# Patient Record
Sex: MALE | Race: ASIAN | NOT HISPANIC OR LATINO
[De-identification: names, ages, dates, MRNs, and addresses within clinical notes are randomized per-mention and may not be internally consistent; named-entity substitution may affect disease eponyms.]

---

## 2018-08-22 PROBLEM — Z00.00 ENCOUNTER FOR PREVENTIVE HEALTH EXAMINATION: Status: ACTIVE | Noted: 2018-08-22

## 2018-09-11 ENCOUNTER — APPOINTMENT (OUTPATIENT)
Dept: SURGERY | Facility: CLINIC | Age: 68
End: 2018-09-11
Payer: COMMERCIAL

## 2018-09-11 VITALS
DIASTOLIC BLOOD PRESSURE: 74 MMHG | WEIGHT: 126 LBS | SYSTOLIC BLOOD PRESSURE: 111 MMHG | HEIGHT: 66 IN | OXYGEN SATURATION: 98 % | HEART RATE: 91 BPM | BODY MASS INDEX: 20.25 KG/M2

## 2018-09-11 PROCEDURE — 99214 OFFICE O/P EST MOD 30 MIN: CPT

## 2018-09-13 ENCOUNTER — INPATIENT (INPATIENT)
Facility: HOSPITAL | Age: 68
LOS: 4 days | Discharge: HOME | DRG: 391 | End: 2018-09-18
Attending: SURGERY | Admitting: SURGERY
Payer: COMMERCIAL

## 2018-09-13 VITALS
HEIGHT: 67 IN | RESPIRATION RATE: 18 BRPM | WEIGHT: 119.93 LBS | TEMPERATURE: 98 F | HEART RATE: 114 BPM | OXYGEN SATURATION: 98 % | SYSTOLIC BLOOD PRESSURE: 101 MMHG | DIASTOLIC BLOOD PRESSURE: 70 MMHG

## 2018-09-13 DIAGNOSIS — K92.0 HEMATEMESIS: ICD-10-CM

## 2018-09-13 LAB
ALBUMIN SERPL ELPH-MCNC: 3.1 G/DL — LOW (ref 3.3–5)
ALP SERPL-CCNC: 107 U/L — SIGNIFICANT CHANGE UP (ref 40–120)
ALT FLD-CCNC: 51 U/L — HIGH (ref 10–45)
ANION GAP SERPL CALC-SCNC: 11 MMOL/L — SIGNIFICANT CHANGE UP (ref 5–17)
APTT BLD: 50.3 SEC — HIGH (ref 27.5–37.4)
AST SERPL-CCNC: 33 U/L — SIGNIFICANT CHANGE UP (ref 10–40)
BASOPHILS # BLD AUTO: 0 K/UL — SIGNIFICANT CHANGE UP (ref 0–0.2)
BASOPHILS NFR BLD AUTO: 0 % — SIGNIFICANT CHANGE UP (ref 0–2)
BASOPHILS NFR BLD AUTO: 0.1 % — SIGNIFICANT CHANGE UP (ref 0–2)
BASOPHILS NFR BLD AUTO: 0.2 % — SIGNIFICANT CHANGE UP (ref 0–2)
BILIRUB SERPL-MCNC: 0.8 MG/DL — SIGNIFICANT CHANGE UP (ref 0.2–1.2)
BUN SERPL-MCNC: 19 MG/DL — SIGNIFICANT CHANGE UP (ref 7–23)
CALCIUM SERPL-MCNC: 8.6 MG/DL — SIGNIFICANT CHANGE UP (ref 8.4–10.5)
CHLORIDE SERPL-SCNC: 98 MMOL/L — SIGNIFICANT CHANGE UP (ref 96–108)
CO2 SERPL-SCNC: 25 MMOL/L — SIGNIFICANT CHANGE UP (ref 22–31)
CREAT SERPL-MCNC: 0.7 MG/DL — SIGNIFICANT CHANGE UP (ref 0.5–1.3)
EOSINOPHIL # BLD AUTO: 0 K/UL — SIGNIFICANT CHANGE UP (ref 0–0.5)
EOSINOPHIL # BLD AUTO: 0.1 K/UL — SIGNIFICANT CHANGE UP (ref 0–0.5)
EOSINOPHIL # BLD AUTO: 0.1 K/UL — SIGNIFICANT CHANGE UP (ref 0–0.5)
EOSINOPHIL NFR BLD AUTO: 0.2 % — SIGNIFICANT CHANGE UP (ref 0–6)
EOSINOPHIL NFR BLD AUTO: 0.4 % — SIGNIFICANT CHANGE UP (ref 0–6)
EOSINOPHIL NFR BLD AUTO: 0.5 % — SIGNIFICANT CHANGE UP (ref 0–6)
GAS PNL BLDV: SIGNIFICANT CHANGE UP
GLUCOSE SERPL-MCNC: 142 MG/DL — HIGH (ref 70–99)
HCT VFR BLD CALC: 29.6 % — LOW (ref 39–50)
HCT VFR BLD CALC: 30.7 % — LOW (ref 39–50)
HCT VFR BLD CALC: 38.2 % — LOW (ref 39–50)
HGB BLD-MCNC: 11.9 G/DL — LOW (ref 13–17)
HGB BLD-MCNC: 9.2 G/DL — LOW (ref 13–17)
HGB BLD-MCNC: 9.8 G/DL — LOW (ref 13–17)
INR BLD: 1.54 RATIO — HIGH (ref 0.88–1.16)
INR BLD: 6.01 RATIO — CRITICAL HIGH (ref 0.88–1.16)
INR BLD: 6.79 RATIO — CRITICAL HIGH (ref 0.88–1.16)
LIDOCAIN IGE QN: >530 U/L — HIGH (ref 7–60)
LYMPHOCYTES # BLD AUTO: 1.8 K/UL — SIGNIFICANT CHANGE UP (ref 1–3.3)
LYMPHOCYTES # BLD AUTO: 12.9 % — LOW (ref 13–44)
LYMPHOCYTES # BLD AUTO: 15.3 % — SIGNIFICANT CHANGE UP (ref 13–44)
LYMPHOCYTES # BLD AUTO: 2.2 K/UL — SIGNIFICANT CHANGE UP (ref 1–3.3)
LYMPHOCYTES # BLD AUTO: 2.5 K/UL — SIGNIFICANT CHANGE UP (ref 1–3.3)
LYMPHOCYTES # BLD AUTO: 9.3 % — LOW (ref 13–44)
MCHC RBC-ENTMCNC: 27.9 PG — SIGNIFICANT CHANGE UP (ref 27–34)
MCHC RBC-ENTMCNC: 28.1 PG — SIGNIFICANT CHANGE UP (ref 27–34)
MCHC RBC-ENTMCNC: 28.7 PG — SIGNIFICANT CHANGE UP (ref 27–34)
MCHC RBC-ENTMCNC: 30.9 GM/DL — LOW (ref 32–36)
MCHC RBC-ENTMCNC: 31.2 GM/DL — LOW (ref 32–36)
MCHC RBC-ENTMCNC: 32 GM/DL — SIGNIFICANT CHANGE UP (ref 32–36)
MCV RBC AUTO: 89.7 FL — SIGNIFICANT CHANGE UP (ref 80–100)
MCV RBC AUTO: 90 FL — SIGNIFICANT CHANGE UP (ref 80–100)
MCV RBC AUTO: 90.1 FL — SIGNIFICANT CHANGE UP (ref 80–100)
MONOCYTES # BLD AUTO: 1.3 K/UL — HIGH (ref 0–0.9)
MONOCYTES # BLD AUTO: 1.5 K/UL — HIGH (ref 0–0.9)
MONOCYTES # BLD AUTO: 1.7 K/UL — HIGH (ref 0–0.9)
MONOCYTES NFR BLD AUTO: 7.4 % — SIGNIFICANT CHANGE UP (ref 2–14)
MONOCYTES NFR BLD AUTO: 8.9 % — SIGNIFICANT CHANGE UP (ref 2–14)
MONOCYTES NFR BLD AUTO: 8.9 % — SIGNIFICANT CHANGE UP (ref 2–14)
NEUTROPHILS # BLD AUTO: 12.4 K/UL — HIGH (ref 1.8–7.4)
NEUTROPHILS # BLD AUTO: 13.6 K/UL — HIGH (ref 1.8–7.4)
NEUTROPHILS # BLD AUTO: 15.3 K/UL — HIGH (ref 1.8–7.4)
NEUTROPHILS NFR BLD AUTO: 75 % — SIGNIFICANT CHANGE UP (ref 43–77)
NEUTROPHILS NFR BLD AUTO: 79.4 % — HIGH (ref 43–77)
NEUTROPHILS NFR BLD AUTO: 81.3 % — HIGH (ref 43–77)
OB PNL STL: NEGATIVE — SIGNIFICANT CHANGE UP
PLATELET # BLD AUTO: 476 K/UL — HIGH (ref 150–400)
PLATELET # BLD AUTO: 499 K/UL — HIGH (ref 150–400)
PLATELET # BLD AUTO: 552 K/UL — HIGH (ref 150–400)
POTASSIUM SERPL-MCNC: 4.2 MMOL/L — SIGNIFICANT CHANGE UP (ref 3.5–5.3)
POTASSIUM SERPL-SCNC: 4.2 MMOL/L — SIGNIFICANT CHANGE UP (ref 3.5–5.3)
PROT SERPL-MCNC: 6.5 G/DL — SIGNIFICANT CHANGE UP (ref 6–8.3)
PROTHROM AB SERPL-ACNC: 16.8 SEC — HIGH (ref 9.8–12.7)
PROTHROM AB SERPL-ACNC: 67.3 SEC — HIGH (ref 9.8–12.7)
PROTHROM AB SERPL-ACNC: 76.3 SEC — HIGH (ref 9.8–12.7)
RBC # BLD: 3.29 M/UL — LOW (ref 4.2–5.8)
RBC # BLD: 3.42 M/UL — LOW (ref 4.2–5.8)
RBC # BLD: 4.25 M/UL — SIGNIFICANT CHANGE UP (ref 4.2–5.8)
RBC # FLD: 15.8 % — HIGH (ref 10.3–14.5)
RBC # FLD: 15.9 % — HIGH (ref 10.3–14.5)
RBC # FLD: 16.2 % — HIGH (ref 10.3–14.5)
SODIUM SERPL-SCNC: 134 MMOL/L — LOW (ref 135–145)
WBC # BLD: 16.6 K/UL — HIGH (ref 3.8–10.5)
WBC # BLD: 17.2 K/UL — HIGH (ref 3.8–10.5)
WBC # BLD: 18.8 K/UL — HIGH (ref 3.8–10.5)
WBC # FLD AUTO: 16.6 K/UL — HIGH (ref 3.8–10.5)
WBC # FLD AUTO: 17.2 K/UL — HIGH (ref 3.8–10.5)
WBC # FLD AUTO: 18.8 K/UL — HIGH (ref 3.8–10.5)

## 2018-09-13 PROCEDURE — 99285 EMERGENCY DEPT VISIT HI MDM: CPT

## 2018-09-13 RX ORDER — PHYTONADIONE (VIT K1) 5 MG
10 TABLET ORAL ONCE
Qty: 0 | Refills: 0 | Status: COMPLETED | OUTPATIENT
Start: 2018-09-13 | End: 2018-09-13

## 2018-09-13 RX ORDER — ONDANSETRON 8 MG/1
4 TABLET, FILM COATED ORAL ONCE
Qty: 0 | Refills: 0 | Status: COMPLETED | OUTPATIENT
Start: 2018-09-13 | End: 2018-09-13

## 2018-09-13 RX ORDER — SODIUM CHLORIDE 9 MG/ML
2000 INJECTION INTRAMUSCULAR; INTRAVENOUS; SUBCUTANEOUS ONCE
Qty: 0 | Refills: 0 | Status: COMPLETED | OUTPATIENT
Start: 2018-09-13 | End: 2018-09-13

## 2018-09-13 RX ORDER — PANTOPRAZOLE SODIUM 20 MG/1
80 TABLET, DELAYED RELEASE ORAL ONCE
Qty: 0 | Refills: 0 | Status: COMPLETED | OUTPATIENT
Start: 2018-09-13 | End: 2018-09-13

## 2018-09-13 RX ORDER — PROTHROMBIN COMPLEX CONCENTRATE (HUMAN) 25.5; 16.5; 24; 22; 22; 26 [IU]/ML; [IU]/ML; [IU]/ML; [IU]/ML; [IU]/ML; [IU]/ML
1500 POWDER, FOR SOLUTION INTRAVENOUS ONCE
Qty: 0 | Refills: 0 | Status: COMPLETED | OUTPATIENT
Start: 2018-09-13 | End: 2018-09-13

## 2018-09-13 RX ORDER — ACETAMINOPHEN 500 MG
1000 TABLET ORAL ONCE
Qty: 0 | Refills: 0 | Status: COMPLETED | OUTPATIENT
Start: 2018-09-13 | End: 2018-09-13

## 2018-09-13 RX ADMIN — Medication 1000 MILLIGRAM(S): at 16:33

## 2018-09-13 RX ADMIN — PANTOPRAZOLE SODIUM 80 MILLIGRAM(S): 20 TABLET, DELAYED RELEASE ORAL at 16:49

## 2018-09-13 RX ADMIN — SODIUM CHLORIDE 1000 MILLILITER(S): 9 INJECTION INTRAMUSCULAR; INTRAVENOUS; SUBCUTANEOUS at 14:40

## 2018-09-13 RX ADMIN — Medication 400 MILLIGRAM(S): at 14:39

## 2018-09-13 RX ADMIN — ONDANSETRON 4 MILLIGRAM(S): 8 TABLET, FILM COATED ORAL at 18:36

## 2018-09-13 RX ADMIN — Medication 102 MILLIGRAM(S): at 17:13

## 2018-09-13 RX ADMIN — PROTHROMBIN COMPLEX CONCENTRATE (HUMAN) 400 INTERNATIONAL UNIT(S): 25.5; 16.5; 24; 22; 22; 26 POWDER, FOR SOLUTION INTRAVENOUS at 16:57

## 2018-09-13 RX ADMIN — Medication 10 MILLIGRAM(S): at 20:59

## 2018-09-13 NOTE — ED PROVIDER NOTE - PROGRESS NOTE DETAILS
Graham POWER: Patient signed out by Dr. Collier pending CT A/P and admission. Patient with hemorrhagic pancreatitis, here for coffee ground emesis. Also on coumadin, INR 6, receiving Kcentra, vitamin K and Protonix. Hgb 11.9. No history of melena. Surgery to see patient. Patient to be admitted. Pending CT A/P. Called by surgery, requesting repeat CBC to determine floor vs. SICU. - Madeline Corley PA-C Called by surgery resident, requesting repeat CBC to determine floor vs. SICU. No longer need CT scan. - Madeline Corley PA-C Graham POWER: Discussed case with surgery, repeat Hgb 9.2, /60. No more episodes of vomiting while in ED. Surgery recommendation is to admit to floor. Admit to Dr. Hinojosa. Note: CT cancelled per surgery.

## 2018-09-13 NOTE — ED ADULT NURSE REASSESSMENT NOTE - NS ED NURSE REASSESS COMMENT FT1
Care assumed of pt at this time. Pt updated to plan of care. See VS. Pt denies needs for assistance. Son at bedside.

## 2018-09-13 NOTE — ED ADULT NURSE REASSESSMENT NOTE - NS ED NURSE REASSESS COMMENT FT1
Plan of care explained to son at bedside. comfort and safety measures in place. pt. pending CT scan.

## 2018-09-13 NOTE — ED PROVIDER NOTE - ATTENDING CONTRIBUTION TO CARE
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  Reporting ongoing epigastric pains.  Normal stools.  No prior history of GIB per son.    On exam patient chronically ill appearing, mildly hypotensive but otherwise vital signs within normal limits, oropharynx clear, regular rate and rhythm S1/S2, lungs clear to ascultation bilaterally, abdomen soft but very tender in epigastrum.    Patient on coumadin presenting with upper GIB, also concerned for possibility of complication of pancreatitis (necrosis/erosion/fistula into stomach) - labs with high white count, elevate INR - given Kcentra, Vit K, protonix, will obtain CT A/P to evaluate for complication of pancreatitis, surgical consultation, serial H&H, admission for further care.

## 2018-09-13 NOTE — ED ADULT NURSE NOTE - OBJECTIVE STATEMENT
66 yo M presents to ED A+OX3 by EMS accompanied by his son from nursing home. As per EMS, patient had episode of bloody emesis today. Son at bedside, states patient has had multiple hospital visits after having necrotizing pancreatitis approx. 2 months ago and has been on coumadin after having splenic vein thrombosis. As per son, patient "always had abdominal pain but it is intermittently worse." Breathing unlabored on RA. Skin warm pink and dry. Pt. noted to have PICC line to right upper arm, dressing is clean dry and intact, line was not accessed while in ED. Abdomen soft, nondistended, nontender. Son at bedside. Comfort and safety measures in place.

## 2018-09-13 NOTE — ED PROVIDER NOTE - OBJECTIVE STATEMENT
66yo M with PMH splenic vein thrombosis (on coumadin), necrotizing pancreatitis (2 months ago) with recurrent hospitalizations ([facility]) sent from rehab facility for hematemeses x 1 today.  Patient is Cantonese speaking with son translating at bedside per patient request.  Patient reports decreased PO intake and return of diffuse abdominal pain x 3-4 days. Saw GI on 9/11 and told pain likely due to pancreatic enzyme trouble. 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 Denies any alcohol/drug use. Denies fever/chills, CP, SOB, palpitations, melena, BRBPR, hematuria.   GI Malik Hinojosa 66yo M with PMH splenic vein thrombosis (on coumadin), necrotizing pancreatitis (2 months ago) with recurrent hospitalizations ([facility]) sent from rehab facility for hematemeses x 1 today.  Patient is Cantonese speaking with son translating at bedside per patient request.  Patient reports decreased PO intake and return of diffuse abdominal pain x 3-4 days. Saw GI on 9/11 and told pain likely due to pancreatic enzyme trouble. 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 Denies any alcohol/drug use. Denies fever/chills, CP, SOB, palpitations, melena, BRBPR, hematuria, back pain.  GI Malik Hinojosa

## 2018-09-13 NOTE — ED ADULT NURSE NOTE - INTERVENTIONS DEFINITIONS
Call bell, personal items and telephone within reach/Stretcher in lowest position, wheels locked, appropriate side rails in place/[place] to call system/Instruct patient to call for assistance/Provide visual cue, wrist band, yellow gown, etc.

## 2018-09-14 LAB
ALBUMIN SERPL ELPH-MCNC: 2.3 G/DL — LOW (ref 3.3–5)
ALBUMIN SERPL ELPH-MCNC: 2.4 G/DL — LOW (ref 3.3–5)
ALP SERPL-CCNC: 80 U/L — SIGNIFICANT CHANGE UP (ref 40–120)
ALP SERPL-CCNC: 88 U/L — SIGNIFICANT CHANGE UP (ref 40–120)
ALT FLD-CCNC: 26 U/L — SIGNIFICANT CHANGE UP (ref 10–45)
ALT FLD-CCNC: 28 U/L — SIGNIFICANT CHANGE UP (ref 10–45)
ANION GAP SERPL CALC-SCNC: 12 MMOL/L — SIGNIFICANT CHANGE UP (ref 5–17)
ANION GAP SERPL CALC-SCNC: 7 MMOL/L — SIGNIFICANT CHANGE UP (ref 5–17)
ANION GAP SERPL CALC-SCNC: 9 MMOL/L — SIGNIFICANT CHANGE UP (ref 5–17)
APPEARANCE UR: CLEAR — SIGNIFICANT CHANGE UP
APTT BLD: 32.6 SEC — SIGNIFICANT CHANGE UP (ref 27.5–37.4)
AST SERPL-CCNC: 12 U/L — SIGNIFICANT CHANGE UP (ref 10–40)
AST SERPL-CCNC: 15 U/L — SIGNIFICANT CHANGE UP (ref 10–40)
BACTERIA # UR AUTO: NEGATIVE — SIGNIFICANT CHANGE UP
BILIRUB SERPL-MCNC: 0.4 MG/DL — SIGNIFICANT CHANGE UP (ref 0.2–1.2)
BILIRUB SERPL-MCNC: 0.7 MG/DL — SIGNIFICANT CHANGE UP (ref 0.2–1.2)
BILIRUB UR-MCNC: NEGATIVE — SIGNIFICANT CHANGE UP
BUN SERPL-MCNC: 11 MG/DL — SIGNIFICANT CHANGE UP (ref 7–23)
BUN SERPL-MCNC: 13 MG/DL — SIGNIFICANT CHANGE UP (ref 7–23)
BUN SERPL-MCNC: 15 MG/DL — SIGNIFICANT CHANGE UP (ref 7–23)
CALCIUM SERPL-MCNC: 8 MG/DL — LOW (ref 8.4–10.5)
CALCIUM SERPL-MCNC: 8.1 MG/DL — LOW (ref 8.4–10.5)
CALCIUM SERPL-MCNC: 8.2 MG/DL — LOW (ref 8.4–10.5)
CHLORIDE SERPL-SCNC: 102 MMOL/L — SIGNIFICANT CHANGE UP (ref 96–108)
CHLORIDE SERPL-SCNC: 102 MMOL/L — SIGNIFICANT CHANGE UP (ref 96–108)
CHLORIDE SERPL-SCNC: 103 MMOL/L — SIGNIFICANT CHANGE UP (ref 96–108)
CO2 SERPL-SCNC: 23 MMOL/L — SIGNIFICANT CHANGE UP (ref 22–31)
CO2 SERPL-SCNC: 23 MMOL/L — SIGNIFICANT CHANGE UP (ref 22–31)
CO2 SERPL-SCNC: 24 MMOL/L — SIGNIFICANT CHANGE UP (ref 22–31)
COLOR SPEC: ABNORMAL
CREAT SERPL-MCNC: 0.67 MG/DL — SIGNIFICANT CHANGE UP (ref 0.5–1.3)
CREAT SERPL-MCNC: 0.69 MG/DL — SIGNIFICANT CHANGE UP (ref 0.5–1.3)
CREAT SERPL-MCNC: 0.71 MG/DL — SIGNIFICANT CHANGE UP (ref 0.5–1.3)
DIFF PNL FLD: NEGATIVE — SIGNIFICANT CHANGE UP
EPI CELLS # UR: 3 /HPF — SIGNIFICANT CHANGE UP
GLUCOSE SERPL-MCNC: 110 MG/DL — HIGH (ref 70–99)
GLUCOSE SERPL-MCNC: 129 MG/DL — HIGH (ref 70–99)
GLUCOSE SERPL-MCNC: 135 MG/DL — HIGH (ref 70–99)
GLUCOSE UR QL: NEGATIVE — SIGNIFICANT CHANGE UP
HCT VFR BLD CALC: 25.6 % — LOW (ref 39–50)
HCT VFR BLD CALC: 26 % — LOW (ref 39–50)
HCT VFR BLD CALC: 26.4 % — LOW (ref 39–50)
HGB BLD-MCNC: 8.4 G/DL — LOW (ref 13–17)
HGB BLD-MCNC: 8.4 G/DL — LOW (ref 13–17)
HGB BLD-MCNC: 8.7 G/DL — LOW (ref 13–17)
HYALINE CASTS # UR AUTO: 1 /LPF — SIGNIFICANT CHANGE UP (ref 0–2)
INR BLD: 1.37 RATIO — HIGH (ref 0.88–1.16)
INR BLD: 1.55 RATIO — HIGH (ref 0.88–1.16)
KETONES UR-MCNC: NEGATIVE — SIGNIFICANT CHANGE UP
LACTATE SERPL-SCNC: 0.9 MMOL/L — SIGNIFICANT CHANGE UP (ref 0.7–2)
LACTATE SERPL-SCNC: 1 MMOL/L — SIGNIFICANT CHANGE UP (ref 0.7–2)
LEUKOCYTE ESTERASE UR-ACNC: NEGATIVE — SIGNIFICANT CHANGE UP
LIDOCAIN IGE QN: >530 U/L — HIGH (ref 7–60)
MAGNESIUM SERPL-MCNC: 1.9 MG/DL — SIGNIFICANT CHANGE UP (ref 1.6–2.6)
MAGNESIUM SERPL-MCNC: 2 MG/DL — SIGNIFICANT CHANGE UP (ref 1.6–2.6)
MAGNESIUM SERPL-MCNC: 2.1 MG/DL — SIGNIFICANT CHANGE UP (ref 1.6–2.6)
MCHC RBC-ENTMCNC: 29.1 PG — SIGNIFICANT CHANGE UP (ref 27–34)
MCHC RBC-ENTMCNC: 29.5 PG — SIGNIFICANT CHANGE UP (ref 27–34)
MCHC RBC-ENTMCNC: 29.6 PG — SIGNIFICANT CHANGE UP (ref 27–34)
MCHC RBC-ENTMCNC: 32.5 GM/DL — SIGNIFICANT CHANGE UP (ref 32–36)
MCHC RBC-ENTMCNC: 32.8 GM/DL — SIGNIFICANT CHANGE UP (ref 32–36)
MCHC RBC-ENTMCNC: 32.8 GM/DL — SIGNIFICANT CHANGE UP (ref 32–36)
MCV RBC AUTO: 89.5 FL — SIGNIFICANT CHANGE UP (ref 80–100)
MCV RBC AUTO: 90 FL — SIGNIFICANT CHANGE UP (ref 80–100)
MCV RBC AUTO: 90.4 FL — SIGNIFICANT CHANGE UP (ref 80–100)
NITRITE UR-MCNC: NEGATIVE — SIGNIFICANT CHANGE UP
PH UR: 7 — SIGNIFICANT CHANGE UP (ref 5–8)
PHOSPHATE SERPL-MCNC: 1.9 MG/DL — LOW (ref 2.5–4.5)
PHOSPHATE SERPL-MCNC: 2.2 MG/DL — LOW (ref 2.5–4.5)
PHOSPHATE SERPL-MCNC: 3.3 MG/DL — SIGNIFICANT CHANGE UP (ref 2.5–4.5)
PLATELET # BLD AUTO: 415 K/UL — HIGH (ref 150–400)
PLATELET # BLD AUTO: 421 K/UL — HIGH (ref 150–400)
PLATELET # BLD AUTO: 433 K/UL — HIGH (ref 150–400)
POTASSIUM SERPL-MCNC: 4 MMOL/L — SIGNIFICANT CHANGE UP (ref 3.5–5.3)
POTASSIUM SERPL-MCNC: 4.1 MMOL/L — SIGNIFICANT CHANGE UP (ref 3.5–5.3)
POTASSIUM SERPL-MCNC: 4.2 MMOL/L — SIGNIFICANT CHANGE UP (ref 3.5–5.3)
POTASSIUM SERPL-SCNC: 4 MMOL/L — SIGNIFICANT CHANGE UP (ref 3.5–5.3)
POTASSIUM SERPL-SCNC: 4.1 MMOL/L — SIGNIFICANT CHANGE UP (ref 3.5–5.3)
POTASSIUM SERPL-SCNC: 4.2 MMOL/L — SIGNIFICANT CHANGE UP (ref 3.5–5.3)
PROT SERPL-MCNC: 5.3 G/DL — LOW (ref 6–8.3)
PROT SERPL-MCNC: 5.3 G/DL — LOW (ref 6–8.3)
PROT UR-MCNC: ABNORMAL
PROTHROM AB SERPL-ACNC: 15 SEC — HIGH (ref 9.8–12.7)
PROTHROM AB SERPL-ACNC: 16.9 SEC — HIGH (ref 9.8–12.7)
RBC # BLD: 2.84 M/UL — LOW (ref 4.2–5.8)
RBC # BLD: 2.91 M/UL — LOW (ref 4.2–5.8)
RBC # BLD: 2.93 M/UL — LOW (ref 4.2–5.8)
RBC # FLD: 15.7 % — HIGH (ref 10.3–14.5)
RBC # FLD: 15.7 % — HIGH (ref 10.3–14.5)
RBC # FLD: 15.8 % — HIGH (ref 10.3–14.5)
RBC CASTS # UR COMP ASSIST: 4 /HPF — SIGNIFICANT CHANGE UP (ref 0–4)
SODIUM SERPL-SCNC: 133 MMOL/L — LOW (ref 135–145)
SODIUM SERPL-SCNC: 135 MMOL/L — SIGNIFICANT CHANGE UP (ref 135–145)
SODIUM SERPL-SCNC: 137 MMOL/L — SIGNIFICANT CHANGE UP (ref 135–145)
SP GR SPEC: 1.02 — SIGNIFICANT CHANGE UP (ref 1.01–1.02)
TRIGL SERPL-MCNC: 138 MG/DL — SIGNIFICANT CHANGE UP (ref 10–149)
UROBILINOGEN FLD QL: NEGATIVE — SIGNIFICANT CHANGE UP
WBC # BLD: 12 K/UL — HIGH (ref 3.8–10.5)
WBC # BLD: 13 K/UL — HIGH (ref 3.8–10.5)
WBC # BLD: 13.1 K/UL — HIGH (ref 3.8–10.5)
WBC # FLD AUTO: 12 K/UL — HIGH (ref 3.8–10.5)
WBC # FLD AUTO: 13 K/UL — HIGH (ref 3.8–10.5)
WBC # FLD AUTO: 13.1 K/UL — HIGH (ref 3.8–10.5)
WBC UR QL: 3 /HPF — SIGNIFICANT CHANGE UP (ref 0–5)

## 2018-09-14 PROCEDURE — 99223 1ST HOSP IP/OBS HIGH 75: CPT | Mod: GC

## 2018-09-14 RX ORDER — ENOXAPARIN SODIUM 100 MG/ML
40 INJECTION SUBCUTANEOUS DAILY
Qty: 0 | Refills: 0 | Status: DISCONTINUED | OUTPATIENT
Start: 2018-09-14 | End: 2018-09-15

## 2018-09-14 RX ORDER — PANTOPRAZOLE SODIUM 20 MG/1
40 TABLET, DELAYED RELEASE ORAL
Qty: 0 | Refills: 0 | Status: DISCONTINUED | OUTPATIENT
Start: 2018-09-14 | End: 2018-09-18

## 2018-09-14 RX ORDER — ACETAMINOPHEN 500 MG
1000 TABLET ORAL ONCE
Qty: 0 | Refills: 0 | Status: COMPLETED | OUTPATIENT
Start: 2018-09-14 | End: 2018-09-14

## 2018-09-14 RX ORDER — DOCUSATE SODIUM 100 MG
100 CAPSULE ORAL
Qty: 0 | Refills: 0 | Status: DISCONTINUED | OUTPATIENT
Start: 2018-09-14 | End: 2018-09-18

## 2018-09-14 RX ORDER — PANTOPRAZOLE SODIUM 20 MG/1
40 TABLET, DELAYED RELEASE ORAL
Qty: 0 | Refills: 0 | Status: DISCONTINUED | OUTPATIENT
Start: 2018-09-14 | End: 2018-09-14

## 2018-09-14 RX ORDER — SODIUM CHLORIDE 9 MG/ML
1000 INJECTION, SOLUTION INTRAVENOUS
Qty: 0 | Refills: 0 | Status: DISCONTINUED | OUTPATIENT
Start: 2018-09-14 | End: 2018-09-14

## 2018-09-14 RX ADMIN — Medication 100 MILLIGRAM(S): at 05:35

## 2018-09-14 RX ADMIN — ENOXAPARIN SODIUM 40 MILLIGRAM(S): 100 INJECTION SUBCUTANEOUS at 12:01

## 2018-09-14 RX ADMIN — Medication 85 MILLIMOLE(S): at 20:21

## 2018-09-14 RX ADMIN — Medication 62.5 MILLIMOLE(S): at 15:51

## 2018-09-14 RX ADMIN — SODIUM CHLORIDE 75 MILLILITER(S): 9 INJECTION, SOLUTION INTRAVENOUS at 01:36

## 2018-09-14 RX ADMIN — PANTOPRAZOLE SODIUM 40 MILLIGRAM(S): 20 TABLET, DELAYED RELEASE ORAL at 05:35

## 2018-09-14 RX ADMIN — Medication 1000 MILLIGRAM(S): at 01:51

## 2018-09-14 RX ADMIN — PANTOPRAZOLE SODIUM 40 MILLIGRAM(S): 20 TABLET, DELAYED RELEASE ORAL at 17:58

## 2018-09-14 RX ADMIN — Medication 100 MILLIGRAM(S): at 17:58

## 2018-09-14 RX ADMIN — Medication 62.5 MILLIMOLE(S): at 10:25

## 2018-09-14 RX ADMIN — Medication 400 MILLIGRAM(S): at 01:36

## 2018-09-14 NOTE — H&P ADULT - NSHPPHYSICALEXAM_GEN_ALL_CORE
Gen: AAOx3, NAD, mentating normally  Neuro: Cranial nerves II-XII grossly intact  HEENT: Atraumatic, normocephalic  CV: RRR, normal S1/S2, no audible m/r/g  Pulm: Breathing comfortably on RA. Equal chest rise b/l. Lung fields CTAB  Abd: Soft, epigastric tenderness, moderately distended. No rebound or guarding.  Back/flank: No CVA tenderness  Extremities: WWP. Moving all 4 extremities spontaneously. Strength 5/5. Sensation intact  Skin: No rashes or suspicious lesions

## 2018-09-14 NOTE — H&P ADULT - NSHPLABSRESULTS_GEN_ALL_CORE
CBC (09-13 @ 20:33)                              9.2<L>                         16.6<H>  )----------------(  476<H>     75.0  % Neutrophils, 15.3  % Lymphocytes, ANC: 12.4<H>                              29.6<L>  CBC (09-13 @ 17:14)                              9.8<L>                         17.2<H>  )----------------(  499<H>     79.4<H>% Neutrophils, 12.9<L>% Lymphocytes, ANC: 13.6<H>                              30.7<L>    BMP (09-13 @ 14:48)             134<L>  |  98      |  19    		Ca++ --      Ca 8.6                ---------------------------------( 142<H>		Mg --                 4.2     |  25      |  0.70  			Ph --        LFTs (09-13 @ 14:48)      TPro 6.5 / Alb 3.1<L> / TBili 0.8 / DBili -- / AST 33 / ALT 51<H> / AlkPhos 107    Coags (09-13 @ 18:01)  aPTT -- / INR 1.54<H> / PT 16.8<H>  Coags (09-13 @ 17:14)  aPTT -- / INR 6.79<HH> / PT 76.3<H>        VBG (09-13 @ 14:48)     7.41 / 42 / 56<H> / 26 / 1.7 / 88%     Lactate: 2.9<H>

## 2018-09-14 NOTE — H&P ADULT - ASSESSMENT
Assessment:  67M pmhx necrotizing pancreatitis, with demonstration of yandy-pancreatic fluid collections, now presenting with 1 episode of coffee-ground emesis and abdominal pain.    Plan:  - Admit [facility] under Dr. Hinojosa  - Regular diet  - Pain control as necessary  - No need for further CT scans  - Will monitor H&H  - OOB and ambulating as tolerated    Discussed with Dr. Ashish Pop, PGY-2  [facility] [phone]

## 2018-09-14 NOTE — H&P ADULT - HISTORY OF PRESENT ILLNESS
67 year-old Cantonese speaking gentleman with pmhx necrotizing pancreatitis, who was sent in from his rehab facility for an episode of coffee-ground emesis. Per his son, the patient reports diffuse abdominal pain over the last 3-4 days prior to admission. The patient first had an episode of pancreatitis about 1.5 years ago, with unclear etiology. He was medically managed, and did well until July of this year, at which time he was admitted to an OSH for another episode of pancreatitis. Per the patient's son, this was found to be necrotizing pancreatitis, and the patient spent at least a few days in the ICU prior to discharge. Since then, the patient has presented to [facility] 2-3 additional times for bouts of abdominal pain similar to those he experienced from pancreatitis. He was noted on multiple scans to have yandy-pancreatic fluid collections, as well as a dilated pancreatic duct and elevated amylase levels, but not recurrent pancreatitis. He was recently referred to Dr. Hinojosa and saw him once in the office as a new patient. 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 He denies any alcohol/drug use. Denies fever/chills, CP, SOB, palpitations, melena, BRBPR, hematuria, back pain.

## 2018-09-14 NOTE — CONSULT NOTE ADULT - ASSESSMENT
Impression:  1)Coffee-ground emesis- concern for upper GI bleed in setting of supratherapeutic INR, differential includes gastric varices (2/2 splenic vein thrombosis), peptic ulcer disease, esophagitis, angioectasia. NO evidence of bowel obstruction or ileus at this time.   2)Supratherapeutic INR- corrected  3)Necrotizing pancreatitis- the patient does not appear to be infected currently; unsure of status of peripancreatic fluid collections but current pain appears to be more 2/2 acute on chronic pancreatitis. Unclear etiology of pancreatitis.     Recommendations:  -given patient eating diet this morning unable to perform EGD urgently to evaluate etiology of coffee-ground emesis. Given lack of continued overt GI bleeding can hold off on EGD for now, but would recommend evaluation at some point. If recurrent coffee-ground emesis would recommend urgent EGD.   -monitor Hgb daily and monitor bowel movements  -c/w PPI IV BID- would change to PO daily on discharge; avoid NSAIDs  -check IgG4 level and fasting triglyceride level  -please obtain outpatient records as to workup for pancreatitis in past   -coumadin education and management     Please call with questions  Karly Solo  GI Fellow  Pager: [phone] Impression:  1)Coffee-ground emesis- concern for upper GI bleed in setting of supratherapeutic INR, differential includes gastric varices (2/2 splenic vein thrombosis), peptic ulcer disease, esophagitis, angioectasia. NO evidence of bowel obstruction or ileus at this time.   2)Supratherapeutic INR- corrected  3)Necrotizing pancreatitis- the patient does not appear to be infected currently; unsure of status of peripancreatic fluid collections but current pain appears to be more 2/2 acute on chronic pancreatitis. Unclear etiology of pancreatitis.     Recommendations:  -given patient eating diet this morning unable to perform EGD urgently to evaluate etiology of coffee-ground emesis. Given lack of continued overt GI bleeding can hold off on EGD for now, but would recommend evaluation at some point. If recurrent coffee-ground emesis would recommend urgent EGD.   -monitor Hgb daily and monitor bowel movements  -c/w PPI IV BID- would change to PO daily on discharge; avoid NSAIDs  -check IgG4 level and fasting triglyceride level  -please obtain outpatient records as to workup for pancreatitis in past   -coumadin education and management     Please call with questions  Karly Solo  GI Fellow  Pager: [phone]

## 2018-09-14 NOTE — CONSULT NOTE ADULT - SUBJECTIVE AND OBJECTIVE BOX
Chief Complaint:  Patient is a 67y old  Male who presents with a chief complaint of "dark emesis"     HPI:  67M with history of necrotic pancreatitis- unclear etiology, first diagnosed ~1.5 years ago and was treated at [facility], recently discharged from hospital and was at [facility] rehab when had episode of coffee-ground emesis x 1 in setting of epigastric abdominal pain over the last 3-4 days. The patient his pain is typical for the type of pain he gets with pancreatitis. He denies fevers or chills and he is able to tolerate diet. He had been on course of Prednisone for suspected autoimmune hepatitis but his symptoms did not improve. 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 He has never had another episode of coffee ground emesis or hematemesis in the past. His stools are green/brown in color- he denies melena or hematochezia. He has known peripancreatic fluid collections but these have never required drainage. He was able to eat breakfast this morning and is s/p 2L NS in ED. The patient was found to have INR >6 on arrival and was given K-centra and Vitamin K.       Allergies:  No Known Allergies      Home Medications:  Coumadin (pt unsure when he started this medication)  [facility] Medications:  docusate sodium 100 milliGRAM(s) Oral two times a day  enoxaparin Injectable 40 milliGRAM(s) SubCutaneous daily  lactated ringers. 1000 milliLiter(s) IV Continuous <Continuous>  pantoprazole  Injectable 40 milliGRAM(s) IV Push two times a day  sodium phosphate IVPB 15 milliMole(s) IV Intermittent once  sodium phosphate IVPB 30 milliMole(s) IV Intermittent once      PMHX/PSHX:  Chronic pancreatitis, unspecified pancreatitis type  No significant past surgical history      Family history:  No pertinent family history in first degree relatives      Social History: denies ETOH (drinks maybe 1 drink per year), no tobacco, no ETOH     ROS:     General:  No wt loss, fevers, chills, night sweats, fatigue,   Eyes:  Good vision, no reported pain  ENT:  No sore throat, pain, runny nose, dysphagia  CV:  No pain, palpitations, hypo/hypertension  Resp:  No dyspnea, cough, tachypnea, wheezing  GI:  + pain, No nausea, No vomiting, No diarrhea, No constipation, No weight loss, No fever, No pruritis, No rectal bleeding, No tarry stools, No dysphagia,  :  No pain, bleeding, incontinence, nocturia  Muscle:  No pain, weakness  Neuro:  No weakness, tingling, memory problems  Psych:  No fatigue, insomnia, mood problems, depression  Endocrine:  No polyuria, polydipsia, cold/heat intolerance  Heme:  No petechiae, ecchymosis, easy bruisability  Skin:  No rash, tattoos, scars, edema      PHYSICAL EXAM:     GENERAL:  Appears stated age, well-groomed, well-nourished, no distress  HEENT:  NC/AT,  conjunctivae clear and pink, no thyromegaly, nodules, adenopathy, no JVD, sclera -anicteric  CHEST:  Full & symmetric excursion, no increased effort, breath sounds clear  HEART:  Regular rhythm, S1, S2, no murmur/rub/S3/S4, no abdominal bruit, no edema  ABDOMEN:  Soft, non-tender, non-distended, normoactive bowel sounds  EXTEREMITIES:  no cyanosis,clubbing or edema  SKIN:  No rash/erythema, intact   NEURO:  Alert, oriented, no asterixis, no tremor, no encephalopathy    Vital Signs:  Vital Signs Last 24 Hrs  T(C): 37 (14 Sep 2018 11:46), Max: 37 (13 Sep 2018 14:36)  T(F): 98.6 (14 Sep 2018 11:46), Max: 98.6 (13 Sep 2018 14:36)  HR: 86 (14 Sep 2018 11:46) (77 - 114)  BP: 92/59 (14 Sep 2018 11:46) (92/59 - 106/67)  BP(mean): --  RR: 17 (14 Sep 2018 11:46) (16 - 20)  SpO2: 97% (14 Sep 2018 05:33) (95% - 100%)  Daily Height in cm: 170.18 (13 Sep 2018 13:42)    Daily     LABS:                        8.7    13.1  )-----------( 421      ( 14 Sep 2018 07:39 )             26.4       Hemoglobin: 8.4 g/dL (18 @ 12:13)  Hemoglobin: 8.7 g/dL (18 @ 07:39)  Hemoglobin: 9.2 g/dL (18 @ 20:33)  Hemoglobin: 9.8 g/dL (18 @ 17:14)  Hemoglobin: 11.9 g/dL (18 @ 14:48)    Mean Cell Volume: 90.4 fl (18 @ 07:39)        135  |  103  |  15  ----------------------------<  110<H>  4.2   |  23  |  0.69    Ca    8.2<L>      14 Sep 2018 07:39  Phos  1.9       Mg     2.0         TPro  6.5  /  Alb  3.1<L>  /  TBili  0.8  /  DBili  x   /  AST  33  /  ALT  51<H>  /  AlkPhos  107      LIVER FUNCTIONS - ( 13 Sep 2018 14:48 )  Alb: 3.1 g/dL / Pro: 6.5 g/dL / ALK PHOS: 107 U/L / ALT: 51 U/L / AST: 33 U/L / GGT: x           PT/INR - ( 14 Sep 2018 01:08 )   PT: 16.9 sec;   INR: 1.55 ratio         PTT - ( 13 Sep 2018 14:48 )  PTT:50.3 sec  Urinalysis Basic - ( 14 Sep 2018 00:14 )    Color: Light Orange / Appearance: Clear / S.023 / pH: x  Gluc: x / Ketone: Negative  / Bili: Negative / Urobili: Negative   Blood: x / Protein: Trace / Nitrite: Negative   Leuk Esterase: Negative / RBC: 4 /hpf / WBC 3 /hpf   Sq Epi: x / Non Sq Epi: 3 /hpf / Bacteria: Negative      Amylase Serum--      Lipase serum>530       Ammonia--  Amylase Serum--      Lipase serum>530       Ammonia--                          8.7    13.1  )-----------( 421      ( 14 Sep 2018 07:39 )             26.4                         9.2    16.6  )-----------( 476      ( 13 Sep 2018 20:33 )             29.6                         9.8    17.2  )-----------( 499      ( 13 Sep 2018 17:14 )             30.7                         11.9   18.8  )-----------( 552      ( 13 Sep 2018 14:48 )             38.2     Imaging:  no new imaging Jose  # [id number]  Chief Complaint:  Patient is a 67y old  Male who presents with a chief complaint of "dark emesis"     HPI:  67M with history of necrotic pancreatitis- unclear etiology, first diagnosed ~1.5 years ago and was treated at [facility], recently discharged from hospital and was at [facility] rehab when had episode of coffee-ground emesis x 1 in setting of epigastric abdominal pain over the last 3-4 days. The patient his pain is typical for the type of pain he gets with pancreatitis. He denies fevers or chills and he is able to tolerate diet. He had been on course of Prednisone for suspected autoimmune hepatitis but his symptoms did not improve. 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 He has never had another episode of coffee ground emesis or hematemesis in the past. His stools are green/brown in color- he denies melena or hematochezia. He has known peripancreatic fluid collections but these have never required drainage. He was able to eat breakfast this morning and is s/p 2L NS in ED. The patient was found to have INR >6 on arrival and was given K-centra and Vitamin K.       Allergies:  No Known Allergies      Home Medications:  Coumadin (pt unsure when he started this medication)  [facility] Medications:  docusate sodium 100 milliGRAM(s) Oral two times a day  enoxaparin Injectable 40 milliGRAM(s) SubCutaneous daily  lactated ringers. 1000 milliLiter(s) IV Continuous <Continuous>  pantoprazole  Injectable 40 milliGRAM(s) IV Push two times a day  sodium phosphate IVPB 15 milliMole(s) IV Intermittent once  sodium phosphate IVPB 30 milliMole(s) IV Intermittent once      PMHX/PSHX:  Chronic pancreatitis, unspecified pancreatitis type  No significant past surgical history      Family history:  No pertinent family history in first degree relatives      Social History: denies ETOH (drinks maybe 1 drink per year), no tobacco, no ETOH     ROS:     General:  No wt loss, fevers, chills, night sweats, fatigue,   Eyes:  Good vision, no reported pain  ENT:  No sore throat, pain, runny nose, dysphagia  CV:  No pain, palpitations, hypo/hypertension  Resp:  No dyspnea, cough, tachypnea, wheezing  GI:  + pain, No nausea, No vomiting, No diarrhea, No constipation, No weight loss, No fever, No pruritis, No rectal bleeding, No tarry stools, No dysphagia,  :  No pain, bleeding, incontinence, nocturia  Muscle:  No pain, weakness  Neuro:  No weakness, tingling, memory problems  Psych:  No fatigue, insomnia, mood problems, depression  Endocrine:  No polyuria, polydipsia, cold/heat intolerance  Heme:  No petechiae, ecchymosis, easy bruisability  Skin:  No rash, tattoos, scars, edema      PHYSICAL EXAM:     GENERAL:  Appears stated age, well-groomed, well-nourished, no distress  HEENT:  NC/AT,  conjunctivae clear and pink, no thyromegaly, nodules, adenopathy, no JVD, sclera -anicteric  CHEST:  Full & symmetric excursion, no increased effort, breath sounds clear  HEART:  Regular rhythm, S1, S2, no murmur/rub/S3/S4, no abdominal bruit, no edema  ABDOMEN:  Soft, epigastric tenderness to deep palpation, non-distended, normoactive bowel sounds, no masses   EXTEREMITIES:  no cyanosis,clubbing or edema  SKIN:  No rash/erythema, intact   NEURO:  Alert, oriented, no asterixis, no tremor, no encephalopathy    Vital Signs:  Vital Signs Last 24 Hrs  T(C): 37 (14 Sep 2018 11:46), Max: 37 (13 Sep 2018 14:36)  T(F): 98.6 (14 Sep 2018 11:46), Max: 98.6 (13 Sep 2018 14:36)  HR: 86 (14 Sep 2018 11:46) (77 - 114)  BP: 92/59 (14 Sep 2018 11:46) (92/59 - 106/67)  BP(mean): --  RR: 17 (14 Sep 2018 11:46) (16 - 20)  SpO2: 97% (14 Sep 2018 05:33) (95% - 100%)  Daily Height in cm: 170.18 (13 Sep 2018 13:42)    Daily     LABS:                        8.7    13.1  )-----------( 421      ( 14 Sep 2018 07:39 )             26.4       Hemoglobin: 8.4 g/dL (18 @ 12:13)  Hemoglobin: 8.7 g/dL (18 @ 07:39)  Hemoglobin: 9.2 g/dL (18 @ 20:33)  Hemoglobin: 9.8 g/dL (18 @ 17:14)  Hemoglobin: 11.9 g/dL (18 @ 14:48)    Mean Cell Volume: 90.4 fl (18 @ 07:39)        135  |  103  |  15  ----------------------------<  110<H>  4.2   |  23  |  0.69    Ca    8.2<L>      14 Sep 2018 07:39  Phos  1.9       Mg     2.0         TPro  6.5  /  Alb  3.1<L>  /  TBili  0.8  /  DBili  x   /  AST  33  /  ALT  51<H>  /  AlkPhos  107      LIVER FUNCTIONS - ( 13 Sep 2018 14:48 )  Alb: 3.1 g/dL / Pro: 6.5 g/dL / ALK PHOS: 107 U/L / ALT: 51 U/L / AST: 33 U/L / GGT: x           PT/INR - ( 14 Sep 2018 01:08 )   PT: 16.9 sec;   INR: 1.55 ratio         PTT - ( 13 Sep 2018 14:48 )  PTT:50.3 sec  Urinalysis Basic - ( 14 Sep 2018 00:14 )    Color: Light Orange / Appearance: Clear / S.023 / pH: x  Gluc: x / Ketone: Negative  / Bili: Negative / Urobili: Negative   Blood: x / Protein: Trace / Nitrite: Negative   Leuk Esterase: Negative / RBC: 4 /hpf / WBC 3 /hpf   Sq Epi: x / Non Sq Epi: 3 /hpf / Bacteria: Negative      Amylase Serum--      Lipase serum>530       Ammonia--  Amylase Serum--      Lipase serum>530       Ammonia--                          8.7    13.1  )-----------( 421      ( 14 Sep 2018 07:39 )             26.4                         9.2    16.6  )-----------( 476      ( 13 Sep 2018 20:33 )             29.6                         9.8    17.2  )-----------( 499      ( 13 Sep 2018 17:14 )             30.7                         11.9   18.8  )-----------( 552      ( 13 Sep 2018 14:48 )             38.2     Imaging:  no new imaging Jose  # [id number]  Chief Complaint:  Patient is a 67y old  Male who presents with a chief complaint of "dark emesis"     HPI:  67M with history of necrotic pancreatitis- unclear etiology, first diagnosed ~1.5 years ago and was treated at [facility], recently discharged from hospital and was at [facility] rehab when had episode of coffee-ground emesis x 1 in setting of epigastric abdominal pain over the last 3-4 days. The patient his pain is typical for the type of pain he gets with pancreatitis. He denies fevers or chills and he is able to tolerate diet. He had been on course of Prednisone for suspected autoimmune hepatitis but his symptoms did not improve. 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 He has never had another episode of coffee ground emesis or hematemesis in the past. His stools are green/brown in color- he denies melena or hematochezia. He has known peripancreatic fluid collections but these have never required drainage. He was able to eat breakfast this morning and is s/p 2L NS in ED. The patient was found to have INR >6 on arrival and was given K-centra and Vitamin K.        Allergies:  No Known Allergies      Home Medications:  Coumadin (pt unsure when he started this medication)  [facility] Medications:  docusate sodium 100 milliGRAM(s) Oral two times a day  enoxaparin Injectable 40 milliGRAM(s) SubCutaneous daily  lactated ringers. 1000 milliLiter(s) IV Continuous <Continuous>  pantoprazole  Injectable 40 milliGRAM(s) IV Push two times a day  sodium phosphate IVPB 15 milliMole(s) IV Intermittent once  sodium phosphate IVPB 30 milliMole(s) IV Intermittent once      PMHX/PSHX:  Chronic pancreatitis, unspecified pancreatitis type  No significant past surgical history      Family history:  No pertinent family history in first degree relatives      Social History: denies ETOH (drinks maybe 1 drink per year), no tobacco, no ETOH     ROS:     General:  No wt loss, fevers, chills, night sweats, fatigue,   Eyes:  Good vision, no reported pain  ENT:  No sore throat, pain, runny nose, dysphagia  CV:  No pain, palpitations, hypo/hypertension  Resp:  No dyspnea, cough, tachypnea, wheezing  GI:  + pain, No nausea, No vomiting, No diarrhea, No constipation, No weight loss, No fever, No pruritis, No rectal bleeding, No tarry stools, No dysphagia,  :  No pain, bleeding, incontinence, nocturia  Muscle:  No pain, weakness  Neuro:  No weakness, tingling, memory problems  Psych:  No fatigue, insomnia, mood problems, depression  Endocrine:  No polyuria, polydipsia, cold/heat intolerance  Heme:  No petechiae, ecchymosis, easy bruisability  Skin:  No rash, tattoos, scars, edema      PHYSICAL EXAM:     GENERAL:  Appears stated age, well-groomed, well-nourished, no distress  HEENT:  NC/AT,  conjunctivae clear and pink, no thyromegaly, nodules, adenopathy, no JVD, sclera -anicteric  CHEST:  Full & symmetric excursion, no increased effort, breath sounds clear  HEART:  Regular rhythm, S1, S2, no murmur  ABDOMEN:  Soft, epigastric tenderness to deep palpation, non-distended, normoactive bowel sounds, no masses   EXTEREMITIES:  no cyanosis,clubbing or edema  SKIN:  No rash/erythema, intact   NEURO:  Alert, oriented, no asterixis, no tremor, no encephalopathy    Vital Signs:  Vital Signs Last 24 Hrs  T(C): 37 (14 Sep 2018 11:46), Max: 37 (13 Sep 2018 14:36)  T(F): 98.6 (14 Sep 2018 11:46), Max: 98.6 (13 Sep 2018 14:36)  HR: 86 (14 Sep 2018 11:46) (77 - 114)  BP: 92/59 (14 Sep 2018 11:46) (92/59 - 106/67)  BP(mean): --  RR: 17 (14 Sep 2018 11:46) (16 - 20)  SpO2: 97% (14 Sep 2018 05:33) (95% - 100%)  Daily Height in cm: 170.18 (13 Sep 2018 13:42)    Daily     LABS:                        8.7    13.1  )-----------( 421      ( 14 Sep 2018 07:39 )             26.4       Hemoglobin: 8.4 g/dL (18 @ 12:13)  Hemoglobin: 8.7 g/dL (18 @ 07:39)  Hemoglobin: 9.2 g/dL (18 @ 20:33)  Hemoglobin: 9.8 g/dL (18 @ 17:14)  Hemoglobin: 11.9 g/dL (18 @ 14:48)    Mean Cell Volume: 90.4 fl (18 @ 07:39)        135  |  103  |  15  ----------------------------<  110<H>  4.2   |  23  |  0.69    Ca    8.2<L>      14 Sep 2018 07:39  Phos  1.9       Mg     2.0         TPro  6.5  /  Alb  3.1<L>  /  TBili  0.8  /  DBili  x   /  AST  33  /  ALT  51<H>  /  AlkPhos  107      LIVER FUNCTIONS - ( 13 Sep 2018 14:48 )  Alb: 3.1 g/dL / Pro: 6.5 g/dL / ALK PHOS: 107 U/L / ALT: 51 U/L / AST: 33 U/L / GGT: x           PT/INR - ( 14 Sep 2018 01:08 )   PT: 16.9 sec;   INR: 1.55 ratio         PTT - ( 13 Sep 2018 14:48 )  PTT:50.3 sec  Urinalysis Basic - ( 14 Sep 2018 00:14 )    Color: Light Orange / Appearance: Clear / S.023 / pH: x  Gluc: x / Ketone: Negative  / Bili: Negative / Urobili: Negative   Blood: x / Protein: Trace / Nitrite: Negative   Leuk Esterase: Negative / RBC: 4 /hpf / WBC 3 /hpf   Sq Epi: x / Non Sq Epi: 3 /hpf / Bacteria: Negative      Amylase Serum--      Lipase serum>530       Ammonia--  Amylase Serum--      Lipase serum>530       Ammonia--                          8.7    13.1  )-----------( 421      ( 14 Sep 2018 07:39 )             26.4                         9.2    16.6  )-----------( 476      ( 13 Sep 2018 20:33 )             29.6                         9.8    17.2  )-----------( 499      ( 13 Sep 2018 17:14 )             30.7                         11.9   18.8  )-----------( 552      ( 13 Sep 2018 14:48 )             38.2     Imaging:  no new imaging

## 2018-09-14 NOTE — PROGRESS NOTE ADULT - SUBJECTIVE AND OBJECTIVE BOX
Blue Team Surgery Progress Note     Subjective/24hour Events: Pain controlled. On regular diet. No N/V. No CP or SOB.    Vital Signs:  Vital Signs Last 24 Hrs  T(C): 36.7 (14 Sep 2018 05:33), Max: 37 (13 Sep 2018 14:36)  T(F): 98.1 (14 Sep 2018 05:33), Max: 98.6 (13 Sep 2018 14:36)  HR: 77 (14 Sep 2018 05:33) (77 - 114)  BP: 94/60 (14 Sep 2018 05:33) (94/60 - 106/67)  BP(mean): --  RR: 18 (14 Sep 2018 05:33) (16 - 20)  SpO2: 97% (14 Sep 2018 05:33) (95% - 100%)    CAPILLARY BLOOD GLUCOSE          I&O's Detail    13 Sep 2018 07:01  -  14 Sep 2018 07:00  --------------------------------------------------------  IN:    IV PiggyBack: 100 mL    lactated ringers.: 525 mL    Oral Fluid: 440 mL  Total IN: 1065 mL    OUT:    Voided: 600 mL  Total OUT: 600 mL    Total NET: 465 mL            MEDICATIONS  (STANDING):  docusate sodium 100 milliGRAM(s) Oral two times a day  enoxaparin Injectable 40 milliGRAM(s) SubCutaneous daily  lactated ringers. 1000 milliLiter(s) (75 mL/Hr) IV Continuous <Continuous>  pantoprazole    Tablet 40 milliGRAM(s) Oral before breakfast    MEDICATIONS  (PRN):        Physical Exam:  Gen: NAD  LS: nml respiratory effort  Card: pulse regularly present  GI: abd soft, epigastric tenderness w/ palaption  Ext: warm      Labs:    09-13    134<L>  |  98  |  19  ----------------------------<  142<H>  4.2   |  25  |  0.70    Ca    8.6      13 Sep 2018 14:48    TPro  6.5  /  Alb  3.1<L>  /  TBili  0.8  /  DBili  x   /  AST  33  /  ALT  51<H>  /  AlkPhos  107  09-13    LIVER FUNCTIONS - ( 13 Sep 2018 14:48 )  Alb: 3.1 g/dL / Pro: 6.5 g/dL / ALK PHOS: 107 U/L / ALT: 51 U/L / AST: 33 U/L / GGT: x                                 9.2    16.6  )-----------( 476      ( 13 Sep 2018 20:33 )             29.6     PT/INR - ( 14 Sep 2018 01:08 )   PT: 16.9 sec;   INR: 1.55 ratio         PTT - ( 13 Sep 2018 14:48 )  PTT:50.3 sec

## 2018-09-14 NOTE — CONSULT NOTE ADULT - ATTENDING COMMENTS
Patient seen and examined. Agree with above. Spoke to patient in native Cantonese. The patient reports having multiple bouts of pancreatitis of unknown etiology, first 1.5 years ago at [facility], then at [facility]. He has had multiple scans (5 in the last month without any diagnosis). He was tried on prednisone for the last 10 days prior to admission without any improvement in his pain. He denies ever having had GI bleeding before, never had vomiting of blood, melena or hematochezia.    We would obtain records from [facility]. Check IgG4 level. Consider MRI of abdomen if not done already at OSH, to r/o divisum, evaluate for fluid collection that can be drained. For the coffee ground emesis, we would perform EGD to evaluate the source given that he has never had EGD and has a history of splenic vein thrombosis - however he ate regular solids for breakfast this morning which precludes procedures today. Would plan for EGD Monday vs more emergently over the weekend if there are further signs of bleeding. Would place patient on PPI BID.

## 2018-09-14 NOTE — PROGRESS NOTE ADULT - ASSESSMENT
67M w/ PMHx of necrotizing pancreatitis, with demonstration of yandy-pancreatic fluid collections. Presented 9/14 with 1 episode of coffee-ground emesis and 3-4 days diffuse abdominal pain.    Plan:  - Pain control   - Regular diet  - Monitor labs (H/H)  - Determine recent meds  - Encourage OOB and ambulation

## 2018-09-15 LAB
ALBUMIN SERPL ELPH-MCNC: 2.5 G/DL — LOW (ref 3.3–5)
ALP SERPL-CCNC: 82 U/L — SIGNIFICANT CHANGE UP (ref 40–120)
ALT FLD-CCNC: 23 U/L — SIGNIFICANT CHANGE UP (ref 10–45)
ANION GAP SERPL CALC-SCNC: 10 MMOL/L — SIGNIFICANT CHANGE UP (ref 5–17)
ANION GAP SERPL CALC-SCNC: 9 MMOL/L — SIGNIFICANT CHANGE UP (ref 5–17)
APTT BLD: 35.5 SEC — SIGNIFICANT CHANGE UP (ref 27.5–37.4)
AST SERPL-CCNC: 13 U/L — SIGNIFICANT CHANGE UP (ref 10–40)
BILIRUB SERPL-MCNC: 0.5 MG/DL — SIGNIFICANT CHANGE UP (ref 0.2–1.2)
BLD GP AB SCN SERPL QL: NEGATIVE — SIGNIFICANT CHANGE UP
BUN SERPL-MCNC: 10 MG/DL — SIGNIFICANT CHANGE UP (ref 7–23)
BUN SERPL-MCNC: 10 MG/DL — SIGNIFICANT CHANGE UP (ref 7–23)
CALCIUM SERPL-MCNC: 8.2 MG/DL — LOW (ref 8.4–10.5)
CALCIUM SERPL-MCNC: 8.5 MG/DL — SIGNIFICANT CHANGE UP (ref 8.4–10.5)
CHLORIDE SERPL-SCNC: 102 MMOL/L — SIGNIFICANT CHANGE UP (ref 96–108)
CHLORIDE SERPL-SCNC: 103 MMOL/L — SIGNIFICANT CHANGE UP (ref 96–108)
CO2 SERPL-SCNC: 24 MMOL/L — SIGNIFICANT CHANGE UP (ref 22–31)
CO2 SERPL-SCNC: 24 MMOL/L — SIGNIFICANT CHANGE UP (ref 22–31)
CREAT SERPL-MCNC: 0.76 MG/DL — SIGNIFICANT CHANGE UP (ref 0.5–1.3)
CREAT SERPL-MCNC: 0.77 MG/DL — SIGNIFICANT CHANGE UP (ref 0.5–1.3)
GLUCOSE SERPL-MCNC: 101 MG/DL — HIGH (ref 70–99)
GLUCOSE SERPL-MCNC: 128 MG/DL — HIGH (ref 70–99)
HCT VFR BLD CALC: 15.6 % — CRITICAL LOW (ref 39–50)
HCT VFR BLD CALC: 25.5 % — LOW (ref 39–50)
HGB BLD-MCNC: 5 G/DL — CRITICAL LOW (ref 13–17)
HGB BLD-MCNC: 8.2 G/DL — LOW (ref 13–17)
INR BLD: 1.34 RATIO — HIGH (ref 0.88–1.16)
MAGNESIUM SERPL-MCNC: 2.1 MG/DL — SIGNIFICANT CHANGE UP (ref 1.6–2.6)
MCHC RBC-ENTMCNC: 28.8 PG — SIGNIFICANT CHANGE UP (ref 27–34)
MCHC RBC-ENTMCNC: 28.9 PG — SIGNIFICANT CHANGE UP (ref 27–34)
MCHC RBC-ENTMCNC: 32.2 GM/DL — SIGNIFICANT CHANGE UP (ref 32–36)
MCHC RBC-ENTMCNC: 32.3 GM/DL — SIGNIFICANT CHANGE UP (ref 32–36)
MCV RBC AUTO: 89.3 FL — SIGNIFICANT CHANGE UP (ref 80–100)
MCV RBC AUTO: 89.4 FL — SIGNIFICANT CHANGE UP (ref 80–100)
PHOSPHATE SERPL-MCNC: 2.4 MG/DL — LOW (ref 2.5–4.5)
PLATELET # BLD AUTO: 462 K/UL — HIGH (ref 150–400)
PLATELET # BLD AUTO: 505 K/UL — HIGH (ref 150–400)
POTASSIUM SERPL-MCNC: 3.3 MMOL/L — LOW (ref 3.5–5.3)
POTASSIUM SERPL-MCNC: 3.5 MMOL/L — SIGNIFICANT CHANGE UP (ref 3.5–5.3)
POTASSIUM SERPL-MCNC: 3.8 MMOL/L — SIGNIFICANT CHANGE UP (ref 3.5–5.3)
POTASSIUM SERPL-SCNC: 3.3 MMOL/L — LOW (ref 3.5–5.3)
POTASSIUM SERPL-SCNC: 3.5 MMOL/L — SIGNIFICANT CHANGE UP (ref 3.5–5.3)
POTASSIUM SERPL-SCNC: 3.8 MMOL/L — SIGNIFICANT CHANGE UP (ref 3.5–5.3)
PROT SERPL-MCNC: 5.6 G/DL — LOW (ref 6–8.3)
PROTHROM AB SERPL-ACNC: 14.6 SEC — HIGH (ref 9.8–12.7)
RBC # BLD: 1.75 M/UL — LOW (ref 4.2–5.8)
RBC # BLD: 2.85 M/UL — LOW (ref 4.2–5.8)
RBC # FLD: 15.7 % — HIGH (ref 10.3–14.5)
RBC # FLD: 15.8 % — HIGH (ref 10.3–14.5)
RH IG SCN BLD-IMP: POSITIVE — SIGNIFICANT CHANGE UP
RH IG SCN BLD-IMP: POSITIVE — SIGNIFICANT CHANGE UP
SODIUM SERPL-SCNC: 136 MMOL/L — SIGNIFICANT CHANGE UP (ref 135–145)
SODIUM SERPL-SCNC: 136 MMOL/L — SIGNIFICANT CHANGE UP (ref 135–145)
WBC # BLD: 11.9 K/UL — HIGH (ref 3.8–10.5)
WBC # BLD: 9.1 K/UL — SIGNIFICANT CHANGE UP (ref 3.8–10.5)
WBC # FLD AUTO: 11.9 K/UL — HIGH (ref 3.8–10.5)
WBC # FLD AUTO: 9.1 K/UL — SIGNIFICANT CHANGE UP (ref 3.8–10.5)

## 2018-09-15 RX ORDER — ENOXAPARIN SODIUM 100 MG/ML
40 INJECTION SUBCUTANEOUS DAILY
Qty: 0 | Refills: 0 | Status: DISCONTINUED | OUTPATIENT
Start: 2018-09-15 | End: 2018-09-18

## 2018-09-15 RX ORDER — POTASSIUM CHLORIDE 20 MEQ
40 PACKET (EA) ORAL ONCE
Qty: 0 | Refills: 0 | Status: COMPLETED | OUTPATIENT
Start: 2018-09-15 | End: 2018-09-15

## 2018-09-15 RX ORDER — POTASSIUM CHLORIDE 20 MEQ
20 PACKET (EA) ORAL ONCE
Qty: 0 | Refills: 0 | Status: DISCONTINUED | OUTPATIENT
Start: 2018-09-15 | End: 2018-09-15

## 2018-09-15 RX ADMIN — Medication 40 MILLIEQUIVALENT(S): at 14:27

## 2018-09-15 RX ADMIN — PANTOPRAZOLE SODIUM 40 MILLIGRAM(S): 20 TABLET, DELAYED RELEASE ORAL at 18:07

## 2018-09-15 RX ADMIN — PANTOPRAZOLE SODIUM 40 MILLIGRAM(S): 20 TABLET, DELAYED RELEASE ORAL at 06:00

## 2018-09-15 RX ADMIN — Medication 100 MILLIGRAM(S): at 06:00

## 2018-09-15 RX ADMIN — ENOXAPARIN SODIUM 40 MILLIGRAM(S): 100 INJECTION SUBCUTANEOUS at 14:27

## 2018-09-15 RX ADMIN — Medication 100 MILLIGRAM(S): at 18:07

## 2018-09-15 NOTE — PROGRESS NOTE ADULT - ASSESSMENT
67M w/ PMHx of necrotizing pancreatitis, with demonstration of yandy-pancreatic fluid collections. Presented 9/14 with 1 episode of coffee-ground emesis and 3-4 days diffuse abdominal pain.    Plan:  - Pain control   - Regular diet  - Monitor labs (H/H)  - GI to see patient      - f/u HI recs  - Encourage OOB and ambulation

## 2018-09-15 NOTE — PROGRESS NOTE ADULT - SUBJECTIVE AND OBJECTIVE BOX
Blue Team Surgery Progress Note     Subjective/24hour Events: No acute events overnight. Pain controlled. Tolerating diet. No N/V. No CP or SOB.    Vital Signs:  Vital Signs Last 24 Hrs  T(C): 36.7 (15 Sep 2018 12:50), Max: 37.8 (14 Sep 2018 13:31)  T(F): 98.1 (15 Sep 2018 12:50), Max: 100 (14 Sep 2018 13:31)  HR: 76 (15 Sep 2018 12:50) (72 - 90)  BP: 95/59 (15 Sep 2018 12:50) (93/57 - 96/59)  BP(mean): --  RR: 18 (15 Sep 2018 12:50) (17 - 18)  SpO2: 98% (15 Sep 2018 12:50) (96% - 99%)    CAPILLARY BLOOD GLUCOSE          I&O's Detail    14 Sep 2018 07:01  -  15 Sep 2018 07:00  --------------------------------------------------------  IN:    IV PiggyBack: 500 mL    Oral Fluid: 1260 mL  Total IN: 1760 mL    OUT:    Voided: 1525 mL  Total OUT: 1525 mL    Total NET: 235 mL            MEDICATIONS  (STANDING):  docusate sodium 100 milliGRAM(s) Oral two times a day  enoxaparin Injectable 40 milliGRAM(s) SubCutaneous daily  pantoprazole  Injectable 40 milliGRAM(s) IV Push two times a day  potassium chloride    Tablet ER 40 milliEquivalent(s) Oral once    MEDICATIONS  (PRN):        Physical Exam:  Gen: NAD  LS: nml respiratory effort  Card: pulse regularly present  GI: abd soft, minimal tenderness to palpation  Ext: warm      Labs:    09-15    136  |  102  |  10  ----------------------------<  128<H>  3.3<L>   |  24  |  0.76    Ca    8.5      15 Sep 2018 11:27  Phos  2.4     09-15  Mg     2.1     09-15    TPro  5.6<L>  /  Alb  2.5<L>  /  TBili  0.5  /  DBili  x   /  AST  13  /  ALT  23  /  AlkPhos  82  09-15    LIVER FUNCTIONS - ( 15 Sep 2018 10:27 )  Alb: 2.5 g/dL / Pro: 5.6 g/dL / ALK PHOS: 82 U/L / ALT: 23 U/L / AST: 13 U/L / GGT: x                                 8.2    9.1   )-----------( 462      ( 15 Sep 2018 11:27 )             25.5     PT/INR - ( 15 Sep 2018 10:27 )   PT: 14.6 sec;   INR: 1.34 ratio         PTT - ( 15 Sep 2018 10:27 )  PTT:35.5 sec

## 2018-09-16 LAB
ANION GAP SERPL CALC-SCNC: 11 MMOL/L — SIGNIFICANT CHANGE UP (ref 5–17)
APTT BLD: 40.8 SEC — HIGH (ref 27.5–37.4)
BLD GP AB SCN SERPL QL: NEGATIVE — SIGNIFICANT CHANGE UP
BUN SERPL-MCNC: 11 MG/DL — SIGNIFICANT CHANGE UP (ref 7–23)
CALCIUM SERPL-MCNC: 8.8 MG/DL — SIGNIFICANT CHANGE UP (ref 8.4–10.5)
CHLORIDE SERPL-SCNC: 101 MMOL/L — SIGNIFICANT CHANGE UP (ref 96–108)
CO2 SERPL-SCNC: 23 MMOL/L — SIGNIFICANT CHANGE UP (ref 22–31)
CREAT SERPL-MCNC: 0.76 MG/DL — SIGNIFICANT CHANGE UP (ref 0.5–1.3)
GLUCOSE SERPL-MCNC: 98 MG/DL — SIGNIFICANT CHANGE UP (ref 70–99)
HCT VFR BLD CALC: 24.8 % — LOW (ref 39–50)
HCT VFR BLD CALC: 26.2 % — LOW (ref 39–50)
HGB BLD-MCNC: 7.6 G/DL — LOW (ref 13–17)
HGB BLD-MCNC: 8.1 G/DL — LOW (ref 13–17)
INR BLD: 1.5 RATIO — HIGH (ref 0.88–1.16)
MAGNESIUM SERPL-MCNC: 2.1 MG/DL — SIGNIFICANT CHANGE UP (ref 1.6–2.6)
MCHC RBC-ENTMCNC: 25.8 PG — LOW (ref 27–34)
MCHC RBC-ENTMCNC: 28.8 PG — SIGNIFICANT CHANGE UP (ref 27–34)
MCHC RBC-ENTMCNC: 29.1 GM/DL — LOW (ref 32–36)
MCHC RBC-ENTMCNC: 32.6 GM/DL — SIGNIFICANT CHANGE UP (ref 32–36)
MCV RBC AUTO: 88.4 FL — SIGNIFICANT CHANGE UP (ref 80–100)
MCV RBC AUTO: 88.9 FL — SIGNIFICANT CHANGE UP (ref 80–100)
PHOSPHATE SERPL-MCNC: 2.3 MG/DL — LOW (ref 2.5–4.5)
PLATELET # BLD AUTO: 453 K/UL — HIGH (ref 150–400)
PLATELET # BLD AUTO: 487 K/UL — HIGH (ref 150–400)
POTASSIUM SERPL-MCNC: 3.9 MMOL/L — SIGNIFICANT CHANGE UP (ref 3.5–5.3)
POTASSIUM SERPL-SCNC: 3.9 MMOL/L — SIGNIFICANT CHANGE UP (ref 3.5–5.3)
PROTHROM AB SERPL-ACNC: 16.3 SEC — HIGH (ref 9.8–12.7)
RBC # BLD: 2.81 M/UL — LOW (ref 4.2–5.8)
RBC # BLD: 2.95 M/UL — LOW (ref 4.2–5.8)
RBC # FLD: 15.4 % — HIGH (ref 10.3–14.5)
RBC # FLD: 15.5 % — HIGH (ref 10.3–14.5)
RH IG SCN BLD-IMP: POSITIVE — SIGNIFICANT CHANGE UP
SODIUM SERPL-SCNC: 135 MMOL/L — SIGNIFICANT CHANGE UP (ref 135–145)
WBC # BLD: 9.3 K/UL — SIGNIFICANT CHANGE UP (ref 3.8–10.5)
WBC # BLD: 9.8 K/UL — SIGNIFICANT CHANGE UP (ref 3.8–10.5)
WBC # FLD AUTO: 9.3 K/UL — SIGNIFICANT CHANGE UP (ref 3.8–10.5)
WBC # FLD AUTO: 9.8 K/UL — SIGNIFICANT CHANGE UP (ref 3.8–10.5)

## 2018-09-16 RX ORDER — LACTULOSE 10 G/15ML
200 SOLUTION ORAL ONCE
Qty: 0 | Refills: 0 | Status: DISCONTINUED | OUTPATIENT
Start: 2018-09-16 | End: 2018-09-18

## 2018-09-16 RX ADMIN — PANTOPRAZOLE SODIUM 40 MILLIGRAM(S): 20 TABLET, DELAYED RELEASE ORAL at 05:47

## 2018-09-16 RX ADMIN — Medication 100 MILLIGRAM(S): at 05:47

## 2018-09-16 RX ADMIN — ENOXAPARIN SODIUM 40 MILLIGRAM(S): 100 INJECTION SUBCUTANEOUS at 12:51

## 2018-09-16 RX ADMIN — Medication 100 MILLIGRAM(S): at 17:53

## 2018-09-16 RX ADMIN — PANTOPRAZOLE SODIUM 40 MILLIGRAM(S): 20 TABLET, DELAYED RELEASE ORAL at 17:53

## 2018-09-16 NOTE — PROGRESS NOTE ADULT - ASSESSMENT
67M w/ PMHx of necrotizing pancreatitis, with demonstration of yandy-pancreatic fluid collections. Presented 9/14 with 1 episode of coffee-ground emesis and 3-4 days diffuse abdominal pain.    Plan:  - Pain control   - Regular diet  - Monitor labs (H/H)  - Encourage OOB and ambulation

## 2018-09-17 LAB
ANION GAP SERPL CALC-SCNC: 12 MMOL/L — SIGNIFICANT CHANGE UP (ref 5–17)
ANION GAP SERPL CALC-SCNC: 14 MMOL/L — SIGNIFICANT CHANGE UP (ref 5–17)
APTT BLD: 39.1 SEC — HIGH (ref 27.5–37.4)
APTT BLD: 39.2 SEC — HIGH (ref 27.5–37.4)
BUN SERPL-MCNC: 11 MG/DL — SIGNIFICANT CHANGE UP (ref 7–23)
BUN SERPL-MCNC: 12 MG/DL — SIGNIFICANT CHANGE UP (ref 7–23)
CALCIUM SERPL-MCNC: 8.4 MG/DL — SIGNIFICANT CHANGE UP (ref 8.4–10.5)
CALCIUM SERPL-MCNC: 8.5 MG/DL — SIGNIFICANT CHANGE UP (ref 8.4–10.5)
CHLORIDE SERPL-SCNC: 101 MMOL/L — SIGNIFICANT CHANGE UP (ref 96–108)
CHLORIDE SERPL-SCNC: 101 MMOL/L — SIGNIFICANT CHANGE UP (ref 96–108)
CO2 SERPL-SCNC: 23 MMOL/L — SIGNIFICANT CHANGE UP (ref 22–31)
CO2 SERPL-SCNC: 23 MMOL/L — SIGNIFICANT CHANGE UP (ref 22–31)
CREAT SERPL-MCNC: 0.75 MG/DL — SIGNIFICANT CHANGE UP (ref 0.5–1.3)
CREAT SERPL-MCNC: 0.87 MG/DL — SIGNIFICANT CHANGE UP (ref 0.5–1.3)
GLUCOSE SERPL-MCNC: 121 MG/DL — HIGH (ref 70–99)
GLUCOSE SERPL-MCNC: 97 MG/DL — SIGNIFICANT CHANGE UP (ref 70–99)
HCT VFR BLD CALC: 24.1 % — LOW (ref 39–50)
HCT VFR BLD CALC: 24.9 % — LOW (ref 39–50)
HGB BLD-MCNC: 7.8 G/DL — LOW (ref 13–17)
HGB BLD-MCNC: 8.1 G/DL — LOW (ref 13–17)
IGG4 SER-MCNC: 124 MG/DL — HIGH (ref 2.4–121)
INR BLD: 1.45 RATIO — HIGH (ref 0.88–1.16)
INR BLD: 1.6 RATIO — HIGH (ref 0.88–1.16)
MAGNESIUM SERPL-MCNC: 2.2 MG/DL — SIGNIFICANT CHANGE UP (ref 1.6–2.6)
MAGNESIUM SERPL-MCNC: 2.2 MG/DL — SIGNIFICANT CHANGE UP (ref 1.6–2.6)
MCHC RBC-ENTMCNC: 28.6 PG — SIGNIFICANT CHANGE UP (ref 27–34)
MCHC RBC-ENTMCNC: 28.7 PG — SIGNIFICANT CHANGE UP (ref 27–34)
MCHC RBC-ENTMCNC: 32.2 GM/DL — SIGNIFICANT CHANGE UP (ref 32–36)
MCHC RBC-ENTMCNC: 32.6 GM/DL — SIGNIFICANT CHANGE UP (ref 32–36)
MCV RBC AUTO: 88.1 FL — SIGNIFICANT CHANGE UP (ref 80–100)
MCV RBC AUTO: 88.7 FL — SIGNIFICANT CHANGE UP (ref 80–100)
PHOSPHATE SERPL-MCNC: 2.5 MG/DL — SIGNIFICANT CHANGE UP (ref 2.5–4.5)
PHOSPHATE SERPL-MCNC: 2.6 MG/DL — SIGNIFICANT CHANGE UP (ref 2.5–4.5)
PLATELET # BLD AUTO: 483 K/UL — HIGH (ref 150–400)
PLATELET # BLD AUTO: 521 K/UL — HIGH (ref 150–400)
POTASSIUM SERPL-MCNC: 3.9 MMOL/L — SIGNIFICANT CHANGE UP (ref 3.5–5.3)
POTASSIUM SERPL-MCNC: 4.1 MMOL/L — SIGNIFICANT CHANGE UP (ref 3.5–5.3)
POTASSIUM SERPL-SCNC: 3.9 MMOL/L — SIGNIFICANT CHANGE UP (ref 3.5–5.3)
POTASSIUM SERPL-SCNC: 4.1 MMOL/L — SIGNIFICANT CHANGE UP (ref 3.5–5.3)
PROTHROM AB SERPL-ACNC: 15.8 SEC — HIGH (ref 9.8–12.7)
PROTHROM AB SERPL-ACNC: 17.5 SEC — HIGH (ref 9.8–12.7)
RBC # BLD: 2.72 M/UL — LOW (ref 4.2–5.8)
RBC # BLD: 2.82 M/UL — LOW (ref 4.2–5.8)
RBC # FLD: 15.4 % — HIGH (ref 10.3–14.5)
RBC # FLD: 15.8 % — HIGH (ref 10.3–14.5)
SODIUM SERPL-SCNC: 136 MMOL/L — SIGNIFICANT CHANGE UP (ref 135–145)
SODIUM SERPL-SCNC: 138 MMOL/L — SIGNIFICANT CHANGE UP (ref 135–145)
WBC # BLD: 7.2 K/UL — SIGNIFICANT CHANGE UP (ref 3.8–10.5)
WBC # BLD: 7.7 K/UL — SIGNIFICANT CHANGE UP (ref 3.8–10.5)
WBC # FLD AUTO: 7.2 K/UL — SIGNIFICANT CHANGE UP (ref 3.8–10.5)
WBC # FLD AUTO: 7.7 K/UL — SIGNIFICANT CHANGE UP (ref 3.8–10.5)

## 2018-09-17 PROCEDURE — 99232 SBSQ HOSP IP/OBS MODERATE 35: CPT | Mod: GC

## 2018-09-17 RX ORDER — SODIUM CHLORIDE 9 MG/ML
1000 INJECTION, SOLUTION INTRAVENOUS
Qty: 0 | Refills: 0 | Status: DISCONTINUED | OUTPATIENT
Start: 2018-09-17 | End: 2018-09-18

## 2018-09-17 RX ADMIN — Medication 100 MILLIGRAM(S): at 05:15

## 2018-09-17 RX ADMIN — PANTOPRAZOLE SODIUM 40 MILLIGRAM(S): 20 TABLET, DELAYED RELEASE ORAL at 17:20

## 2018-09-17 RX ADMIN — PANTOPRAZOLE SODIUM 40 MILLIGRAM(S): 20 TABLET, DELAYED RELEASE ORAL at 05:15

## 2018-09-17 RX ADMIN — Medication 100 MILLIGRAM(S): at 17:20

## 2018-09-17 RX ADMIN — ENOXAPARIN SODIUM 40 MILLIGRAM(S): 100 INJECTION SUBCUTANEOUS at 13:12

## 2018-09-17 NOTE — PHYSICAL THERAPY INITIAL EVALUATION ADULT - DISCHARGE DISPOSITION, PT EVAL
Home with Home PT for strengthening, bed mob, transfer, gait and balance training, home with assist as needed/home w/ home PT/home/home w/ assist

## 2018-09-17 NOTE — PROGRESS NOTE ADULT - SUBJECTIVE AND OBJECTIVE BOX
General Surgery Progress Note    SUBJECTIVE:  The patient was seen and examined. Overnight, pt had one episode of tarry stools with streaks of blood. Enema was held, CBC performed which was WNL (7.6/26.5 to 8.1/24.8). No further episodes today. Denies abd pain, N/V.     OBJECTIVE:     ** VITAL SIGNS / I&O's **    Vital Signs Last 24 Hrs  T(C): 37.2 (17 Sep 2018 13:34), Max: 37.2 (16 Sep 2018 17:54)  T(F): 98.9 (17 Sep 2018 13:34), Max: 99 (16 Sep 2018 17:54)  HR: 93 (17 Sep 2018 13:34) (85 - 94)  BP: 13/67 (17 Sep 2018 13:34) (13/67 - 99/63)  BP(mean): --  RR: 19 (17 Sep 2018 13:34) (17 - 19)  SpO2: 98% (17 Sep 2018 13:34) (97% - 98%)      16 Sep 2018 07:01  -  17 Sep 2018 07:00  --------------------------------------------------------  IN:    Oral Fluid: 1260 mL  Total IN: 1260 mL    OUT:    Voided: 950 mL  Total OUT: 950 mL    Total NET: 310 mL      17 Sep 2018 07:01  -  17 Sep 2018 14:19  --------------------------------------------------------  IN:    Oral Fluid: 550 mL  Total IN: 550 mL    OUT:  Total OUT: 0 mL    Total NET: 550 mL          ** PHYSICAL EXAM **    -- CONSTITUTIONAL: Alert, NAD  -- PULMONARY: non-labored respirations  -- ABDOMEN: soft, non-distended, non-tender  -- NEURO: A&Ox3    ** LABS **                          7.8    7.7   )-----------( 483      ( 17 Sep 2018 06:36 )             24.1     17 Sep 2018 06:35    136    |  101    |  11     ----------------------------<  97     3.9     |  23     |  0.75     Ca    8.4        17 Sep 2018 06:35  Phos  2.6       17 Sep 2018 06:35  Mg     2.2       17 Sep 2018 06:35      PT/INR - ( 17 Sep 2018 06:35 )   PT: 17.5 sec;   INR: 1.60 ratio         PTT - ( 17 Sep 2018 06:35 )  PTT:39.2 sec  CAPILLARY BLOOD GLUCOSE                    MEDICATIONS  (STANDING):  docusate sodium 100 milliGRAM(s) Oral two times a day  enoxaparin Injectable 40 milliGRAM(s) SubCutaneous daily  lactated ringers. 1000 milliLiter(s) (75 mL/Hr) IV Continuous <Continuous>  lactulose Retention Enema 200 Gram(s) Rectal once  pantoprazole  Injectable 40 milliGRAM(s) IV Push two times a day    MEDICATIONS  (PRN):

## 2018-09-17 NOTE — PROGRESS NOTE ADULT - ASSESSMENT
Impression:  1)Coffee-ground emesis- concern for upper GI bleed in setting of supratherapeutic INR, differential includes gastric varices (2/2 splenic vein thrombosis), peptic ulcer disease, esophagitis, angioectasia. NO evidence of bowel obstruction or ileus at this time. Drop in Hgb from arrival without overt GI bleeding.   2)Supratherapeutic INR- corrected  3)Necrotizing pancreatitis- the patient does not appear to be infected currently; unsure of status of peripancreatic fluid collections but current pain appears to be more 2/2 acute on chronic pancreatitis. Unclear etiology of pancreatitis.     Recommendations:  -monitor Hgb daily and monitor bowel movements  -c/w PPI IV BID  -check IgG4 level and fasting triglyceride level  -please obtain outpatient records as to workup for pancreatitis in past   -coumadin education and management   -plan for EGD tomorrow- please make NPO past MN  Risks, benefits, alternatives described to patient including, but not limited to, bleeding, infection, organ damage, perforation, missed lesions and risks of anesthesia. Patient understands and agrees to these risks.    Please call with questions  Karly Solo  GI Fellow  Pager: [phone]

## 2018-09-17 NOTE — PROGRESS NOTE ADULT - SUBJECTIVE AND OBJECTIVE BOX
Chief Complaint:  Patient is a 67y old  Male who presents with a chief complaint of Coffee ground emesis (14 Sep 2018 12:23)      Interval Events:   Patient without further episodes of nausea or vomiting over the weekend. He was eating breakfast this morning. His abdominal pain is improved. He denies fevers or chills.     Hospital Medications:  docusate sodium 100 milliGRAM(s) Oral two times a day  enoxaparin Injectable 40 milliGRAM(s) SubCutaneous daily  lactulose Retention Enema 200 Gram(s) Rectal once  pantoprazole  Injectable 40 milliGRAM(s) IV Push two times a day        PHYSICAL EXAM:   Vital Signs:  Vital Signs Last 24 Hrs  T(C): 37.2 (17 Sep 2018 05:14), Max: 37.6 (16 Sep 2018 09:30)  T(F): 98.9 (17 Sep 2018 05:14), Max: 99.7 (16 Sep 2018 09:30)  HR: 92 (17 Sep 2018 05:14) (84 - 94)  BP: 99/63 (17 Sep 2018 05:14) (92/59 - 99/66)  BP(mean): --  RR: 17 (17 Sep 2018 05:14) (17 - 18)  SpO2: 98% (17 Sep 2018 05:14) (97% - 98%)  Daily     Daily     GENERAL:  Appears stated age,  no distress  HEENT:   sclera -anicteric  CHEST:   no increased effort, breath sounds clear  HEART:  Regular rhythm, S1, S2,   ABDOMEN:  Soft, non-tender, non-distended, normoactive bowel sounds,    EXTEREMITIES:  no cyanosis,clubbing or edema  SKIN:  No rash/no jaundice   NEURO:  Alert, oriented    LABS:                            7.8    7.7   )-----------( 483      ( 17 Sep 2018 06:36 )             24.1     Hemoglobin: 7.8 g/dL (09-17-18 @ 06:36)  Hemoglobin: 8.1 g/dL (09-16-18 @ 22:10)  Hemoglobin: 7.6 g/dL (09-16-18 @ 06:36)  Hemoglobin: 8.2 g/dL (09-15-18 @ 11:27)  Hemoglobin: 5.0 g/dL (09-15-18 @ 10:27)    Hemoglobin: 11.9 g/dL (09.13.18 @ 14:48)    Mean Cell Volume: 88.7 fl (09-17-18 @ 06:36)    09-17    136  |  101  |  11  ----------------------------<  97  3.9   |  23  |  0.75    Ca    8.4      17 Sep 2018 06:35  Phos  2.6     09-17  Mg     2.2     09-17    TPro  5.6<L>  /  Alb  2.5<L>  /  TBili  0.5  /  DBili  x   /  AST  13  /  ALT  23  /  AlkPhos  82  09-15    LIVER FUNCTIONS - ( 15 Sep 2018 10:27 )  Alb: 2.5 g/dL / Pro: 5.6 g/dL / ALK PHOS: 82 U/L / ALT: 23 U/L / AST: 13 U/L / GGT: x           PT/INR - ( 17 Sep 2018 06:35 )   PT: 17.5 sec;   INR: 1.60 ratio         PTT - ( 17 Sep 2018 06:35 )  PTT:39.2 sec                            7.8    7.7   )-----------( 483      ( 17 Sep 2018 06:36 )             24.1                         8.1    9.3   )-----------( 453      ( 16 Sep 2018 22:10 )             24.8                         7.6    9.8   )-----------( 487      ( 16 Sep 2018 06:36 )             26.2                         8.2    9.1   )-----------( 462      ( 15 Sep 2018 11:27 )             25.5                         5.0    11.9  )-----------( 505      ( 15 Sep 2018 10:27 )             15.6     Imaging:  no new imaging

## 2018-09-17 NOTE — PROGRESS NOTE ADULT - ASSESSMENT
67M w/ PMHx of necrotizing pancreatitis, with demonstration of yandy-pancreatic fluid collections. Presented on 9/14 with 1 episode of coffee-ground emesis and 3-4 days diffuse abdominal pain.    Plan:  - EGD to be performed tomorrow  - NPO at midnight with IVF  - Reg diet until midnight 9/18  - Monitor labs (H/H)  - Encourage OOB and ambulation  - DISPO: to rehab     Reyna Tenorio, PGY-1  Blue Team General Surgery [phone]

## 2018-09-18 ENCOUNTER — TRANSCRIPTION ENCOUNTER (OUTPATIENT)
Age: 68
End: 2018-09-18

## 2018-09-18 VITALS
RESPIRATION RATE: 18 BRPM | HEART RATE: 86 BPM | OXYGEN SATURATION: 97 % | DIASTOLIC BLOOD PRESSURE: 64 MMHG | SYSTOLIC BLOOD PRESSURE: 99 MMHG | TEMPERATURE: 98 F

## 2018-09-18 PROCEDURE — 81001 URINALYSIS AUTO W/SCOPE: CPT

## 2018-09-18 PROCEDURE — 83605 ASSAY OF LACTIC ACID: CPT

## 2018-09-18 PROCEDURE — 84478 ASSAY OF TRIGLYCERIDES: CPT

## 2018-09-18 PROCEDURE — 80053 COMPREHEN METABOLIC PANEL: CPT

## 2018-09-18 PROCEDURE — 82787 IGG 1 2 3 OR 4 EACH: CPT

## 2018-09-18 PROCEDURE — 82435 ASSAY OF BLOOD CHLORIDE: CPT

## 2018-09-18 PROCEDURE — 82803 BLOOD GASES ANY COMBINATION: CPT

## 2018-09-18 PROCEDURE — 85610 PROTHROMBIN TIME: CPT

## 2018-09-18 PROCEDURE — 83690 ASSAY OF LIPASE: CPT

## 2018-09-18 PROCEDURE — 97162 PT EVAL MOD COMPLEX 30 MIN: CPT

## 2018-09-18 PROCEDURE — 96375 TX/PRO/DX INJ NEW DRUG ADDON: CPT

## 2018-09-18 PROCEDURE — 82330 ASSAY OF CALCIUM: CPT

## 2018-09-18 PROCEDURE — 86850 RBC ANTIBODY SCREEN: CPT

## 2018-09-18 PROCEDURE — 84132 ASSAY OF SERUM POTASSIUM: CPT

## 2018-09-18 PROCEDURE — 99285 EMERGENCY DEPT VISIT HI MDM: CPT | Mod: 25

## 2018-09-18 PROCEDURE — 84100 ASSAY OF PHOSPHORUS: CPT

## 2018-09-18 PROCEDURE — 84295 ASSAY OF SERUM SODIUM: CPT

## 2018-09-18 PROCEDURE — 96365 THER/PROPH/DIAG IV INF INIT: CPT

## 2018-09-18 PROCEDURE — 82272 OCCULT BLD FECES 1-3 TESTS: CPT

## 2018-09-18 PROCEDURE — 83735 ASSAY OF MAGNESIUM: CPT

## 2018-09-18 PROCEDURE — 85730 THROMBOPLASTIN TIME PARTIAL: CPT

## 2018-09-18 PROCEDURE — 85027 COMPLETE CBC AUTOMATED: CPT

## 2018-09-18 PROCEDURE — 85014 HEMATOCRIT: CPT

## 2018-09-18 PROCEDURE — 96366 THER/PROPH/DIAG IV INF ADDON: CPT

## 2018-09-18 PROCEDURE — 86901 BLOOD TYPING SEROLOGIC RH(D): CPT

## 2018-09-18 PROCEDURE — 80048 BASIC METABOLIC PNL TOTAL CA: CPT

## 2018-09-18 PROCEDURE — 86900 BLOOD TYPING SEROLOGIC ABO: CPT

## 2018-09-18 PROCEDURE — 82565 ASSAY OF CREATININE: CPT

## 2018-09-18 PROCEDURE — 96367 TX/PROPH/DG ADDL SEQ IV INF: CPT

## 2018-09-18 PROCEDURE — 82947 ASSAY GLUCOSE BLOOD QUANT: CPT

## 2018-09-18 RX ORDER — DOCUSATE SODIUM 100 MG
1 CAPSULE ORAL
Qty: 0 | Refills: 0 | COMMUNITY

## 2018-09-18 RX ORDER — PANTOPRAZOLE SODIUM 20 MG/1
1 TABLET, DELAYED RELEASE ORAL
Qty: 60 | Refills: 0
Start: 2018-09-18

## 2018-09-18 RX ORDER — PANTOPRAZOLE SODIUM 20 MG/1
40 TABLET, DELAYED RELEASE ORAL
Qty: 0 | Refills: 0 | COMMUNITY
Start: 2018-09-18

## 2018-09-18 RX ORDER — MEGESTROL ACETATE 40 MG/ML
6.3 SUSPENSION ORAL
Qty: 100 | Refills: 0 | OUTPATIENT
Start: 2018-09-18 | End: 2018-10-17

## 2018-09-18 RX ORDER — PANTOPRAZOLE SODIUM 20 MG/1
1 TABLET, DELAYED RELEASE ORAL
Qty: 0 | Refills: 0 | COMMUNITY

## 2018-09-18 RX ADMIN — ENOXAPARIN SODIUM 40 MILLIGRAM(S): 100 INJECTION SUBCUTANEOUS at 18:06

## 2018-09-18 RX ADMIN — SODIUM CHLORIDE 75 MILLILITER(S): 9 INJECTION, SOLUTION INTRAVENOUS at 00:00

## 2018-09-18 RX ADMIN — PANTOPRAZOLE SODIUM 40 MILLIGRAM(S): 20 TABLET, DELAYED RELEASE ORAL at 18:06

## 2018-09-18 RX ADMIN — Medication 100 MILLIGRAM(S): at 18:06

## 2018-09-18 RX ADMIN — PANTOPRAZOLE SODIUM 40 MILLIGRAM(S): 20 TABLET, DELAYED RELEASE ORAL at 06:02

## 2018-09-18 RX ADMIN — Medication 62.5 MILLIMOLE(S): at 06:02

## 2018-09-18 NOTE — DISCHARGE NOTE ADULT - PATIENT PORTAL LINK FT
You can access the [facility] Patient Portal, offered by [facility], by registering with the following website: [email or web address]

## 2018-09-18 NOTE — PROGRESS NOTE ADULT - SUBJECTIVE AND OBJECTIVE BOX
Pre-Endoscopy Evaluation      Referring Physician:  abigail silverio                                  Procedure: egd    Indication for Procedure: gib    Pertinent History: 67y male with PMHx of necrotizing Pancreatitis, with yandy-pancreatic fluid collections. Presented on 9/14 with 1 episode of coffee-ground emesis and diffuse abdominal pain.      Sedation by Anesthesia [x]    PAST MEDICAL & SURGICAL HISTORY:  Chronic pancreatitis, unspecified pancreatitis type  No significant past surgical history      PMH of Gastroparesis [ ]  Gastric Surgery [ ]  Gastric Outlet Obstruction [ ]    Allergies:    No Known Allergies    Intolerances;    Latex allergy: [ ] yes [x] no    Medications:MEDICATIONS  (STANDING):  docusate sodium 100 milliGRAM(s) Oral two times a day  enoxaparin Injectable 40 milliGRAM(s) SubCutaneous daily  lactated ringers. 1000 milliLiter(s) (75 mL/Hr) IV Continuous <Continuous>  lactulose Retention Enema 200 Gram(s) Rectal once  pantoprazole  Injectable 40 milliGRAM(s) IV Push two times a day    MEDICATIONS  (PRN):      Smoking: [ ] yes  [x] no    AICD/PPM: [ ] yes   [x] no    Pertinent lab data:                        8.1    7.2   )-----------( 521      ( 17 Sep 2018 22:58 )             24.9     09-17    138  |  101  |  12  ----------------------------<  121<H>  4.1   |  23  |  0.87    Ca    8.5      17 Sep 2018 22:58  Phos  2.5     09-17  Mg     2.2     09-17      PT/INR - ( 17 Sep 2018 22:58 )   PT: 15.8 sec;   INR: 1.45 ratio       PTT - ( 17 Sep 2018 22:58 )  PTT:39.1 sec      Physical Examination:    Daily   Vital Signs Last 24 Hrs  T(C): 37.2 (18 Sep 2018 09:18), Max: 37.4 (18 Sep 2018 01:13)  T(F): 99 (18 Sep 2018 09:18), Max: 99.4 (18 Sep 2018 01:13)  HR: 81 (18 Sep 2018 09:18) (78 - 93)  BP: 95/59 (18 Sep 2018 09:18) (13/67 - 99/62)  BP(mean): --  RR: 16 (18 Sep 2018 09:18) (16 - 19)  SpO2: 98% (18 Sep 2018 09:18) (97% - 98%)    Drug Dosing Weight  Height (cm): 170.18 (13 Sep 2018 13:42)  Weight (kg): 54.4 (13 Sep 2018 13:42)  BMI (kg/m2): 18.8 (13 Sep 2018 13:42)  BSA (m2): 1.63 (13 Sep 2018 13:42)          Constitutional: NAD    Neck:  No JVD    Respiratory: CTAB/L    Cardiovascular: S1 and S2    Gastrointestinal: BS+, soft, NT/ND    Extremities: No peripheral edema    Neurological: A/O x 3    : No Esposito    Skin: No rashes    Comments:    ASA Class: I [ ]  II [ ]  III [X]  IV [ ]    The patient is a suitable candidate for the planned procedure unless box checked [ ]  No, explain:

## 2018-09-18 NOTE — DISCHARGE NOTE ADULT - CARE PROVIDERS DIRECT ADDRESSES
[email or web address]

## 2018-09-18 NOTE — PROGRESS NOTE ADULT - SUBJECTIVE AND OBJECTIVE BOX
General Surgery Progress Note Blue Team     SUBJECTIVE:  The patient was seen and examined. No acute overnight events. No nausea or vomiting.     OBJECTIVE:       Vital Signs Last 24 Hrs  T(C): 37.4 (18 Sep 2018 06:30), Max: 37.4 (18 Sep 2018 01:13)  T(F): 99.3 (18 Sep 2018 06:30), Max: 99.4 (18 Sep 2018 01:13)  HR: 78 (18 Sep 2018 06:30) (78 - 93)  BP: 97/63 (18 Sep 2018 06:30) (13/67 - 99/62)  BP(mean): --  RR: 18 (18 Sep 2018 06:30) (18 - 19)  SpO2: 97% (18 Sep 2018 06:30) (97% - 98%)    I&O's Detail    17 Sep 2018 07:01  -  18 Sep 2018 07:00  --------------------------------------------------------  IN:    lactated ringers.: 900 mL    Oral Fluid: 1150 mL  Total IN: 2050 mL    OUT:    Voided: 550 mL  Total OUT: 550 mL    Total NET: 1500 mL          MEDICATIONS  (STANDING):  docusate sodium 100 milliGRAM(s) Oral two times a day  enoxaparin Injectable 40 milliGRAM(s) SubCutaneous daily  lactated ringers. 1000 milliLiter(s) (75 mL/Hr) IV Continuous <Continuous>  lactulose Retention Enema 200 Gram(s) Rectal once  pantoprazole  Injectable 40 milliGRAM(s) IV Push two times a day    MEDICATIONS  (PRN):      LABS:                        8.1    7.2   )-----------( 521      ( 17 Sep 2018 22:58 )             24.9     09-17    138  |  101  |  12  ----------------------------<  121<H>  4.1   |  23  |  0.87    Ca    8.5      17 Sep 2018 22:58  Phos  2.5     09-17  Mg     2.2     09-17      PT/INR - ( 17 Sep 2018 22:58 )   PT: 15.8 sec;   INR: 1.45 ratio         PTT - ( 17 Sep 2018 22:58 )  PTT:39.1 sec           PHYSICAL EXAM     CONSTITUTIONAL: Alert, NAD  PULMONARY: non-labored respirations  ABDOMEN: soft, non-distended, non-tender   NEURO: A&Ox3

## 2018-09-18 NOTE — DISCHARGE NOTE ADULT - MEDICATION SUMMARY - MEDICATIONS TO TAKE
I will START or STAY ON the medications listed below when I get home from the hospital:  None I will START or STAY ON the medications listed below when I get home from the hospital:    Protonix 40 mg oral delayed release tablet  -- 1 tab(s) by mouth 2 times a day  -- Indication: For esophogitis I will START or STAY ON the medications listed below when I get home from the hospital:    megestrol 625 mg/5 mL oral suspension  -- 6.3 milliliter(s) by mouth once a day   -- Do not take this drug if you are pregnant.  Shake well before use.    -- Indication: For appetite stimulant     Protonix 40 mg oral delayed release tablet  -- 1 tab(s) by mouth 2 times a day  -- Indication: For esophogitis

## 2018-09-18 NOTE — DISCHARGE NOTE ADULT - PLAN OF CARE
resolution of symptoms Follow up with Dr Hinojosa in 1 week. Please call [phone]  Diet: regular diet  Activity: encourage normal activity.

## 2018-09-18 NOTE — DISCHARGE NOTE ADULT - CARE PROVIDER_API CALL
Malik Hinojosa), Surgery  [address] [place] [address]  Phone: [phone]  Fax: [phone] Malik Hinojosa), Surgery  [address] [place] [address]  Phone: [phone]  Fax: [phone]    Luis Wilcox), Internal Medicine  [address] [place] [address]  Phone: [phone]  Fax: [phone]

## 2018-09-18 NOTE — DISCHARGE NOTE ADULT - CARE PLAN
Principal Discharge DX:	Coffee ground emesis  Goal:	resolution of symptoms  Assessment and plan of treatment:	Follow up with Dr Hinojosa in 1 week. Please call [phone]  Diet: regular diet  Activity: encourage normal activity.

## 2018-09-18 NOTE — PROGRESS NOTE ADULT - ASSESSMENT
67M w/ PMHx of necrotizing pancreatitis, with demonstration of yandy-pancreatic fluid collections. Presented on 9/14 with 1 episode of coffee-ground emesis and 3-4 days diffuse abdominal pain.    Plan:  - FU with EGD results  - Encourage OOB and ambulation  - DISPO: DC to rehab tody

## 2018-09-18 NOTE — DISCHARGE NOTE ADULT - HOSPITAL COURSE
67 year-old Cantonese speaking gentleman with pmhx necrotizing pancreatitis, who was sent in from his rehab facility for an episode of coffee-ground emesis. Per his son, the patient reports diffuse abdominal pain over the last 3-4 days prior to admission. The patient first had an episode of pancreatitis about 1.5 years ago, with unclear etiology. He was medically managed, and did well until July of this year, at which time he was admitted to an OSH for another episode of pancreatitis. Per the patient's son, this was found to be necrotizing pancreatitis, and the patient spent at least a few days in the ICU prior to discharge. Since then, the patient has presented to [facility] 2-3 additional times for bouts of abdominal pain similar to those he experienced from pancreatitis. He was noted on multiple scans to have yandy-pancreatic fluid collections, as well as a dilated pancreatic duct and elevated amylase levels, but not recurrent pancreatitis. He was recently referred to Dr. Hinojosa and saw him once in the office as a new patient. 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 He denies any alcohol/drug use. Denies fever/chills, CP, SOB, palpitations, melena, BRBPR, hematuria, back pain. Pt H/H were monitored for anemia. GI recommended EGD before discharge. At the time of discharge, pt vitals were stable, and his lab results were within normal levels.

## 2018-09-18 NOTE — DISCHARGE NOTE ADULT - INSTRUCTIONS
IF unable to tolerate diet nausea, vomiting, fever above 100.3, chills, or an increase in pain, notify provider or return to ER.

## 2018-09-18 NOTE — DISCHARGE NOTE ADULT - ADDITIONAL INSTRUCTIONS
Please follow up with KATHERYN Wilcox,  in 2 weeks (See below for office information to call for an appointment)

## 2018-09-25 PROBLEM — K86.1 OTHER CHRONIC PANCREATITIS: Chronic | Status: ACTIVE | Noted: 2018-09-14

## 2018-09-27 RX ORDER — METRONIDAZOLE 500 MG
1 TABLET ORAL
Qty: 0 | Refills: 0 | COMMUNITY
Start: 2018-09-27

## 2018-09-28 ENCOUNTER — INPATIENT (INPATIENT)
Facility: HOSPITAL | Age: 68
LOS: 12 days | Discharge: HOME | DRG: 438 | End: 2018-10-11
Attending: INTERNAL MEDICINE | Admitting: INTERNAL MEDICINE
Payer: COMMERCIAL

## 2018-09-28 VITALS
HEART RATE: 114 BPM | RESPIRATION RATE: 23 BRPM | TEMPERATURE: 100 F | HEIGHT: 66 IN | DIASTOLIC BLOOD PRESSURE: 66 MMHG | OXYGEN SATURATION: 97 % | WEIGHT: 117.95 LBS | SYSTOLIC BLOOD PRESSURE: 100 MMHG

## 2018-09-28 DIAGNOSIS — R50.9 FEVER, UNSPECIFIED: ICD-10-CM

## 2018-09-28 LAB
ALBUMIN SERPL ELPH-MCNC: 3.2 G/DL — LOW (ref 3.3–5)
ALP SERPL-CCNC: 84 U/L — SIGNIFICANT CHANGE UP (ref 40–120)
ALT FLD-CCNC: 16 U/L — SIGNIFICANT CHANGE UP (ref 10–45)
AMYLASE P1 CFR SERPL: 132 U/L — HIGH (ref 25–125)
ANION GAP SERPL CALC-SCNC: 13 MMOL/L — SIGNIFICANT CHANGE UP (ref 5–17)
ANISOCYTOSIS BLD QL: SLIGHT — SIGNIFICANT CHANGE UP
APPEARANCE UR: CLEAR — SIGNIFICANT CHANGE UP
AST SERPL-CCNC: 11 U/L — SIGNIFICANT CHANGE UP (ref 10–40)
BACTERIA # UR AUTO: 0 — SIGNIFICANT CHANGE UP
BASE EXCESS BLDV CALC-SCNC: -0.1 MMOL/L — SIGNIFICANT CHANGE UP (ref -2–2)
BASO STIPL BLD QL SMEAR: SLIGHT — SIGNIFICANT CHANGE UP
BASOPHILS # BLD AUTO: 0 K/UL — SIGNIFICANT CHANGE UP (ref 0–0.2)
BASOPHILS NFR BLD AUTO: 0.1 % — SIGNIFICANT CHANGE UP (ref 0–2)
BILIRUB SERPL-MCNC: 0.6 MG/DL — SIGNIFICANT CHANGE UP (ref 0.2–1.2)
BILIRUB UR-MCNC: NEGATIVE — SIGNIFICANT CHANGE UP
BUN SERPL-MCNC: 11 MG/DL — SIGNIFICANT CHANGE UP (ref 7–23)
CA-I SERPL-SCNC: 1.11 MMOL/L — LOW (ref 1.12–1.3)
CALCIUM SERPL-MCNC: 8.7 MG/DL — SIGNIFICANT CHANGE UP (ref 8.4–10.5)
CHLORIDE BLDV-SCNC: 105 MMOL/L — SIGNIFICANT CHANGE UP (ref 96–108)
CHLORIDE SERPL-SCNC: 101 MMOL/L — SIGNIFICANT CHANGE UP (ref 96–108)
CO2 BLDV-SCNC: 24 MMOL/L — SIGNIFICANT CHANGE UP (ref 22–30)
CO2 SERPL-SCNC: 19 MMOL/L — LOW (ref 22–31)
COLOR SPEC: SIGNIFICANT CHANGE UP
CREAT SERPL-MCNC: 0.98 MG/DL — SIGNIFICANT CHANGE UP (ref 0.5–1.3)
DIFF PNL FLD: ABNORMAL
EOSINOPHIL # BLD AUTO: 0 K/UL — SIGNIFICANT CHANGE UP (ref 0–0.5)
EOSINOPHIL NFR BLD AUTO: 0.2 % — SIGNIFICANT CHANGE UP (ref 0–6)
EPI CELLS # UR: 0 /HPF — SIGNIFICANT CHANGE UP
GAS PNL BLDV: 129 MMOL/L — LOW (ref 136–145)
GAS PNL BLDV: SIGNIFICANT CHANGE UP
GIANT PLATELETS BLD QL SMEAR: PRESENT — SIGNIFICANT CHANGE UP
GLUCOSE BLDV-MCNC: 106 MG/DL — HIGH (ref 70–99)
GLUCOSE SERPL-MCNC: 102 MG/DL — HIGH (ref 70–99)
GLUCOSE UR QL: NEGATIVE — SIGNIFICANT CHANGE UP
HCO3 BLDV-SCNC: 23 MMOL/L — SIGNIFICANT CHANGE UP (ref 21–29)
HCT VFR BLD CALC: 29.3 % — LOW (ref 39–50)
HCT VFR BLDA CALC: 27 % — LOW (ref 39–50)
HGB BLD CALC-MCNC: 8.8 G/DL — LOW (ref 13–17)
HGB BLD-MCNC: 9.2 G/DL — LOW (ref 13–17)
HYALINE CASTS # UR AUTO: 0 /LPF — SIGNIFICANT CHANGE UP (ref 0–2)
HYPOCHROMIA BLD QL: SLIGHT — SIGNIFICANT CHANGE UP
KETONES UR-MCNC: NEGATIVE — SIGNIFICANT CHANGE UP
LACTATE BLDV-MCNC: 1.4 MMOL/L — SIGNIFICANT CHANGE UP (ref 0.7–2)
LEUKOCYTE ESTERASE UR-ACNC: NEGATIVE — SIGNIFICANT CHANGE UP
LG PLATELETS BLD QL AUTO: SLIGHT — SIGNIFICANT CHANGE UP
LIDOCAIN IGE QN: 64 U/L — HIGH (ref 7–60)
LYMPHOCYTES # BLD AUTO: 1.7 K/UL — SIGNIFICANT CHANGE UP (ref 1–3.3)
LYMPHOCYTES # BLD AUTO: 10.7 % — LOW (ref 13–44)
MACROCYTES BLD QL: SLIGHT — SIGNIFICANT CHANGE UP
MAGNESIUM SERPL-MCNC: 2 MG/DL — SIGNIFICANT CHANGE UP (ref 1.6–2.6)
MCHC RBC-ENTMCNC: 28.2 PG — SIGNIFICANT CHANGE UP (ref 27–34)
MCHC RBC-ENTMCNC: 31.5 GM/DL — LOW (ref 32–36)
MCV RBC AUTO: 89.7 FL — SIGNIFICANT CHANGE UP (ref 80–100)
MICROCYTES BLD QL: SLIGHT — SIGNIFICANT CHANGE UP
MONOCYTES # BLD AUTO: 2 K/UL — HIGH (ref 0–0.9)
MONOCYTES NFR BLD AUTO: 12.9 % — SIGNIFICANT CHANGE UP (ref 2–14)
NEUTROPHILS # BLD AUTO: 11.9 K/UL — HIGH (ref 1.8–7.4)
NEUTROPHILS NFR BLD AUTO: 76.1 % — SIGNIFICANT CHANGE UP (ref 43–77)
NITRITE UR-MCNC: NEGATIVE — SIGNIFICANT CHANGE UP
PCO2 BLDV: 36 MMHG — SIGNIFICANT CHANGE UP (ref 35–50)
PH BLDV: 7.44 — SIGNIFICANT CHANGE UP (ref 7.35–7.45)
PH UR: 6 — SIGNIFICANT CHANGE UP (ref 5–8)
PHOSPHATE SERPL-MCNC: 2.2 MG/DL — LOW (ref 2.5–4.5)
PLAT MORPH BLD: ABNORMAL
PLATELET # BLD AUTO: 917 K/UL — HIGH (ref 150–400)
PO2 BLDV: 50 MMHG — HIGH (ref 25–45)
POIKILOCYTOSIS BLD QL AUTO: SLIGHT — SIGNIFICANT CHANGE UP
POLYCHROMASIA BLD QL SMEAR: SLIGHT — SIGNIFICANT CHANGE UP
POTASSIUM BLDV-SCNC: 3.6 MMOL/L — SIGNIFICANT CHANGE UP (ref 3.5–5.3)
POTASSIUM SERPL-MCNC: 3.9 MMOL/L — SIGNIFICANT CHANGE UP (ref 3.5–5.3)
POTASSIUM SERPL-SCNC: 3.9 MMOL/L — SIGNIFICANT CHANGE UP (ref 3.5–5.3)
PROT SERPL-MCNC: 7 G/DL — SIGNIFICANT CHANGE UP (ref 6–8.3)
PROT UR-MCNC: NEGATIVE — SIGNIFICANT CHANGE UP
RAPID RVP RESULT: SIGNIFICANT CHANGE UP
RBC # BLD: 3.26 M/UL — LOW (ref 4.2–5.8)
RBC # FLD: 16.2 % — HIGH (ref 10.3–14.5)
RBC BLD AUTO: ABNORMAL
RBC CASTS # UR COMP ASSIST: 2 /HPF — SIGNIFICANT CHANGE UP (ref 0–4)
SAO2 % BLDV: 84 % — SIGNIFICANT CHANGE UP (ref 67–88)
SODIUM SERPL-SCNC: 133 MMOL/L — LOW (ref 135–145)
SP GR SPEC: 1.01 — SIGNIFICANT CHANGE UP (ref 1.01–1.02)
TARGETS BLD QL SMEAR: SLIGHT — SIGNIFICANT CHANGE UP
UROBILINOGEN FLD QL: NEGATIVE — SIGNIFICANT CHANGE UP
WBC # BLD: 15.6 K/UL — HIGH (ref 3.8–10.5)
WBC # FLD AUTO: 15.6 K/UL — HIGH (ref 3.8–10.5)
WBC UR QL: 1 /HPF — SIGNIFICANT CHANGE UP (ref 0–5)

## 2018-09-28 PROCEDURE — 93010 ELECTROCARDIOGRAM REPORT: CPT | Mod: NC

## 2018-09-28 PROCEDURE — 99285 EMERGENCY DEPT VISIT HI MDM: CPT | Mod: 25

## 2018-09-28 PROCEDURE — 71045 X-RAY EXAM CHEST 1 VIEW: CPT | Mod: 26

## 2018-09-28 RX ORDER — POTASSIUM PHOSPHATE, MONOBASIC POTASSIUM PHOSPHATE, DIBASIC 236; 224 MG/ML; MG/ML
15 INJECTION, SOLUTION INTRAVENOUS ONCE
Qty: 0 | Refills: 0 | Status: COMPLETED | OUTPATIENT
Start: 2018-09-28 | End: 2018-09-28

## 2018-09-28 RX ORDER — PIPERACILLIN AND TAZOBACTAM 4; .5 G/20ML; G/20ML
3.38 INJECTION, POWDER, LYOPHILIZED, FOR SOLUTION INTRAVENOUS EVERY 8 HOURS
Qty: 0 | Refills: 0 | Status: DISCONTINUED | OUTPATIENT
Start: 2018-09-28 | End: 2018-10-02

## 2018-09-28 RX ORDER — PIPERACILLIN AND TAZOBACTAM 4; .5 G/20ML; G/20ML
3.38 INJECTION, POWDER, LYOPHILIZED, FOR SOLUTION INTRAVENOUS ONCE
Qty: 0 | Refills: 0 | Status: COMPLETED | OUTPATIENT
Start: 2018-09-28 | End: 2018-09-28

## 2018-09-28 RX ORDER — SODIUM CHLORIDE 9 MG/ML
1000 INJECTION, SOLUTION INTRAVENOUS
Qty: 0 | Refills: 0 | Status: DISCONTINUED | OUTPATIENT
Start: 2018-09-28 | End: 2018-10-04

## 2018-09-28 RX ORDER — SODIUM CHLORIDE 9 MG/ML
1000 INJECTION INTRAMUSCULAR; INTRAVENOUS; SUBCUTANEOUS
Qty: 0 | Refills: 0 | Status: DISCONTINUED | OUTPATIENT
Start: 2018-09-28 | End: 2018-10-04

## 2018-09-28 RX ORDER — INFLUENZA VIRUS VACCINE 15; 15; 15; 15 UG/.5ML; UG/.5ML; UG/.5ML; UG/.5ML
0.5 SUSPENSION INTRAMUSCULAR ONCE
Qty: 0 | Refills: 0 | Status: DISCONTINUED | OUTPATIENT
Start: 2018-09-28 | End: 2018-10-11

## 2018-09-28 RX ORDER — HEPARIN SODIUM 5000 [USP'U]/ML
5000 INJECTION INTRAVENOUS; SUBCUTANEOUS EVERY 12 HOURS
Qty: 0 | Refills: 0 | Status: DISCONTINUED | OUTPATIENT
Start: 2018-09-28 | End: 2018-10-11

## 2018-09-28 RX ORDER — SODIUM CHLORIDE 9 MG/ML
2000 INJECTION, SOLUTION INTRAVENOUS ONCE
Qty: 0 | Refills: 0 | Status: COMPLETED | OUTPATIENT
Start: 2018-09-28 | End: 2018-09-28

## 2018-09-28 RX ORDER — SODIUM CHLORIDE 9 MG/ML
1000 INJECTION INTRAMUSCULAR; INTRAVENOUS; SUBCUTANEOUS ONCE
Qty: 0 | Refills: 0 | Status: COMPLETED | OUTPATIENT
Start: 2018-09-28 | End: 2018-09-28

## 2018-09-28 RX ORDER — ACETAMINOPHEN 500 MG
975 TABLET ORAL ONCE
Qty: 0 | Refills: 0 | Status: COMPLETED | OUTPATIENT
Start: 2018-09-28 | End: 2018-09-28

## 2018-09-28 RX ORDER — PANTOPRAZOLE SODIUM 20 MG/1
40 TABLET, DELAYED RELEASE ORAL
Qty: 0 | Refills: 0 | Status: DISCONTINUED | OUTPATIENT
Start: 2018-09-28 | End: 2018-10-11

## 2018-09-28 RX ORDER — VANCOMYCIN HCL 1 G
1000 VIAL (EA) INTRAVENOUS ONCE
Qty: 0 | Refills: 0 | Status: COMPLETED | OUTPATIENT
Start: 2018-09-28 | End: 2018-09-28

## 2018-09-28 RX ADMIN — Medication 250 MILLIGRAM(S): at 18:46

## 2018-09-28 RX ADMIN — Medication 975 MILLIGRAM(S): at 16:22

## 2018-09-28 RX ADMIN — SODIUM CHLORIDE 2000 MILLILITER(S): 9 INJECTION, SOLUTION INTRAVENOUS at 16:22

## 2018-09-28 RX ADMIN — PIPERACILLIN AND TAZOBACTAM 3.38 GRAM(S): 4; .5 INJECTION, POWDER, LYOPHILIZED, FOR SOLUTION INTRAVENOUS at 17:33

## 2018-09-28 RX ADMIN — PIPERACILLIN AND TAZOBACTAM 25 GRAM(S): 4; .5 INJECTION, POWDER, LYOPHILIZED, FOR SOLUTION INTRAVENOUS at 22:17

## 2018-09-28 RX ADMIN — SODIUM CHLORIDE 150 MILLILITER(S): 9 INJECTION INTRAMUSCULAR; INTRAVENOUS; SUBCUTANEOUS at 23:30

## 2018-09-28 RX ADMIN — PIPERACILLIN AND TAZOBACTAM 200 GRAM(S): 4; .5 INJECTION, POWDER, LYOPHILIZED, FOR SOLUTION INTRAVENOUS at 16:22

## 2018-09-28 RX ADMIN — Medication 975 MILLIGRAM(S): at 17:33

## 2018-09-28 RX ADMIN — SODIUM CHLORIDE 1000 MILLILITER(S): 9 INJECTION INTRAMUSCULAR; INTRAVENOUS; SUBCUTANEOUS at 21:15

## 2018-09-28 NOTE — H&P ADULT - NSHPLABSRESULTS_GEN_ALL_CORE
9.2    15.6  )-----------( 917      ( 28 Sep 2018 16:33 )             29.3       09-28    133<L>  |  101  |  11  ----------------------------<  102<H>  3.9   |  19<L>  |  0.98    Ca    8.7      28 Sep 2018 16:33  Phos  2.2     09-28  Mg     2.0     09-28    TPro  7.0  /  Alb  3.2<L>  /  TBili  0.6  /  DBili  x   /  AST  11  /  ALT  16  /  AlkPhos  84  09-28                      Lactate Trend            CAPILLARY BLOOD GLUCOSE

## 2018-09-28 NOTE — ED CLERICAL - DIVISION
[facility]... Breath Sounds equal & clear to percussion & auscultation, no accessory muscle use

## 2018-09-28 NOTE — ED PROVIDER NOTE - PROGRESS NOTE DETAILS
Per son, patient had 6 CT scans in the past and is afraid of increased exposure of radiation. Patient declines CT scan this time. Understands the risk and benefits of the scan. Per son, patient had 6 CT scans in the past and is afraid of increased exposure of radiation. Patient declines CT scan this time. Explains and understands the risk and benefits of the scan. Dr. Justina Dukes Brenda Zaidi, DO: Patient seen and evaluated at bedside. GI present. Recommending MRI abd with contrast. PE consistent with Dr. Armando. Will admit for fever and MRI. Pt and family aware MRI not to be performed in ED. Patient covered with abx to cover for sepsis. Dr. Baker aware of admission.

## 2018-09-28 NOTE — ED ADULT TRIAGE NOTE - CHIEF COMPLAINT QUOTE
Pt was admitted last week for vomiting blood. Dr. Wilcox told to pt to come in today for fever of 102 for the past two days.

## 2018-09-28 NOTE — H&P ADULT - HISTORY OF PRESENT ILLNESS
68 yo male Cantonese speaking Male   with PMHx of chronic pancreatitis who has had multiple bouts of pancreatitis this year now presenting with  fever of 102 since yesterday     Patient has been seen at [facility] for  his pancreatitis and workup there has been non-revealing as to the cause  . There were reportedly no gallstones and no ETOH use.   He came to [facility] 2 weeks ago   for hematemesis/melena – EGD was done and there was  duodenitis, and bleeding resolved.    Since leaving the hospital,  he developed fevers up to 102 – he went to urgent care and had an unremarkable RUQ and labs except for WBC of 13K, but is still having fevers of 102 so  sent him back in.to hospital   Denies any abdominal pain, diarrhea, melena, BRBpR cough, SOB, dysuria.   chart as per gi  on imaging there has been multiple cystic lesions in the pancreas for which apparently FNA with sampling was performed (per son it was unremarkable, ) He also had these lesions in the peritoneum they read as possible peritoneal carcinomatosis, but per the summary there they think it is infectious in nature, sent him home back in August with a few weeks of IV antibiotics and were going to repeat imaging  .

## 2018-09-28 NOTE — H&P ADULT - ASSESSMENT
pt w hx recurrent pancreatitis w/ fever /leukocytosis /r/o sepsis  mr abd  u.s abd r/o pv thrombus  clears  iv fluids  dvt proph  ppis  gi eval  id eval  f/u cultures  cont abs

## 2018-09-28 NOTE — CONSULT NOTE ADULT - ASSESSMENT
66 yo Donaldoe speaking M  with PMHx of chronic pancreatitis who has had multiple bouts of pancreatitis this year now presenting with abdominal pain and fever of 102.    1)Sepsis  Patient presenting with fever of 100.4 and WBC of 15.  2) Hx of recurrent pancreatitis  3) Peritoneal carcinomatosis ??  Suspicious findings on OSH imaging    - please complete infectious workup (Bcx, UA, Ucx)  - please check LFTs  - please obtain repeat CT and MRI abdomen to evaluate to possible peritoneal carcinomatosis  - please check WBC and fever daily 66 yo Donaldoe speaking M  with PMHx of chronic pancreatitis who has had multiple bouts of pancreatitis this year now presenting with abdominal pain and fever of 102.    1)Sepsis  Patient presenting with fever of 100.4 and WBC of 15.  2) Hx of recurrent pancreatitis  3) Peritoneal carcinomatosis ??  Suspicious findings on OSH imaging    - please complete infectious workup (Bcx, UA, Ucx)  - please check LFTs  - please obtain repeat CT and/or MRI abdomen to evaluate to possible peritoneal carcinomatosis  - please check WBC and fever daily

## 2018-09-28 NOTE — ED PROVIDER NOTE - MEDICAL DECISION MAKING DETAILS
Dr. Armando Note: recurrent fever of unknown origin, will likely require admission for further workup; pt and family refusing ct scan, for inpt mri abdomen likely

## 2018-09-28 NOTE — CONSULT NOTE ADULT - SUBJECTIVE AND OBJECTIVE BOX
Chief Complaint:  Patient is a 67y old  Male who presents with a chief complaint of     HPI:  66 yo Cantonese speaking M  with PMHx of chronic pancreatitis who has had multiple bouts of pancreatitis this year now presenting with abdominal pain and fever of 102.    Patient has been seen at [facility] for  his pancreatitis and workup there has been non-revealing as to the cause. There were reportedly no gallstones and no ETOH use. He came to [place] 1.5 weeks ago for hematemesis/melena – we did an EGD and there was only some duodenitis, and bleeding resolved. We asked for records, but they never got it to us before discharge.    Since leaving the hospital, he was fine until yesterday when he developed fevers up to 102 – he went to urgent care and had an unremarkable RUQ and labs except for WBC of 13K, but is still having fevers of 102 so I sent him back in. Apparently his son was able to get some records from [facility] on [employer]. Based on [employer] chart, on imaging there has been multiple cystic lesions in the pancreas for which apparently FNA with sampling was performed (per son it was unremarkable, but the actual report is not available.) He also had these lesions in the peritoneum they read as possible peritoneal carcinomatosis, but per the summary there they think it is infectious in nature, sent him home back in August with a few weeks of IV antibiotics and were going to repeat imaging which he has not yet gotten. The patient and son does not quite know what was done or what the people at [facility] thought about the peritoneal lesions. Course at [facility] was also complicated by portal vein thrombosis for which he’s on anticoagulation.    In the ED, Febrile to 100.4 SBP of 80-100s. WBC of 15. Hb stable at 9. Plts 917.    EGD 9/2018  - Normal upper third of esophagus and middle third of esophagus.                       - Mildly severe esophagitis.                       - Normal stomach.                       - Duodenitis. Likely the cause of patient's melena.                       - No specimens collected.    Allergies:  No Known Allergies      Home Medications:    Hospital Medications:  lactated ringers. 1000 milliLiter(s) IV Continuous <Continuous>      PMHX/PSHX:  Chronic pancreatitis, unspecified pancreatitis type  No significant past surgical history      Family history:  No pertinent family history in first degree relatives      Social History:     ROS:     General:  No wt loss, fevers, chills, night sweats, fatigue,   Eyes:  Good vision, no reported pain  ENT:  No sore throat, pain, runny nose, dysphagia  CV:  No pain, palpitations, hypo/hypertension  Resp:  No dyspnea, cough, tachypnea, wheezing  GI:  See HPI  :  No pain, bleeding, incontinence, nocturia  Muscle:  No pain, weakness  Neuro:  No weakness, tingling, memory problems  Psych:  No fatigue, insomnia, mood problems, depression  Endocrine:  No polyuria, polydipsia, cold/heat intolerance  Heme:  No petechiae, ecchymosis, easy bruisability  Skin:  No rash, edema      PHYSICAL EXAM:     GENERAL:  Appears stated age, well-groomed, well-nourished, no distress  HEENT:  NC/AT,  conjunctivae clear and pink,  no JVD  CHEST:  Full & symmetric excursion, no increased effort, breath sounds clear  HEART:  Regular rhythm, S1, S2, no murmur/rub/S3/S4, no abdominal bruit, no edema  ABDOMEN:  Soft, non-tender, non-distended, normoactive bowel sounds,  no masses ,  EXTREMITIES:  no cyanosis,clubbing or edema  SKIN:  No rash/erythema/ecchymoses/petechiae/wounds/abscess/warm/dry  NEURO:  Alert, oriented    Vital Signs:  Vital Signs Last 24 Hrs  T(C): 37.6 (28 Sep 2018 15:56), Max: 38 (28 Sep 2018 14:42)  T(F): 99.6 (28 Sep 2018 15:56), Max: 100.4 (28 Sep 2018 14:42)  HR: 94 (28 Sep 2018 15:56) (94 - 114)  BP: 89/68 (28 Sep 2018 15:56) (89/68 - 100/66)  BP(mean): --  RR: 21 (28 Sep 2018 15:56) (20 - 23)  SpO2: 98% (28 Sep 2018 15:56) (97% - 98%)  Daily Height in cm: 167.64 (28 Sep 2018 14:42)    Daily     LABS:                    Imaging: Chief Complaint:  Patient is a 67y old  Male who presents with a chief complaint of     HPI:  66 yo Cantonese speaking M  with PMHx of chronic pancreatitis who has had multiple bouts of pancreatitis this year now presenting with  fever of 102 since thursday.    Patient has been seen at [facility] for  his pancreatitis and workup there has been non-revealing as to the cause. There were reportedly no gallstones and no ETOH use. He came to [place] 1.5 weeks ago for hematemesis/melena – we did an EGD and there was only some duodenitis, and bleeding resolved. We asked for records, but they never got it to us before discharge.    Since leaving the hospital, he was fine until Thursday when he developed fevers up to 102 – he went to urgent care and had an unremarkable RUQ and labs except for WBC of 13K, but is still having fevers of 102 so I sent him back in. Denies any abdominal pain, diarrhea, melena, BRBpR cough, SOB, dysuria. Apparently his son was able to get some records from [facility] on [employer]. 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 The patient and son does not quite know what was done or what the people at [facility] thought about the peritoneal lesions. Course at [facility] was also complicated by portal vein thrombosis for which he’s on anticoagulation.    In the ED, Febrile to 100.4 SBP of 80-100s. WBC of 15. Hb stable at 9. Plts 917.    EGD 9/2018  - Normal upper third of esophagus and middle third of esophagus.                       - Mildly severe esophagitis.                       - Normal stomach.                       - Duodenitis. Likely the cause of patient's melena.                       - No specimens collected.    Allergies:  No Known Allergies      Home Medications:    Hospital Medications:  lactated ringers. 1000 milliLiter(s) IV Continuous <Continuous>      PMHX/PSHX:  Chronic pancreatitis, unspecified pancreatitis type  No significant past surgical history      Family history:  No pertinent family history in first degree relatives      Social History:     ROS:     General:  No wt loss, fevers, chills, night sweats, fatigue,   Eyes:  Good vision, no reported pain  ENT:  No sore throat, pain, runny nose, dysphagia  CV:  No pain, palpitations, hypo/hypertension  Resp:  No dyspnea, cough, tachypnea, wheezing  GI:  See HPI  :  No pain, bleeding, incontinence, nocturia  Muscle:  No pain, weakness  Neuro:  No weakness, tingling, memory problems  Psych:  No fatigue, insomnia, mood problems, depression  Endocrine:  No polyuria, polydipsia, cold/heat intolerance  Heme:  No petechiae, ecchymosis, easy bruisability  Skin:  No rash, edema      PHYSICAL EXAM:     GENERAL:  Appears stated age, well-groomed, well-nourished, no distress  HEENT:  NC/AT,  conjunctivae clear and pink,  no JVD  CHEST:  Full & symmetric excursion, no increased effort, breath sounds clear  HEART:  Regular rhythm, S1, S2, no murmur/rub/S3/S4, no abdominal bruit, no edema  ABDOMEN:  Soft, non-tender, non-distended, normoactive bowel sounds,  no masses ,  EXTREMITIES:  no cyanosis,clubbing or edema  SKIN:  No rash/erythema/ecchymoses/petechiae/wounds/abscess/warm/dry  NEURO:  Alert, oriented    Vital Signs:  Vital Signs Last 24 Hrs  T(C): 37.6 (28 Sep 2018 15:56), Max: 38 (28 Sep 2018 14:42)  T(F): 99.6 (28 Sep 2018 15:56), Max: 100.4 (28 Sep 2018 14:42)  HR: 94 (28 Sep 2018 15:56) (94 - 114)  BP: 89/68 (28 Sep 2018 15:56) (89/68 - 100/66)  BP(mean): --  RR: 21 (28 Sep 2018 15:56) (20 - 23)  SpO2: 98% (28 Sep 2018 15:56) (97% - 98%)  Daily Height in cm: 167.64 (28 Sep 2018 14:42)    Daily     LABS:                    Imaging:

## 2018-09-28 NOTE — ED ADULT NURSE NOTE - OBJECTIVE STATEMENT
68 yo male with PMH of chronic pancreatitis, recently admitted to [place] on 9/18, presents to ED with fever since yesterday, associated with generalized weakness. No n/v/d, no abdominal pain. Son contacted his GI and requested patient to get an MRI. Patient also states 30lbs weight loss over the last 2 months.

## 2018-09-28 NOTE — ED ADULT NURSE NOTE - EENT WDL
How Severe Is It?: moderate
Is This A New Presentation, Or A Follow-Up?: Follow Up Isotretinoin
Eyes with no visual disturbances.  Ears clean and dry and no hearing difficulties. Nose with pink mucosa and no drainage.  Mouth mucous membranes moist and pink.  No tenderness or swelling to throat or neck.

## 2018-09-28 NOTE — ED PROVIDER NOTE - OBJECTIVE STATEMENT
67 year old male with pmhx of chronic pancreatitis presents with 2 weeks ago after vomiting, coughing in ICU on antibiotics for pancreatic pain. Was discharge on Sept 18th. Patient felt fine and had normal appetite and was gaining back his strength. Yesterday spiked a fever of 102.4 associated with generalized weakness with leg pain. Denies any nausea or vomiting Denies any abdominal pain. No other complaints. Contacted his GI and requested patient to get an MRI. Went to  where white count was 13. Patient also states 30lbs weight loss. Normal bowel movements.  GI- [place] [phone]

## 2018-09-29 LAB
ALBUMIN SERPL ELPH-MCNC: 2.5 G/DL — LOW (ref 3.3–5)
ALP SERPL-CCNC: 73 U/L — SIGNIFICANT CHANGE UP (ref 40–120)
ALT FLD-CCNC: 15 U/L — SIGNIFICANT CHANGE UP (ref 10–45)
ANION GAP SERPL CALC-SCNC: 10 MMOL/L — SIGNIFICANT CHANGE UP (ref 5–17)
AST SERPL-CCNC: 11 U/L — SIGNIFICANT CHANGE UP (ref 10–40)
BILIRUB SERPL-MCNC: 0.4 MG/DL — SIGNIFICANT CHANGE UP (ref 0.2–1.2)
BUN SERPL-MCNC: 7 MG/DL — SIGNIFICANT CHANGE UP (ref 7–23)
CALCIUM SERPL-MCNC: 7.8 MG/DL — LOW (ref 8.4–10.5)
CHLORIDE SERPL-SCNC: 108 MMOL/L — SIGNIFICANT CHANGE UP (ref 96–108)
CO2 SERPL-SCNC: 20 MMOL/L — LOW (ref 22–31)
CREAT SERPL-MCNC: 0.79 MG/DL — SIGNIFICANT CHANGE UP (ref 0.5–1.3)
CULTURE RESULTS: NO GROWTH — SIGNIFICANT CHANGE UP
GLUCOSE SERPL-MCNC: 93 MG/DL — SIGNIFICANT CHANGE UP (ref 70–99)
HCT VFR BLD CALC: 24.2 % — LOW (ref 39–50)
HGB BLD-MCNC: 7.5 G/DL — LOW (ref 13–17)
MCHC RBC-ENTMCNC: 27.7 PG — SIGNIFICANT CHANGE UP (ref 27–34)
MCHC RBC-ENTMCNC: 31 GM/DL — LOW (ref 32–36)
MCV RBC AUTO: 89.3 FL — SIGNIFICANT CHANGE UP (ref 80–100)
PHOSPHATE SERPL-MCNC: 2.6 MG/DL — SIGNIFICANT CHANGE UP (ref 2.5–4.5)
PLATELET # BLD AUTO: 738 K/UL — HIGH (ref 150–400)
POTASSIUM SERPL-MCNC: 3.9 MMOL/L — SIGNIFICANT CHANGE UP (ref 3.5–5.3)
POTASSIUM SERPL-SCNC: 3.9 MMOL/L — SIGNIFICANT CHANGE UP (ref 3.5–5.3)
PROT SERPL-MCNC: 5.6 G/DL — LOW (ref 6–8.3)
RBC # BLD: 2.71 M/UL — LOW (ref 4.2–5.8)
RBC # FLD: 17 % — HIGH (ref 10.3–14.5)
SODIUM SERPL-SCNC: 138 MMOL/L — SIGNIFICANT CHANGE UP (ref 135–145)
SPECIMEN SOURCE: SIGNIFICANT CHANGE UP
WBC # BLD: 11.36 K/UL — HIGH (ref 3.8–10.5)
WBC # FLD AUTO: 11.36 K/UL — HIGH (ref 3.8–10.5)

## 2018-09-29 PROCEDURE — 99254 IP/OBS CNSLTJ NEW/EST MOD 60: CPT | Mod: GC

## 2018-09-29 RX ORDER — ACETAMINOPHEN 500 MG
650 TABLET ORAL ONCE
Qty: 0 | Refills: 0 | Status: COMPLETED | OUTPATIENT
Start: 2018-09-29 | End: 2018-09-29

## 2018-09-29 RX ADMIN — HEPARIN SODIUM 5000 UNIT(S): 5000 INJECTION INTRAVENOUS; SUBCUTANEOUS at 19:03

## 2018-09-29 RX ADMIN — POTASSIUM PHOSPHATE, MONOBASIC POTASSIUM PHOSPHATE, DIBASIC 62.5 MILLIMOLE(S): 236; 224 INJECTION, SOLUTION INTRAVENOUS at 01:29

## 2018-09-29 RX ADMIN — PIPERACILLIN AND TAZOBACTAM 25 GRAM(S): 4; .5 INJECTION, POWDER, LYOPHILIZED, FOR SOLUTION INTRAVENOUS at 13:38

## 2018-09-29 RX ADMIN — Medication 650 MILLIGRAM(S): at 06:18

## 2018-09-29 RX ADMIN — PIPERACILLIN AND TAZOBACTAM 25 GRAM(S): 4; .5 INJECTION, POWDER, LYOPHILIZED, FOR SOLUTION INTRAVENOUS at 06:11

## 2018-09-29 RX ADMIN — HEPARIN SODIUM 5000 UNIT(S): 5000 INJECTION INTRAVENOUS; SUBCUTANEOUS at 06:12

## 2018-09-29 RX ADMIN — PIPERACILLIN AND TAZOBACTAM 25 GRAM(S): 4; .5 INJECTION, POWDER, LYOPHILIZED, FOR SOLUTION INTRAVENOUS at 23:03

## 2018-09-29 RX ADMIN — PANTOPRAZOLE SODIUM 40 MILLIGRAM(S): 20 TABLET, DELAYED RELEASE ORAL at 06:12

## 2018-09-29 NOTE — CONSULT NOTE ADULT - SUBJECTIVE AND OBJECTIVE BOX
HPI:    67 year old Cantonese speaking male with PMH chronic pancreatitis who presented to [facility] on  with fever of 102 since the day prior to admission. Of note, patient was seen at [facility] for his pancreatitis and workup there has been non-revealing as to the cause. Reportedly patient did not have gallstones and he has not had EtOH use. Also, was seen in [facility] ~ 2 weeks ago for hematemesis and melena. EGD was done which revealed duodenitis. Patient     . There were reportedly no gallstones and no ETOH use.   He came to [facility] 2 weeks ago   for hematemesis/melena – EGD was done and there was  duodenitis, and bleeding resolved.    Since leaving the hospital,  he developed fevers up to 102 – he went to urgent care and had an unremarkable RUQ and labs except for WBC of 13K, but is still having fevers of 102 so  sent him back in.to hospital   Denies any abdominal pain, diarrhea, melena, BRBpR cough, SOB, dysuria.   chart as per gi  on imaging there has been multiple cystic lesions in the pancreas for which apparently FNA with sampling was performed (per son it was unremarkable, ) He also had these lesions in the peritoneum they read as possible peritoneal carcinomatosis, but per the summary there they think it is infectious in nature, sent him home back in August with a few weeks of IV antibiotics and were going to repeat imaging  . (28 Sep 2018 18:43)      PAST MEDICAL & SURGICAL HISTORY:  Chronic pancreatitis, unspecified pancreatitis type  No significant past surgical history      Allergies  No Known Allergies        ANTIMICROBIALS:  piperacillin/tazobactam IVPB. 3.375 every 8 hours      OTHER MEDS: MEDICATIONS  (STANDING):  heparin  Injectable 5000 every 12 hours  influenza   Vaccine 0.5 once  pantoprazole    Tablet 40 before breakfast      SOCIAL HISTORY:  [ ] etoh [ ] tobacco [ ] former smoker [ ] IVDU    FAMILY HISTORY:  No pertinent family history in first degree relatives      REVIEW OF SYSTEMS  [  ] ROS unobtainable because:    [  ] All other systems negative except as noted below:	    Constitutional:  [ ] fever [ ] chills  [ ] weight loss  [ ] weakness  Skin:  [ ] rash [ ] phlebitis	  Eyes: [ ] icterus [ ] pain  [ ] discharge	  ENMT: [ ] sore throat  [ ] thrush [ ] ulcers [ ] exudates  Respiratory: [ ] dyspnea [ ] hemoptysis [ ] cough [ ] sputum	  Cardiovascular:  [ ] chest pain [ ] palpitations [ ] edema	  Gastrointestinal:  [ ] nausea [ ] vomiting [ ] diarrhea [ ] constipation [ ] pain	  Genitourinary:  [ ] dysuria [ ] frequency [ ] hematuria [ ] discharge [ ] flank pain  [ ] incontinence  Musculoskeletal:  [ ] myalgias [ ] arthralgias [ ] arthritis  [ ] back pain  Neurological:  [ ] headache [ ] seizures  [ ] confusion/altered mental status  Psychiatric:  [ ] anxiety [ ] depression	  Hematology/Lymphatics:  [ ] lymphadenopathy  Endocrine:  [ ] adrenal [ ] thyroid  Allergic/Immunologic:	 [ ] transplant [ ] seasonal    Vital Signs Last 24 Hrs  T(F): 101 (18 @ 06:05), Max: 101 (18 @ 06:05)    Vital Signs Last 24 Hrs  HR: 93 (18 @ 06:05) (75 - 114)  BP: 91/59 (18 @ 06:05) (78/46 - 100/66)  RR: 18 (18 @ 06:05)  SpO2: 96% (18 @ 06:05) (96% - 100%)  Wt(kg): --    PHYSICAL EXAM:  General: non-toxic  HEAD/EYES: anicteric, PERRL  ENT:  supple  Cardiovascular:   S1, S2  Respiratory:  clear bilaterally  GI:  soft, non-tender, normal bowel sounds  :  no CVA tenderness   Musculoskeletal:  no synovitis  Neurologic:  grossly non-focal  Skin:  no rash  Lymph: no lymphadenopathy  Psychiatric:  appropriate affect  Vascular:  no phlebitis                                9.2    15.6  )-----------( 917      ( 28 Sep 2018 16:33 )             29.3           138  |  108  |  7   ----------------------------<  93  3.9   |  20<L>  |  0.79    Ca    7.8<L>      29 Sep 2018 06:27  Phos  2.6       Mg     2.0         TPro  5.6<L>  /  Alb  2.5<L>  /  TBili  0.4  /  DBili  x   /  AST  11  /  ALT  15  /  AlkPhos  73        Urinalysis Basic - ( 28 Sep 2018 18:36 )    Color: Light Yellow / Appearance: Clear / S.010 / pH: x  Gluc: x / Ketone: Negative  / Bili: Negative / Urobili: Negative   Blood: x / Protein: Negative / Nitrite: Negative   Leuk Esterase: Negative / RBC: 2 /hpf / WBC 1 /hpf   Sq Epi: x / Non Sq Epi: 0 /hpf / Bacteria: 0.0        MICROBIOLOGY:          v    Rapid RVP Result: NotDetec ( @ 16:33)          RADIOLOGY: HPI:    67 year old Cantonese speaking male with PMH chronic pancreatitis who presented to [facility] on  with fever of 102 since the day prior to admission. Recent admission to [facility] 18 - 18 for hematemesis and melena and EGD on 18 with duodenitis. Patient placed on Protonix 40 mg PO BID and discharged back to rehab. Since discharge from [facility] patient developed fevers to Tmax of 102. Patient presented to urgent care and had unremarkable physical exam and labwork significant for WBC of 13. Patient was still having fevers after the visit so patient was referred for admission. patient denied abdominal pain, N/V/D. He denies cough, shortness of breath, sore throat or rhinorrhea. No dysuria, urinary frequency or hesitancy.     Of note in July of this year, patient was admitted to OSH for episode of pancreatitis. Found to have necrotizing pancreatitis, and the patient spent at least a few days in the ICU prior to discharge. Since then, the patient has presented to [facility] 2-3 additional times for bouts of abdominal pain similar to those he experienced from pancreatitis. He was noted on multiple scans to have yandy-pancreatic fluid collections, as well as a dilated pancreatic duct and elevated amylase levels, but not recurrent pancreatitis (reportedly patient has had these sampled with FNA with unremarkable results). He was recently referred to Dr. Hinojosa and saw him once in the office as a new patient. 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 Of note, he has also been noted to have lesions in the peritoneum which were read as possible peritoneal carcinomatosis but per OSH GI summary were felt ti be infectious in nature. Patient sent home in August with a few weeks of IV antibiotics with plans for repeat imaging.     In the ED febrile to 100.4, tachycardic to > 110, hypotensive to SBP in the 80's. WBC on presentation of 15.6 with 76.1% PMN. Lipase of 64, amylase of 132. Lactic acid of 1.4. U/A with 1 WBC. RVP negative. CXR with Possible trace left pleural fluid. In the ED received a dose of Vancomycin/Zosyn and continued on Zosyn. Last febrile to 101 on A.M. of consult.     PAST MEDICAL & SURGICAL HISTORY:  Chronic pancreatitis, unspecified pancreatitis type  No significant past surgical history      Allergies  No Known Allergies        ANTIMICROBIALS:  piperacillin/tazobactam IVPB. 3.375 every 8 hours      OTHER MEDS: MEDICATIONS  (STANDING):  heparin  Injectable 5000 every 12 hours  influenza   Vaccine 0.5 once  pantoprazole    Tablet 40 before breakfast      SOCIAL HISTORY:  [ ] etoh [ ] tobacco [ ] former smoker [ ] IVDU    FAMILY HISTORY:  No pertinent family history in first degree relatives      REVIEW OF SYSTEMS  [  ] ROS unobtainable because:    [  ] All other systems negative except as noted below:	    Constitutional:  [ ] fever [ ] chills  [ ] weight loss  [ ] weakness  Skin:  [ ] rash [ ] phlebitis	  Eyes: [ ] icterus [ ] pain  [ ] discharge	  ENMT: [ ] sore throat  [ ] thrush [ ] ulcers [ ] exudates  Respiratory: [ ] dyspnea [ ] hemoptysis [ ] cough [ ] sputum	  Cardiovascular:  [ ] chest pain [ ] palpitations [ ] edema	  Gastrointestinal:  [ ] nausea [ ] vomiting [ ] diarrhea [ ] constipation [ ] pain	  Genitourinary:  [ ] dysuria [ ] frequency [ ] hematuria [ ] discharge [ ] flank pain  [ ] incontinence  Musculoskeletal:  [ ] myalgias [ ] arthralgias [ ] arthritis  [ ] back pain  Neurological:  [ ] headache [ ] seizures  [ ] confusion/altered mental status  Psychiatric:  [ ] anxiety [ ] depression	  Hematology/Lymphatics:  [ ] lymphadenopathy  Endocrine:  [ ] adrenal [ ] thyroid  Allergic/Immunologic:	 [ ] transplant [ ] seasonal    Vital Signs Last 24 Hrs  T(F): 101 (18 @ 06:05), Max: 101 (18 @ 06:05)    Vital Signs Last 24 Hrs  HR: 93 (18 @ 06:05) (75 - 114)  BP: 91/59 (18 @ 06:05) (78/46 - 100/66)  RR: 18 (18 @ 06:05)  SpO2: 96% (18 @ 06:05) (96% - 100%)  Wt(kg): --    PHYSICAL EXAM:  General: non-toxic  HEAD/EYES: anicteric, PERRL  ENT:  supple  Cardiovascular:   S1, S2  Respiratory:  clear bilaterally  GI:  soft, non-tender, normal bowel sounds  :  no CVA tenderness   Musculoskeletal:  no synovitis  Neurologic:  grossly non-focal  Skin:  no rash  Lymph: no lymphadenopathy  Psychiatric:  appropriate affect  Vascular:  no phlebitis                                9.2    15.6  )-----------( 917      ( 28 Sep 2018 16:33 )             29.3           138  |  108  |  7   ----------------------------<  93  3.9   |  20<L>  |  0.79    Ca    7.8<L>      29 Sep 2018 06:27  Phos  2.6       Mg     2.0         TPro  5.6<L>  /  Alb  2.5<L>  /  TBili  0.4  /  DBili  x   /  AST  11  /  ALT  15  /  AlkPhos  73        Urinalysis Basic - ( 28 Sep 2018 18:36 )    Color: Light Yellow / Appearance: Clear / S.010 / pH: x  Gluc: x / Ketone: Negative  / Bili: Negative / Urobili: Negative   Blood: x / Protein: Negative / Nitrite: Negative   Leuk Esterase: Negative / RBC: 2 /hpf / WBC 1 /hpf   Sq Epi: x / Non Sq Epi: 0 /hpf / Bacteria: 0.0        MICROBIOLOGY:    Rapid RVP Result: Cristina ( @ 16:33)    RADIOLOGY:  EXAM:  XR CHEST PORTABLE ROUTINE 1V                        PROCEDURE DATE:  2018    Possible trace left pleural fluid. HPI:    67 year old Cantonese speaking male with PMH chronic pancreatitis who presented to [facility] on  with fever of 102 since the day prior to admission. Recent admission to [facility] 18 - 18 for hematemesis and melena and EGD on 18 with duodenitis. Patient placed on Protonix 40 mg PO BID and discharged back to rehab. Since discharge from [facility] patient developed fevers to Tmax of 102. Patient presented to urgent care and had unremarkable physical exam and labwork significant for WBC of 13. Patient was still having fevers after the visit so patient was referred for admission. patient denied abdominal pain, N/V/D. He denies cough, shortness of breath, sore throat or rhinorrhea. No dysuria, urinary frequency or hesitancy.     Of note in July of this year, patient was admitted to OSH for episode of pancreatitis. Found to have necrotizing pancreatitis, and the patient spent at least a few days in the ICU prior to discharge. Since then, the patient has presented to [facility] 2-3 additional times for bouts of abdominal pain similar to those he experienced from pancreatitis. He was noted on multiple scans to have yandy-pancreatic fluid collections, as well as a dilated pancreatic duct and elevated amylase levels, but not recurrent pancreatitis (reportedly patient has had these sampled with FNA with unremarkable results). He was recently referred to Dr. Hinojosa and saw him once in the office as a new patient. 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 Of note, he has also been noted to have lesions in the peritoneum which were read as possible peritoneal carcinomatosis but per OSH GI summary were felt ti be infectious in nature. Patient sent home in August with a few weeks of IV antibiotics with plans for repeat imaging.     In the ED febrile to 100.4, tachycardic to > 110, hypotensive to SBP in the 80's. WBC on presentation of 15.6 with 76.1% PMN. Lipase of 64, amylase of 132. Lactic acid of 1.4. U/A with 1 WBC. RVP negative. CXR with Possible trace left pleural fluid. In the ED received a dose of Vancomycin/Zosyn and continued on Zosyn. Last febrile to 101 on A.M. of consult.     PAST MEDICAL & SURGICAL HISTORY:  Chronic pancreatitis, unspecified pancreatitis type  No significant past surgical history      Allergies  No Known Allergies        ANTIMICROBIALS:  piperacillin/tazobactam IVPB. 3.375 every 8 hours      OTHER MEDS: MEDICATIONS  (STANDING):  heparin  Injectable 5000 every 12 hours  influenza   Vaccine 0.5 once  pantoprazole    Tablet 40 before breakfast      SOCIAL HISTORY:Prior 1 PPD smoking history but quit > 10 years ago. Social EtOH use. No recreational drug use. Moved from [place] > 30 years ago. Last visited [place] ~6 years ago. Reportedly normal TB screening upon immigration    FAMILY HISTORY:  No pertinent family history in first degree relatives      REVIEW OF SYSTEMS  [  ] ROS unobtainable because:    [ x ] All other systems negative except as noted below:	    Constitutional:  [x ] fever [ x] chills  [ ] weight loss  [ ] weakness  Skin:  [ ] rash [ ] phlebitis	  Eyes: [ ] icterus [ ] pain  [ ] discharge	  ENMT: [ ] sore throat  [ ] thrush [ ] ulcers [ ] exudates  Respiratory: [ ] dyspnea [ ] hemoptysis [ ] cough [ ] sputum	  Cardiovascular:  [ ] chest pain [ ] palpitations [ ] edema	  Gastrointestinal:  [ ] nausea [ ] vomiting [ ] diarrhea [ ] constipation [ ] pain	  Genitourinary:  [ ] dysuria [ ] frequency [ ] hematuria [ ] discharge [ ] flank pain  [ ] incontinence  Musculoskeletal:  [ ] myalgias [ ] arthralgias [ ] arthritis  [ ] back pain  Neurological:  [ ] headache [ ] seizures  [ ] confusion/altered mental status  Psychiatric:  [ ] anxiety [ ] depression	  Hematology/Lymphatics:  [ ] lymphadenopathy  Endocrine:  [ ] adrenal [ ] thyroid  Allergic/Immunologic:	 [ ] transplant [ ] seasonal    Vital Signs Last 24 Hrs  T(F): 101 (18 @ 06:05), Max: 101 (18 @ 06:05)    Vital Signs Last 24 Hrs  HR: 93 (18 @ 06:05) (75 - 114)  BP: 91/59 (18 @ 06:05) (78/46 - 100/66)  RR: 18 (18 @ 06:05)  SpO2: 96% (18 @ 06:05) (96% - 100%)  Wt(kg): --    PHYSICAL EXAMINATION:  General: Alert and Awake, NAD  HEENT: PERRL, EOMI, No subconjunctival hemorrhages, Oropharynx Clear, MMM  Neck: Supple, No TAHIR  Cardiac: RRR, No M/R/G  Chest: Scar at midline chest - well healed.   Resp: CTAB, No Wh/Rh/Ra  Abdomen: NBS, NT/ND, No HSM, No rigidity or guarding  MSK: No LE edema. No stigmata of IE. No evidence of phlebitis. No evidence of synovitis.  : No caballero  Skin: No rashes or lesions. Skin is warm and dry to the touch.   Neuro: Alert and Awake. CN 2-12 Grossly intact. Moves all four extremities spontaneously.  Psych: Calm, Pleasant, Cooperative                        9.2    15.6  )-----------( 917      ( 28 Sep 2018 16:33 )             29.3           138  |  108  |  7   ----------------------------<  93  3.9   |  20<L>  |  0.79    Ca    7.8<L>      29 Sep 2018 06:27  Phos  2.6       Mg     2.0         TPro  5.6<L>  /  Alb  2.5<L>  /  TBili  0.4  /  DBili  x   /  AST  11  /  ALT  15  /  AlkPhos  73        Urinalysis Basic - ( 28 Sep 2018 18:36 )    Color: Light Yellow / Appearance: Clear / S.010 / pH: x  Gluc: x / Ketone: Negative  / Bili: Negative / Urobili: Negative   Blood: x / Protein: Negative / Nitrite: Negative   Leuk Esterase: Negative / RBC: 2 /hpf / WBC 1 /hpf   Sq Epi: x / Non Sq Epi: 0 /hpf / Bacteria: 0.0        MICROBIOLOGY:    Rapid RVP Result: Arlenetec ( @ 16:33)    RADIOLOGY:  EXAM:  XR CHEST PORTABLE ROUTINE 1V                        PROCEDURE DATE:  2018    Possible trace left pleural fluid. HPI:    67 year old Cantonese speaking male with PMH chronic pancreatitis who presented to [facility] on  with fever of 102 since the day prior to admission. Recent admission to [facility] 18 - 18 for hematemesis and melena and EGD on 18 with duodenitis. Patient placed on Protonix 40 mg PO BID and discharged back to rehab. Since discharge from [facility] patient developed fevers to Tmax of 102. Patient presented to urgent care and had unremarkable physical exam and labwork significant for WBC of 13. Patient was still having fevers after the visit so patient was referred for admission. patient denied abdominal pain, N/V/D. He denies cough, shortness of breath, sore throat or rhinorrhea. No dysuria, urinary frequency or hesitancy.     Of note in July of this year, patient was admitted to [facility] for episode of pancreatitis. Found to have necrotizing pancreatitis, and the patient spent at least a few days in the ICU prior to discharge. Since then, the patient has presented to [facility] 2-3 additional times for bouts of abdominal pain similar to those he experienced from pancreatitis. He was noted on multiple scans to have yandy-pancreatic fluid collections (reportedly patient has had these sampled with EUS FNA with pathology negative for malignancy). Patient sent to Rehab in August with 2-3 weeks of IV antibiotics (son believes it was Ertapenem) with plans for repeat imaging.  He was recently referred to Dr. Hinojosa and saw him once in the office as a new patient. 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 Of note, on CT Abdomen/Pelvis dates  he has also been noted to have Bilateral pericolic gutter nodular soft tissue deposits concerning for peritoneal carcinomatosis.     In the ED febrile to 100.4, tachycardic to > 110, hypotensive to SBP in the 80's. WBC on presentation of 15.6 with 76.1% PMN. Lipase of 64, amylase of 132. Lactic acid of 1.4. U/A with 1 WBC. RVP negative. CXR with Possible trace left pleural fluid. In the ED received a dose of Vancomycin/Zosyn and continued on Zosyn. Last febrile to 101 on A.M. of consult.     PAST MEDICAL & SURGICAL HISTORY:  Chronic pancreatitis, unspecified pancreatitis type  No significant past surgical history      Allergies  No Known Allergies        ANTIMICROBIALS:  piperacillin/tazobactam IVPB. 3.375 every 8 hours      OTHER MEDS: MEDICATIONS  (STANDING):  heparin  Injectable 5000 every 12 hours  influenza   Vaccine 0.5 once  pantoprazole    Tablet 40 before breakfast      SOCIAL HISTORY:Prior 1 PPD smoking history but quit > 10 years ago. Social EtOH use. No recreational drug use. Moved from [place] > 30 years ago. Last visited [place] ~6 years ago. Reportedly normal TB screening upon immigration    FAMILY HISTORY:  No pertinent family history in first degree relatives      REVIEW OF SYSTEMS  [  ] ROS unobtainable because:    [ x ] All other systems negative except as noted below:	    Constitutional:  [x ] fever [ x] chills  [ ] weight loss  [ ] weakness  Skin:  [ ] rash [ ] phlebitis	  Eyes: [ ] icterus [ ] pain  [ ] discharge	  ENMT: [ ] sore throat  [ ] thrush [ ] ulcers [ ] exudates  Respiratory: [ ] dyspnea [ ] hemoptysis [ ] cough [ ] sputum	  Cardiovascular:  [ ] chest pain [ ] palpitations [ ] edema	  Gastrointestinal:  [ ] nausea [ ] vomiting [ ] diarrhea [ ] constipation [ ] pain	  Genitourinary:  [ ] dysuria [ ] frequency [ ] hematuria [ ] discharge [ ] flank pain  [ ] incontinence  Musculoskeletal:  [ ] myalgias [ ] arthralgias [ ] arthritis  [ ] back pain  Neurological:  [ ] headache [ ] seizures  [ ] confusion/altered mental status  Psychiatric:  [ ] anxiety [ ] depression	  Hematology/Lymphatics:  [ ] lymphadenopathy  Endocrine:  [ ] adrenal [ ] thyroid  Allergic/Immunologic:	 [ ] transplant [ ] seasonal    Vital Signs Last 24 Hrs  T(F): 101 (18 @ 06:05), Max: 101 (18 @ 06:05)    Vital Signs Last 24 Hrs  HR: 93 (18 @ 06:05) (75 - 114)  BP: 91/59 (18 @ 06:05) (78/46 - 100/66)  RR: 18 (18 @ 06:05)  SpO2: 96% (18 @ 06:05) (96% - 100%)  Wt(kg): --    PHYSICAL EXAMINATION:  General: Alert and Awake, NAD  HEENT: PERRL, EOMI, No subconjunctival hemorrhages, Oropharynx Clear, MMM  Neck: Supple, No TAHIR  Cardiac: RRR, No M/R/G  Chest: Scar at midline chest - well healed.   Resp: CTAB, No Wh/Rh/Ra  Abdomen: NBS, NT/ND, No HSM, No rigidity or guarding  MSK: No LE edema. No stigmata of IE. No evidence of phlebitis. No evidence of synovitis.  : No caballero  Skin: No rashes or lesions. Skin is warm and dry to the touch.   Neuro: Alert and Awake. CN 2-12 Grossly intact. Moves all four extremities spontaneously.  Psych: Calm, Pleasant, Cooperative                        9.2    15.6  )-----------( 917      ( 28 Sep 2018 16:33 )             29.3           138  |  108  |  7   ----------------------------<  93  3.9   |  20<L>  |  0.79    Ca    7.8<L>      29 Sep 2018 06:27  Phos  2.6       Mg     2.0         TPro  5.6<L>  /  Alb  2.5<L>  /  TBili  0.4  /  DBili  x   /  AST  11  /  ALT  15  /  AlkPhos  73        Urinalysis Basic - ( 28 Sep 2018 18:36 )    Color: Light Yellow / Appearance: Clear / S.010 / pH: x  Gluc: x / Ketone: Negative  / Bili: Negative / Urobili: Negative   Blood: x / Protein: Negative / Nitrite: Negative   Leuk Esterase: Negative / RBC: 2 /hpf / WBC 1 /hpf   Sq Epi: x / Non Sq Epi: 0 /hpf / Bacteria: 0.0        MICROBIOLOGY:    Rapid RVP Result: Arlenetec ( @ 16:33)    RADIOLOGY:  EXAM:  XR CHEST PORTABLE ROUTINE 1V                        PROCEDURE DATE:  2018    Possible trace left pleural fluid. HPI:    67 year old Cantonese speaking male with PMH chronic pancreatitis who presented to [facility] on  with fever of 102 since the day prior to admission. Recent admission to [facility] 18 - 18 for hematemesis and melena and EGD on 18 with duodenitis. Patient placed on Protonix 40 mg PO BID and discharged back to rehab. Since discharge from [facility] patient developed fevers to Tmax of 102. Patient presented to urgent care and had unremarkable physical exam and labwork significant for WBC of 13. Patient was still having fevers after the visit so patient was referred for admission. patient denied abdominal pain, N/V/D. He denies cough, shortness of breath, sore throat or rhinorrhea. No dysuria, urinary frequency or hesitancy.     Of note in July of this year, patient was admitted to [facility] for episode of pancreatitis. Found to have necrotizing pancreatitis, and the patient spent at least a few days in the ICU prior to discharge. Since then, the patient has presented to [facility] 2-3 additional times for bouts of abdominal pain similar to those he experienced from pancreatitis. He was noted on multiple scans to have yandy-pancreatic fluid collections (reportedly patient has had these sampled with EUS FNA with pathology negative for malignancy). On CT abdomen/pelvis from 18 found to have persistent multiloculated cystic masses in the pancreas and loculated ascites measuring 3.4 x 2.8 x 5.1 around the right colon concerning for abscess. After his repeat admission to [facility] patient was sent to Rehab in August with 2-3 weeks of IV antibiotics (son believes it was Ertapenem) with plans for repeat imaging.  He was recently referred to Dr. Hinojosa and saw him once in the office as a new patient. Of note, the patient was found to have a portal vein thrombosis, likely secondary to his pancreatitis, for which he was prescribed coumadin. 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 Of note, on CT Abdomen/Pelvis dated  he has also been noted to have Bilateral pericolic gutter nodular soft tissue deposits concerning for peritoneal carcinomatosis.     In the ED febrile to 100.4, tachycardic to > 110, hypotensive to SBP in the 80's. WBC on presentation of 15.6 with 76.1% PMN. Lipase of 64, amylase of 132. Lactic acid of 1.4. U/A with 1 WBC. RVP negative. CXR with Possible trace left pleural fluid. In the ED received a dose of Vancomycin/Zosyn and continued on Zosyn. Last febrile to 101 on A.M. of consult.     PAST MEDICAL & SURGICAL HISTORY:  Chronic pancreatitis, unspecified pancreatitis type  No significant past surgical history      Allergies  No Known Allergies        ANTIMICROBIALS:  piperacillin/tazobactam IVPB. 3.375 every 8 hours      OTHER MEDS: MEDICATIONS  (STANDING):  heparin  Injectable 5000 every 12 hours  influenza   Vaccine 0.5 once  pantoprazole    Tablet 40 before breakfast      SOCIAL HISTORY:Prior 1 PPD smoking history but quit > 10 years ago. Social EtOH use. No recreational drug use. Moved from [place] > 30 years ago. Last visited [place] ~6 years ago. Reportedly normal TB screening upon immigration    FAMILY HISTORY:  No pertinent family history in first degree relatives      REVIEW OF SYSTEMS  [  ] ROS unobtainable because:    [ x ] All other systems negative except as noted below:	    Constitutional:  [x ] fever [ x] chills  [ ] weight loss  [ ] weakness  Skin:  [ ] rash [ ] phlebitis	  Eyes: [ ] icterus [ ] pain  [ ] discharge	  ENMT: [ ] sore throat  [ ] thrush [ ] ulcers [ ] exudates  Respiratory: [ ] dyspnea [ ] hemoptysis [ ] cough [ ] sputum	  Cardiovascular:  [ ] chest pain [ ] palpitations [ ] edema	  Gastrointestinal:  [ ] nausea [ ] vomiting [ ] diarrhea [ ] constipation [ ] pain	  Genitourinary:  [ ] dysuria [ ] frequency [ ] hematuria [ ] discharge [ ] flank pain  [ ] incontinence  Musculoskeletal:  [ ] myalgias [ ] arthralgias [ ] arthritis  [ ] back pain  Neurological:  [ ] headache [ ] seizures  [ ] confusion/altered mental status  Psychiatric:  [ ] anxiety [ ] depression	  Hematology/Lymphatics:  [ ] lymphadenopathy  Endocrine:  [ ] adrenal [ ] thyroid  Allergic/Immunologic:	 [ ] transplant [ ] seasonal    Vital Signs Last 24 Hrs  T(F): 101 (18 @ 06:05), Max: 101 (18 @ 06:05)    Vital Signs Last 24 Hrs  HR: 93 (18 @ 06:05) (75 - 114)  BP: 91/59 (18 @ 06:05) (78/46 - 100/66)  RR: 18 (18 @ 06:05)  SpO2: 96% (18 @ 06:05) (96% - 100%)  Wt(kg): --    PHYSICAL EXAMINATION:  General: Alert and Awake, NAD  HEENT: PERRL, EOMI, No subconjunctival hemorrhages, Oropharynx Clear, MMM  Neck: Supple, No TAHIR  Cardiac: RRR, No M/R/G  Chest: Scar at midline chest - well healed.   Resp: CTAB, No Wh/Rh/Ra  Abdomen: NBS, NT/ND, No HSM, No rigidity or guarding  MSK: No LE edema. No stigmata of IE. No evidence of phlebitis. No evidence of synovitis.  : No caballero  Skin: No rashes or lesions. Skin is warm and dry to the touch.   Neuro: Alert and Awake. CN 2-12 Grossly intact. Moves all four extremities spontaneously.  Psych: Calm, Pleasant, Cooperative                        9.2    15.6  )-----------( 917      ( 28 Sep 2018 16:33 )             29.3           138  |  108  |  7   ----------------------------<  93  3.9   |  20<L>  |  0.79    Ca    7.8<L>      29 Sep 2018 06:27  Phos  2.6       Mg     2.0         TPro  5.6<L>  /  Alb  2.5<L>  /  TBili  0.4  /  DBili  x   /  AST  11  /  ALT  15  /  AlkPhos  73        Urinalysis Basic - ( 28 Sep 2018 18:36 )    Color: Light Yellow / Appearance: Clear / S.010 / pH: x  Gluc: x / Ketone: Negative  / Bili: Negative / Urobili: Negative   Blood: x / Protein: Negative / Nitrite: Negative   Leuk Esterase: Negative / RBC: 2 /hpf / WBC 1 /hpf   Sq Epi: x / Non Sq Epi: 0 /hpf / Bacteria: 0.0        MICROBIOLOGY:    Rapid RVP Result: [place] ( @ 16:33)    RADIOLOGY:  EXAM:  XR CHEST PORTABLE ROUTINE 1V                        PROCEDURE DATE:  2018    Possible trace left pleural fluid.

## 2018-09-29 NOTE — CONSULT NOTE ADULT - ASSESSMENT
67 year old Cantonese speaking male with PMH chronic pancreatitis who presented to [facility] on 9/28 with fever of 102 since the day prior to admission. Recent admission to [facility] 9/13/18 - 9/18/18 for hematemesis and melena and EGD on 9/18/18 with duodenitis. In the ED febrile to 100.4, tachycardic to > 110, hypotensive to SBP in the 80's. WBC on presentation of 15.6 with 76.1% PMN. Lipase of 64, amylase of 132. Lactic acid of 1.4. U/A with 1 WBC. RVP negative. CXR with Possible trace left pleural fluid. In the ED received a dose of Vancomycin/Zosyn and continued on Zosyn. Last febrile to 101 on A.M. of consult. 67 year old Cantonese speaking male with PMH chronic pancreatitis (with pseudocysts, history of intraabdominal abscess and question of peritoneal carcinomatosis) and who presented to [facility] on 9/28 with fever of 102 since the day prior to admission. Recent admission to [facility] 9/13/18 - 9/18/18 for hematemesis and melena and EGD on 9/18/18 with duodenitis. In the ED febrile to 100.4, tachycardic to > 110, hypotensive to SBP in the 80's. WBC on presentation of 15.6 with 76.1% PMN. Lipase of 64, amylase of 132. Lactic acid of 1.4. U/A with 1 WBC. RVP negative. CXR with Possible trace left pleural fluid.     Overall, fevers/sepsis of likely intraabdominal etiology. Would followup on MRI Abdomen to evaluate for abscess or signs of infected pseudocyst.     --Continue Zosyn 3.375 mg IV Q8H  --F/U Blood Cultures  --F/U MRI Abdomen with IV Contrast 67 year old Cantonese speaking male with PMH chronic pancreatitis (with pseudocysts, history of intraabdominal abscess and question of peritoneal carcinomatosis) and who presented to [facility] on 9/28 with fever of 102 since the day prior to admission. Recent admission to [facility] 9/13/18 - 9/18/18 for hematemesis and melena and EGD on 9/18/18 with duodenitis. In the ED febrile to 100.4, tachycardic to > 110, hypotensive to SBP in the 80's. WBC on presentation of 15.6 with 76.1% PMN. Lipase of 64, amylase of 132. Lactic acid of 1.4. U/A with 1 WBC. RVP negative. CXR with Possible trace left pleural fluid.     Overall, fevers/sepsis of likely intraabdominal etiology. Would followup on MRI Abdomen to evaluate for new/worsening abscess or signs of infected pseudocyst.     --Continue Zosyn 3.375 mg IV Q8H  --F/U Blood Cultures  --F/U MRI Abdomen with IV Contrast

## 2018-09-29 NOTE — PROGRESS NOTE ADULT - ASSESSMENT
pt w hx recurrent pancreatitis w/ fever /leukocytosis /r/o sepsis  mr abd pending   u.s abd r/o pv thrombus  clears  iv fluids  dvt proph  ppis  gi eval  id eval noted  abs as per id  f/u cultures

## 2018-09-29 NOTE — PROGRESS NOTE ADULT - SUBJECTIVE AND OBJECTIVE BOX
CHIEF COMPLAINT:Patient is a 67y old  Male who presents with a chief complaint of Fever (29 Sep 2018 07:49)    	        PAST MEDICAL & SURGICAL HISTORY:  Chronic pancreatitis, unspecified pancreatitis type  No significant past surgical history          REVIEW OF SYSTEMS:  CONSTITUTIONAL: feels better   EYES: No eye pain, visual disturbances, or discharge  NECK: No pain or stiffness  RESPIRATORY: No cough, wheezing, chills or hemoptysis; No Shortness of Breath  CARDIOVASCULAR: No chest pain, palpitations, passing out, dizziness, or leg swelling  GASTROINTESTINAL: less abd complaints    No nausea, vomiting, or hematemesis; No diarrhea or constipation. No melena or hematochezia.  GENITOURINARY: No dysuria, frequency, hematuria, or incontinence  NEUROLOGICAL: No headaches,  MUSCULOSKELETAL: No joint pain or swelling; No muscle, back, or extremity pain    Medications:  MEDICATIONS  (STANDING):  heparin  Injectable 5000 Unit(s) SubCutaneous every 12 hours  influenza   Vaccine 0.5 milliLiter(s) IntraMuscular once  lactated ringers. 1000 milliLiter(s) (125 mL/Hr) IV Continuous <Continuous>  pantoprazole    Tablet 40 milliGRAM(s) Oral before breakfast  piperacillin/tazobactam IVPB. 3.375 Gram(s) IV Intermittent every 8 hours  sodium chloride 0.9%. 1000 milliLiter(s) (150 mL/Hr) IV Continuous <Continuous>    MEDICATIONS  (PRN):    	    PHYSICAL EXAM:  T(C): 38.3 (09-29-18 @ 06:05), Max: 38.3 (09-29-18 @ 06:05)  HR: 93 (09-29-18 @ 06:05) (75 - 114)  BP: 91/59 (09-29-18 @ 06:05) (78/46 - 100/66)  RR: 18 (09-29-18 @ 06:05) (17 - 23)  SpO2: 96% (09-29-18 @ 06:05) (96% - 100%)  Wt(kg): --  I&O's Summary    28 Sep 2018 07:01  -  29 Sep 2018 07:00  --------------------------------------------------------  IN: 0 mL / OUT: 600 mL / NET: -600 mL        Appearance: Normal	  HEENT:   Normal oral mucosa, PERRL, EOMI	  Lymphatic: No lymphadenopathy  Cardiovascular: Normal S1 S2, No JVD, No murmurs, No edema  Respiratory: Lungs clear to auscultation	  Psychiatry: A & O x 3, Mood & affect appropriate  Gastrointestinal:  Soft, Non-tender, + BS	  Skin: No rashes, No ecchymoses, No cyanosis	  Neurologic: Non-focal  Extremities: Normal range of motion, No clubbing, cyanosis or edema  Vascular: Peripheral pulses palpable 2+ bilaterally    TELEMETRY: 	    ECG:  	  RADIOLOGY:  OTHER: 	  	  LABS:	 	    CARDIAC MARKERS:                                9.2    15.6  )-----------( 917      ( 28 Sep 2018 16:33 )             29.3     09-29    138  |  108  |  7   ----------------------------<  93  3.9   |  20<L>  |  0.79    Ca    7.8<L>      29 Sep 2018 06:27  Phos  2.6     09-29  Mg     2.0     09-28    TPro  5.6<L>  /  Alb  2.5<L>  /  TBili  0.4  /  DBili  x   /  AST  11  /  ALT  15  /  AlkPhos  73  09-29    proBNP:   Lipid Profile:   HgA1c:   TSH:

## 2018-09-29 NOTE — CONSULT NOTE ADULT - ATTENDING COMMENTS
67M from [place] (in [place]) admitted with fever, leucocytosis and meeting "sepsis" criteria. His PMH is remarkable for recuurent/persistent pancreatitis since July 2018. He was initially admitted to [place] with pancreatitis compliacted by abscess versus pseudocyst. Subsequently he has had 2-3 admissions at [facility] for recurrent episodes of abdominal pain (related to the pancreatic fluid collection- not necessarily pancreatitis) and has received antibiotics (one course up to 3 weeks- which son believes was ertapenem). He has had W/U including EUS. Abdominal imaging has shown peritoneal nodules, without lymphadenopathy and fluid collection is complex and may include a mass. No definitive etiology of the pancreatic disease has been established, malignancy is being addressed. The presentation is atypical for primary pancreatic Ca, differential may include intraductal lesions or possible infection. TB can cause peritoneal disease, and while he is from an endemic area, this would be an atypical presentation (no fever, adenopathy, scant ascites, no obvious ileocecal or lung disease).   Would continue zosyn as his WBC is decreasing and has been afebrile. Advance diet as tolerates.  For MRI abdomen to better characterize peripancreatic and peritoneal lesions, will need biopsy to rule out malignancy, also check fro AFB and Fungal cultures/smears.  Check Quantiferon gold   Obtain prior work-up and evaluations conducted at [facility]  ID is available over the weekend at [phone]  Srinath Cassidy MD  pager [phone]  office [phone]

## 2018-09-30 LAB
ALBUMIN SERPL ELPH-MCNC: 2.4 G/DL — LOW (ref 3.3–5)
ALP SERPL-CCNC: 70 U/L — SIGNIFICANT CHANGE UP (ref 40–120)
ALT FLD-CCNC: 10 U/L — SIGNIFICANT CHANGE UP (ref 10–45)
ANION GAP SERPL CALC-SCNC: 11 MMOL/L — SIGNIFICANT CHANGE UP (ref 5–17)
AST SERPL-CCNC: 6 U/L — LOW (ref 10–40)
BILIRUB SERPL-MCNC: 0.4 MG/DL — SIGNIFICANT CHANGE UP (ref 0.2–1.2)
BUN SERPL-MCNC: 4 MG/DL — LOW (ref 7–23)
CALCIUM SERPL-MCNC: 7.8 MG/DL — LOW (ref 8.4–10.5)
CHLORIDE SERPL-SCNC: 107 MMOL/L — SIGNIFICANT CHANGE UP (ref 96–108)
CO2 SERPL-SCNC: 20 MMOL/L — LOW (ref 22–31)
CREAT SERPL-MCNC: 0.95 MG/DL — SIGNIFICANT CHANGE UP (ref 0.5–1.3)
GLUCOSE SERPL-MCNC: 88 MG/DL — SIGNIFICANT CHANGE UP (ref 70–99)
HCT VFR BLD CALC: 24.4 % — LOW (ref 39–50)
HGB BLD-MCNC: 7.7 G/DL — LOW (ref 13–17)
MCHC RBC-ENTMCNC: 28.1 PG — SIGNIFICANT CHANGE UP (ref 27–34)
MCHC RBC-ENTMCNC: 31.6 GM/DL — LOW (ref 32–36)
MCV RBC AUTO: 89.1 FL — SIGNIFICANT CHANGE UP (ref 80–100)
PLATELET # BLD AUTO: 710 K/UL — HIGH (ref 150–400)
POTASSIUM SERPL-MCNC: 3.4 MMOL/L — LOW (ref 3.5–5.3)
POTASSIUM SERPL-SCNC: 3.4 MMOL/L — LOW (ref 3.5–5.3)
PROT SERPL-MCNC: 5.5 G/DL — LOW (ref 6–8.3)
RBC # BLD: 2.74 M/UL — LOW (ref 4.2–5.8)
RBC # FLD: 17.1 % — HIGH (ref 10.3–14.5)
SODIUM SERPL-SCNC: 138 MMOL/L — SIGNIFICANT CHANGE UP (ref 135–145)
WBC # BLD: 8.2 K/UL — SIGNIFICANT CHANGE UP (ref 3.8–10.5)
WBC # FLD AUTO: 8.2 K/UL — SIGNIFICANT CHANGE UP (ref 3.8–10.5)

## 2018-09-30 PROCEDURE — 74182 MRI ABDOMEN W/CONTRAST: CPT | Mod: 26

## 2018-09-30 RX ORDER — POTASSIUM CHLORIDE 20 MEQ
20 PACKET (EA) ORAL ONCE
Qty: 0 | Refills: 0 | Status: COMPLETED | OUTPATIENT
Start: 2018-09-30 | End: 2018-09-30

## 2018-09-30 RX ADMIN — PIPERACILLIN AND TAZOBACTAM 25 GRAM(S): 4; .5 INJECTION, POWDER, LYOPHILIZED, FOR SOLUTION INTRAVENOUS at 06:23

## 2018-09-30 RX ADMIN — HEPARIN SODIUM 5000 UNIT(S): 5000 INJECTION INTRAVENOUS; SUBCUTANEOUS at 06:27

## 2018-09-30 RX ADMIN — SODIUM CHLORIDE 150 MILLILITER(S): 9 INJECTION INTRAMUSCULAR; INTRAVENOUS; SUBCUTANEOUS at 06:23

## 2018-09-30 RX ADMIN — Medication 20 MILLIEQUIVALENT(S): at 16:18

## 2018-09-30 RX ADMIN — HEPARIN SODIUM 5000 UNIT(S): 5000 INJECTION INTRAVENOUS; SUBCUTANEOUS at 16:22

## 2018-09-30 RX ADMIN — PIPERACILLIN AND TAZOBACTAM 25 GRAM(S): 4; .5 INJECTION, POWDER, LYOPHILIZED, FOR SOLUTION INTRAVENOUS at 14:50

## 2018-09-30 RX ADMIN — PANTOPRAZOLE SODIUM 40 MILLIGRAM(S): 20 TABLET, DELAYED RELEASE ORAL at 06:23

## 2018-09-30 RX ADMIN — PIPERACILLIN AND TAZOBACTAM 25 GRAM(S): 4; .5 INJECTION, POWDER, LYOPHILIZED, FOR SOLUTION INTRAVENOUS at 22:32

## 2018-09-30 NOTE — PROGRESS NOTE ADULT - ASSESSMENT
pt w hx recurrent pancreatitis w/ fever /leukocytosis /r/o sepsis  mr abd pending   u.s abd r/o pv thrombus  anemia/  guaiacs  gi /fu  heme eval     iv fluids  dvt proph  ppis  id eval noted  abs as per id  f/u cultures

## 2018-09-30 NOTE — PROGRESS NOTE ADULT - SUBJECTIVE AND OBJECTIVE BOX
CHIEF COMPLAINT:Patient is a 67y old  Male who presents with a chief complaint of Fever (29 Sep 2018 07:49)    	        PAST MEDICAL & SURGICAL HISTORY:  Chronic pancreatitis, unspecified pancreatitis type  No significant past surgical history          REVIEW OF SYSTEMS:  CONSTITUTIONAL:feels better  EYES: No eye pain, visual disturbances, or discharge  NECK: No pain or stiffness  RESPIRATORY: No cough, wheezing, chills or hemoptysis; No Shortness of Breath  CARDIOVASCULAR: No chest pain, palpitations, passing out, dizziness, or leg swelling  GASTROINTESTINAL: No abdominal or epigastric pain. No nausea, vomiting, or hematemesis; No diarrhea or constipation. No melena or hematochezia.  GENITOURINARY: No dysuria, frequency, hematuria, or incontinence  NEUROLOGICAL: No headaches,   MUSCULOSKELETAL: No joint pain or swelling; No muscle, back, or extremity pain    Medications:  MEDICATIONS  (STANDING):  heparin  Injectable 5000 Unit(s) SubCutaneous every 12 hours  influenza   Vaccine 0.5 milliLiter(s) IntraMuscular once  lactated ringers. 1000 milliLiter(s) (125 mL/Hr) IV Continuous <Continuous>  pantoprazole    Tablet 40 milliGRAM(s) Oral before breakfast  piperacillin/tazobactam IVPB. 3.375 Gram(s) IV Intermittent every 8 hours  potassium chloride    Tablet ER 20 milliEquivalent(s) Oral once  sodium chloride 0.9%. 1000 milliLiter(s) (150 mL/Hr) IV Continuous <Continuous>    MEDICATIONS  (PRN):    	    PHYSICAL EXAM:  T(C): 36.8 (09-30-18 @ 06:20), Max: 37.3 (09-29-18 @ 20:40)  HR: 76 (09-30-18 @ 06:20) (76 - 90)  BP: 100/64 (09-30-18 @ 06:20) (90/57 - 100/64)  RR: 18 (09-30-18 @ 06:20) (18 - 18)  SpO2: 99% (09-30-18 @ 06:20) (96% - 100%)  Wt(kg): --  I&O's Summary    29 Sep 2018 07:01  -  30 Sep 2018 07:00  --------------------------------------------------------  IN: 2320 mL / OUT: 0 mL / NET: 2320 mL        Appearance: Normal	  HEENT:   Normal oral mucosa, PERRL, EOMI	  Lymphatic: No lymphadenopathy  Cardiovascular: Normal S1 S2, No JVD, No murmurs, No edema  Respiratory: Lungs clear to auscultation	  Psychiatry: A & O x 3, Mood & affect appropriate  Gastrointestinal:  Soft, Non-tender, + BS	  Skin: No rashes, No ecchymoses, No cyanosis	  Neurologic: Non-focal  Extremities: Normal range of motion, No clubbing, cyanosis or edema  Vascular: Peripheral pulses palpable 2+ bilaterally    TELEMETRY: 	    ECG:  	  RADIOLOGY:  OTHER: 	  	  LABS:	 	    CARDIAC MARKERS:                                7.7    8.20  )-----------( 710      ( 30 Sep 2018 09:15 )             24.4     09-30    138  |  107  |  4<L>  ----------------------------<  88  3.4<L>   |  20<L>  |  0.95    Ca    7.8<L>      30 Sep 2018 07:23  Phos  2.6     09-29  Mg     2.0     09-28    TPro  5.5<L>  /  Alb  2.4<L>  /  TBili  0.4  /  DBili  x   /  AST  6<L>  /  ALT  10  /  AlkPhos  70  09-30    proBNP:   Lipid Profile:   HgA1c:   TSH:

## 2018-10-01 LAB
ANION GAP SERPL CALC-SCNC: 8 MMOL/L — SIGNIFICANT CHANGE UP (ref 5–17)
BLD GP AB SCN SERPL QL: NEGATIVE — SIGNIFICANT CHANGE UP
BUN SERPL-MCNC: <4 MG/DL — LOW (ref 7–23)
CALCIUM SERPL-MCNC: 8 MG/DL — LOW (ref 8.4–10.5)
CHLORIDE SERPL-SCNC: 106 MMOL/L — SIGNIFICANT CHANGE UP (ref 96–108)
CO2 SERPL-SCNC: 23 MMOL/L — SIGNIFICANT CHANGE UP (ref 22–31)
CREAT SERPL-MCNC: 0.75 MG/DL — SIGNIFICANT CHANGE UP (ref 0.5–1.3)
FERRITIN SERPL-MCNC: 560 NG/ML — HIGH (ref 30–400)
FOLATE SERPL-MCNC: 6.7 NG/ML — SIGNIFICANT CHANGE UP
GAMMA INTERFERON BACKGROUND BLD IA-ACNC: 0.05 IU/ML — SIGNIFICANT CHANGE UP
GLUCOSE SERPL-MCNC: 100 MG/DL — HIGH (ref 70–99)
GRAM STN FLD: SIGNIFICANT CHANGE UP
HAPTOGLOB SERPL-MCNC: 405 MG/DL — HIGH (ref 34–200)
HCT VFR BLD CALC: 24 % — LOW (ref 39–50)
HGB BLD-MCNC: 7.2 G/DL — LOW (ref 13–17)
IRON SATN MFR SERPL: 10 UG/DL — LOW (ref 45–165)
IRON SATN MFR SERPL: 8 % — LOW (ref 16–55)
LDH SERPL L TO P-CCNC: 115 U/L — SIGNIFICANT CHANGE UP (ref 50–242)
M TB IFN-G BLD-IMP: ABNORMAL
M TB IFN-G CD4+ BCKGRND COR BLD-ACNC: -0.02 IU/ML — SIGNIFICANT CHANGE UP
M TB IFN-G CD4+CD8+ BCKGRND COR BLD-ACNC: -0.01 IU/ML — SIGNIFICANT CHANGE UP
MCHC RBC-ENTMCNC: 26 PG — LOW (ref 27–34)
MCHC RBC-ENTMCNC: 30 GM/DL — LOW (ref 32–36)
MCV RBC AUTO: 86.6 FL — SIGNIFICANT CHANGE UP (ref 80–100)
PLATELET # BLD AUTO: 675 K/UL — HIGH (ref 150–400)
POTASSIUM SERPL-MCNC: 3.4 MMOL/L — LOW (ref 3.5–5.3)
POTASSIUM SERPL-SCNC: 3.4 MMOL/L — LOW (ref 3.5–5.3)
QUANT TB PLUS MITOGEN MINUS NIL: 0.04 IU/ML — SIGNIFICANT CHANGE UP
RBC # BLD: 2.77 M/UL — LOW (ref 4.2–5.8)
RBC # FLD: 17.2 % — HIGH (ref 10.3–14.5)
RH IG SCN BLD-IMP: POSITIVE — SIGNIFICANT CHANGE UP
SODIUM SERPL-SCNC: 137 MMOL/L — SIGNIFICANT CHANGE UP (ref 135–145)
SPECIMEN SOURCE: SIGNIFICANT CHANGE UP
TIBC SERPL-MCNC: 120 UG/DL — LOW (ref 220–430)
UIBC SERPL-MCNC: 110 UG/DL — SIGNIFICANT CHANGE UP (ref 110–370)
VIT B12 SERPL-MCNC: 429 PG/ML — SIGNIFICANT CHANGE UP (ref 232–1245)
WBC # BLD: 8.01 K/UL — SIGNIFICANT CHANGE UP (ref 3.8–10.5)
WBC # FLD AUTO: 8.01 K/UL — SIGNIFICANT CHANGE UP (ref 3.8–10.5)

## 2018-10-01 PROCEDURE — 76700 US EXAM ABDOM COMPLETE: CPT | Mod: 26

## 2018-10-01 PROCEDURE — 99232 SBSQ HOSP IP/OBS MODERATE 35: CPT

## 2018-10-01 RX ORDER — POTASSIUM CHLORIDE 20 MEQ
40 PACKET (EA) ORAL ONCE
Qty: 0 | Refills: 0 | Status: COMPLETED | OUTPATIENT
Start: 2018-10-01 | End: 2018-10-01

## 2018-10-01 RX ADMIN — PIPERACILLIN AND TAZOBACTAM 25 GRAM(S): 4; .5 INJECTION, POWDER, LYOPHILIZED, FOR SOLUTION INTRAVENOUS at 14:11

## 2018-10-01 RX ADMIN — PIPERACILLIN AND TAZOBACTAM 25 GRAM(S): 4; .5 INJECTION, POWDER, LYOPHILIZED, FOR SOLUTION INTRAVENOUS at 06:24

## 2018-10-01 RX ADMIN — SODIUM CHLORIDE 150 MILLILITER(S): 9 INJECTION INTRAMUSCULAR; INTRAVENOUS; SUBCUTANEOUS at 17:33

## 2018-10-01 RX ADMIN — HEPARIN SODIUM 5000 UNIT(S): 5000 INJECTION INTRAVENOUS; SUBCUTANEOUS at 06:21

## 2018-10-01 RX ADMIN — Medication 40 MILLIEQUIVALENT(S): at 17:33

## 2018-10-01 RX ADMIN — SODIUM CHLORIDE 150 MILLILITER(S): 9 INJECTION INTRAMUSCULAR; INTRAVENOUS; SUBCUTANEOUS at 21:52

## 2018-10-01 RX ADMIN — PIPERACILLIN AND TAZOBACTAM 25 GRAM(S): 4; .5 INJECTION, POWDER, LYOPHILIZED, FOR SOLUTION INTRAVENOUS at 21:52

## 2018-10-01 RX ADMIN — HEPARIN SODIUM 5000 UNIT(S): 5000 INJECTION INTRAVENOUS; SUBCUTANEOUS at 17:34

## 2018-10-01 RX ADMIN — PANTOPRAZOLE SODIUM 40 MILLIGRAM(S): 20 TABLET, DELAYED RELEASE ORAL at 06:21

## 2018-10-01 NOTE — PROGRESS NOTE ADULT - ASSESSMENT
pt w hx recurrent pancreatitis w/ fever /leukocytosis /r/o sepsis  mri abd reviewed, await Surg eval w/Dr Galicia to comment on pancreatic lesion  pt reports had bx in [facility], no malignancy  PV thrombus on MRI, US pending  Vasc Surg eval  reluctant to start AC as pt reports bloody BM yesterday and has Hb drop  transfuse  GI to comment reg possibly scope  FOBT  PPI  clears  iv fluids  dvt proph  ppis  id eval noted  f/u cultures  cont abs

## 2018-10-01 NOTE — CONSULT NOTE ADULT - ASSESSMENT
66 yo male Cantonese speaking Male (used  phone)  with PMH of chronic pancreatitis who has had multiple bouts of pancreatitis was admitted with fever and vomiting blood on 9-28-18.     1.  Normocytic Anemia  -  Had  per patient never had a blood transfusion  -  Will send basic anemia labs had recent hematemesis  -  EGD on 9-18-18 with duodenitis  -  Will send a type and cross hb is 7.2 on 10-1-18, he agreed to a transfusion and was discussed with Dr. Baker  -  Will plan for 1 unit pRBC for today with counts dropping    2.  Chronic Pancreatitis  -   MRI abdomen/pelvis 9-30-18 follow up results   -  ?Rule out any malignancy with re-current episodes unclear cause  -   GI followoing    3.  Elevated Platelets  -   Will await iron levels with bleeding recently could be from iron deficeincy  -   Will continue to trend  -   675,000 on 10-1-18 66 yo male Cantonese speaking Male (used  phone)  with PMH of chronic pancreatitis who has had multiple bouts of pancreatitis was admitted with fever and vomiting blood on 9-28-18.     1.  Normocytic Anemia  -  Had  per patient never had a blood transfusion  -  Will send basic anemia labs had recent hematemesis  -  EGD on 9-18-18 with duodenitis  -  Will send a type and cross hb is 7.2 on 10-1-18, he agreed to a transfusion and was discussed with Dr. Baker  -  Will plan for 1 unit pRBC for today with counts dropping    2.  Chronic Pancreatitis  -   MRI abdomen/pelvis 9-30-18 follow up results   -  ?Rule out any malignancy with re-current episodes unclear cause  -   GI followoing    3.  Elevated Platelets  -   Will await iron levels with bleeding recently could be from iron deficeincy    4.  Fever  -   On IV zosyn  -   Await quanteferon gold  per ID  -   Will continue to trend  -   675,000 on 10-1-18

## 2018-10-01 NOTE — PROGRESS NOTE ADULT - ASSESSMENT
67M from [place] ([place]) admitted with fever, leucocytosis and meeting "sepsis" criteria. His PMH is remarkable for recurrent/persistent pancreatitis since July 2018. He was initially admitted to [place] with pancreatitis complicated by abscess versus pseudocyst. Subsequently he has had 2-3 admissions at [facility] for recurrent episodes of abdominal pain (related to the pancreatic fluid collection- not necessarily pancreatitis) and has received antibiotics (one course up to 3 weeks- which son believes was ertapenem). He has had W/U including EUS. Abdominal imaging has shown peritoneal nodules, without lymphadenopathy and fluid collection is complex and may include a mass. No definitive etiology of the pancreatic disease has been established, malignancy is being addressed. The presentation is atypical for primary pancreatic Ca, differential may include intraductal lesions or possible infection. TB can cause peritoneal disease, and while he is from an endemic area, this would be an atypical presentation (no fever, adenopathy, scant ascites, no obvious ileocecal or lung disease).     -Would continue zosyn as his WBC has decreased and has been afebrile.   -Advance diet as tolerates.  -MRI abdomen (done today) to better characterize peripancreatic and peritoneal lesions, will need biopsy to rule out malignancy, also check fro AFB and Fungal cultures/smears.  -Check Quantiferon gold   Obtain prior work-up and evaluations conducted at [facility]  ID is available at [phone]  Srinath Cassidy MD  pager [phone]  office [phone]

## 2018-10-01 NOTE — PROGRESS NOTE ADULT - SUBJECTIVE AND OBJECTIVE BOX
Chief Complaint:  Patient is a 67y old  Male who presents with a chief complaint of Fever (29 Sep 2018 07:49)      Interval Events:   Infectious workup negative (Bcx and UA and Ucx)  Patient on IV zosyn. WBC WNL today. Afebrile overnight    Allergies:  No Known Allergies      Home Medications:    Hospital Medications:  heparin  Injectable 5000 Unit(s) SubCutaneous every 12 hours  influenza   Vaccine 0.5 milliLiter(s) IntraMuscular once  lactated ringers. 1000 milliLiter(s) IV Continuous <Continuous>  pantoprazole    Tablet 40 milliGRAM(s) Oral before breakfast  piperacillin/tazobactam IVPB. 3.375 Gram(s) IV Intermittent every 8 hours  sodium chloride 0.9%. 1000 milliLiter(s) IV Continuous <Continuous>      PMHX/PSHX:  Chronic pancreatitis, unspecified pancreatitis type  No significant past surgical history      Family history:  No pertinent family history in first degree relatives      ROS:   as above      PHYSICAL EXAM:   Vital Signs:  Vital Signs Last 24 Hrs  T(C): 37.2 (01 Oct 2018 06:43), Max: 37.4 (30 Sep 2018 21:59)  T(F): 98.9 (01 Oct 2018 06:43), Max: 99.4 (30 Sep 2018 21:59)  HR: 79 (01 Oct 2018 06:43) (76 - 82)  BP: 103/63 (01 Oct 2018 06:43) (92/58 - 103/67)  BP(mean): --  RR: 18 (01 Oct 2018 06:43) (18 - 18)  SpO2: 97% (01 Oct 2018 06:43) (97% - 99%)  Daily     Daily     GENERAL:  NAD  HEENT:  sclera anicteric  CHEST: CTA bl  HEART: RRR no mgr  ABDOMEN:  soft, nt, nd no rebound or guarding, no masses  EXTEREMITIES:  no cyanosis,clubbing or edema  SKIN:  No rash/erythema/ecchymoses/petechiae/wounds/abscess/warm/dry  NEURO:  aaox3    LABS:                        7.7    8.20  )-----------( 710      ( 30 Sep 2018 09:15 )             24.4     09-30    138  |  107  |  4<L>  ----------------------------<  88  3.4<L>   |  20<L>  |  0.95    Ca    7.8<L>      30 Sep 2018 07:23    TPro  5.5<L>  /  Alb  2.4<L>  /  TBili  0.4  /  DBili  x   /  AST  6<L>  /  ALT  10  /  AlkPhos  70  09-30    LIVER FUNCTIONS - ( 30 Sep 2018 07:23 )  Alb: 2.4 g/dL / Pro: 5.5 g/dL / ALK PHOS: 70 U/L / ALT: 10 U/L / AST: 6 U/L / GGT: x                   Imaging:

## 2018-10-01 NOTE — CONSULT NOTE ADULT - ASSESSMENT
67 year old man with h/o recurrent pancreatitis who presents with fevers and fatigue.     Plan:  - F/u labs and imaging.  - Will discuss with Dr. Hinojosa.    RAFIA Waters, PGY-2  Surgical oncology  p. [phone] 67 year old man with h/o recurrent pancreatitis who presents with fevers and fatigue.    Plan:  - F/u labs and imaging  - Continue antibiotics per ID  - Post transfusion CBC. Trend H/h, transfuse PRN  - Diet: per GI (currently CLD)  - D/w with Dr. Hinojosa.    RAFIA Waters, PGY-2  Surgical oncology  p. [phone]

## 2018-10-01 NOTE — PROGRESS NOTE ADULT - SUBJECTIVE AND OBJECTIVE BOX
CHIEF COMPLAINT:Patient is a 67y old  Male who presents with a chief complaint of Fever (01 Oct 2018 10:20)    Allergies:  No Known Allergies      PAST MEDICAL & SURGICAL HISTORY:  Chronic pancreatitis, unspecified pancreatitis type  No significant past surgical history      FAMILY HISTORY:  No pertinent family history in first degree relatives      REVIEW OF SYSTEMS:  CONSTITUTIONAL: No fever, weight loss, + fatigue  EYES: No eye pain, visual disturbances, or discharge  NECK: No pain or stiffness  RESPIRATORY: No cough or wheezing, no shortness of breath  CARDIOVASCULAR: No chest pain, palpitations, dizziness, or leg swelling  GASTROINTESTINAL: No abdominal or epigastric pain. No nausea, vomiting, diarrhea or constipation  GENITOURINARY: No dysuria, urinary frequency or urgency, no hematuria  NEUROLOGICAL: No headaches, memory loss, loss of strength, numbness, or tremors  SKIN: No itching, burning, rashes, or lesions   MUSCULOSKELETAL: No joint pain or swelling; No muscle, back, or extremity pain    Medications:  MEDICATIONS  (STANDING):  heparin  Injectable 5000 Unit(s) SubCutaneous every 12 hours  influenza   Vaccine 0.5 milliLiter(s) IntraMuscular once  lactated ringers. 1000 milliLiter(s) (125 mL/Hr) IV Continuous <Continuous>  pantoprazole    Tablet 40 milliGRAM(s) Oral before breakfast  piperacillin/tazobactam IVPB. 3.375 Gram(s) IV Intermittent every 8 hours  potassium chloride   Solution 40 milliEquivalent(s) Oral once  sodium chloride 0.9%. 1000 milliLiter(s) (150 mL/Hr) IV Continuous <Continuous>    MEDICATIONS  (PRN):    	    PHYSICAL EXAM:  T(C): 37.1 (10-01-18 @ 14:03), Max: 37.4 (09-30-18 @ 21:59)  HR: 73 (10-01-18 @ 14:03) (71 - 82)  BP: 113/69 (10-01-18 @ 14:03) (97/65 - 113/69)  RR: 18 (10-01-18 @ 14:03) (18 - 18)  SpO2: 100% (10-01-18 @ 14:03) (97% - 100%)  Wt(kg): --  I&O's Summary    30 Sep 2018 07:01  -  01 Oct 2018 07:00  --------------------------------------------------------  IN: 1752 mL / OUT: 4950 mL / NET: -3198 mL    01 Oct 2018 07:01  -  01 Oct 2018 15:43  --------------------------------------------------------  IN: 1435 mL / OUT: 400 mL / NET: 1035 mL        Appearance: Normal	  HEENT:   NCAT, PERRL, EOMI	  Lymphatic: No lymphadenopathy  Cardiovascular: Normal S1 S2, RRR  Respiratory: Lungs clear to auscultation BL  Psychiatry: A & O x 3, Mood & affect appropriate  Gastrointestinal:  Soft, Non-tender, + BS  Skin: No rashes, No ecchymoses, No cyanosis	  Neurologic: Non-focal  Extremities: Normal range of motion, No clubbing, cyanosis or edema    	  LABS:	 	    CARDIAC MARKERS:                                7.2    8.01  )-----------( 675      ( 01 Oct 2018 09:32 )             24.0     10-01    137  |  106  |  <4<L>  ----------------------------<  100<H>  3.4<L>   |  23  |  0.75    Ca    8.0<L>      01 Oct 2018 07:06    TPro  5.5<L>  /  Alb  2.4<L>  /  TBili  0.4  /  DBili  x   /  AST  6<L>  /  ALT  10  /  AlkPhos  70  09-30    proBNP:   Lipid Profile:   HgA1c:   TSH:

## 2018-10-01 NOTE — CONSULT NOTE ADULT - SUBJECTIVE AND OBJECTIVE BOX
[facility] General Surgery Consultation     Patient is a 67y old  Male who presents with a chief complaint of fevers (01 Oct 2018 15:43)      HPI:  68 yo male Cantonese speaking Male with PMHx of chronic pancreatitis who has had multiple bouts of pancreatitis this year now presenting with  fever of 102 since 9/27/18.    Patient has been seen at [facility] for  his pancreatitis and workup there has been non-revealing as to the cause. There were reportedly no gallstones and no ETOH use. He returned to [place] about 2 weeks ago for hematemesis/melena – EGD was done and there was  duodenitis, and bleeding resolved.    Since leaving the hospital, his appetite had returned, was feeling better, pain was well controlled. However, he developed fevers up to 102 – he went to urgent care and had an unremarkable RUQ and labs except for WBC of 13K, but is still having fevers of 102 on 10/28, his GI doctor advised him to come back to the hospital.      Imaging reports mention a 5cm cystic lesions in the pancreas for which apparently FNA with sampling was performed (per son it was unremarkable, will try to get reports from [facility]). He also had lesions in the peritoneum they read as possible peritoneal carcinomatosis vs infectious in nature, sent him home back in August with a few weeks of IV antibiotics and were going to repeat imaging. Primary team is working him up for possible TB. His last travel outside the country was 7 years ago. He reports having had a week of fever and night sweats 2-3 years ago that resolved spontaneously.    Patient is currently afebrile. He notes feeling fatigued and strange all over. He received a unit of PRBCs today for Hgb 7.2. Denies having any pain. Denies any cough, SOB, abdominal pain, diarrhea, melena, BRBPRR, or dysuria. He reports night sweats.      PAST MEDICAL & SURGICAL HISTORY:  Chronic pancreatitis, unspecified pancreatitis type  No significant past surgical history      ROS: 10-point review of systems   FAMILY HISTORY:  No pertinent family history in first degree relatives      SOCIAL HISTORY:    MEDICATIONS  (STANDING):  heparin  Injectable 5000 Unit(s) SubCutaneous every 12 hours  influenza   Vaccine 0.5 milliLiter(s) IntraMuscular once  lactated ringers. 1000 milliLiter(s) (125 mL/Hr) IV Continuous <Continuous>  pantoprazole    Tablet 40 milliGRAM(s) Oral before breakfast  piperacillin/tazobactam IVPB. 3.375 Gram(s) IV Intermittent every 8 hours  sodium chloride 0.9%. 1000 milliLiter(s) (150 mL/Hr) IV Continuous <Continuous>    MEDICATIONS  (PRN):    Allergies    No Known Allergies    Intolerances    Vital Signs Last 24 Hrs  T(C): 36.8 (01 Oct 2018 18:05), Max: 37.4 (30 Sep 2018 21:59)  T(F): 98.3 (01 Oct 2018 18:05), Max: 99.4 (30 Sep 2018 21:59)  HR: 77 (01 Oct 2018 18:05) (67 - 79)  BP: 108/68 (01 Oct 2018 18:05) (100/64 - 113/69)  BP(mean): --  RR: 18 (01 Oct 2018 18:05) (18 - 18)  SpO2: 98% (01 Oct 2018 18:05) (97% - 100%)    Physical Exam:  General: Well developed, well nourished, No Acute Distress  HEENT: Normocephalic Atraumatic, EOMI bl  Neurology: A&Ox3  Abdominal: Soft, Non-Tender, Non-Distended, epigastric scar from childhood (he was playing with sister and fell on an object)  Extremities: No Clubbing, cyanosis or edema, FROM  Skin: warm, dry, normal color, no rash or abnormal lesions      LABS:             7.2    8.01  )-----------( 675      ( 01 Oct 2018 09:32 )             24.0     10-01    137  |  106  |  <4<L>  ----------------------------<  100<H>  3.4<L>   |  23  |  0.75    Ca    8.0<L>      01 Oct 2018 07:06    TPro  5.5<L>  /  Alb  2.4<L>  /  TBili  0.4  /  DBili  x   /  AST  6<L>  /  ALT  10  /  AlkPhos  70  09-30      < from: MR Abdomen w/ IV Cont (09.30.18 @ 14:20) >  FINDINGS:    LOWER CHEST: Small bilateral pleural effusions withassociated   compressive atelectasis.    LIVER: Normal morphology. Subcentimeter segment VIII hemangioma.  BILE DUCTS: Normal caliber.  GALLBLADDER: Cholelithiasis.  SPLEEN: Within normal limits.  PANCREAS: Diffusely decreased T1 signal with delayed enhancement   consistent with history of chronic pancreatitis. Peripancreatic   inflammation particularly in the region of the pancreatic tail where   there is a 6.0 x 4.8 x 5.2 cm collection containing debris with   peripheral enhancement consistent with acute on chronic pancreatitis. No   evidence to suggest necrosis. The main pancreatic duct is mildly dilated   in the pancreatic tail however there is no definite evidence of duct   obstruction/cut off.  ADRENALS: Subcentimeter indeterminate leftadrenal nodules.  KIDNEYS/URETERS: Small bilateral renal cysts. No hydronephrosis.    VISUALIZED PORTIONS:    BOWEL: Within normal limits.   PERITONEUM: No ascites.  RETROPERITONEUM: No lymphadenopathy. Inflammatory changes with a   collection in theleft upper quadrant as above.  VESSELS: No evidence of arterial pseudoaneurysm. The main, right and left   portal veins are acutely thrombosed.   ABDOMINAL WALL: Within normal limits.      IMPRESSION:     Acute on chronic pancreatitis with a 6.0 cm peripherally enhancing   collection at the pancreatic tail with internal debris.    Acute thrombosis of the main, right and left portal veins.    < end of copied text >      < from: US Abdomen Complete (10.01.18 @ 15:50) >  FINDINGS: The liver is normal in size and echotexture. No focal liver   mass is seen. Trace perihepatic fluid is present. There is a small right   pleural effusion.    The main portal vein is small and iregular in caliber butis patent.   Hepatopedal blood flow without phasicity is present in the splenic vein,   main portal vein, and the right and left portal vein branches. No   echogenic thrombus is seen within the veins. The IVC and hepatic veins   are patent and demonstrate normal phasic venous flow. The hepatic artery   is patent and demonstrates a normal spectral waveform with peak systolic   velocity of 62 cm/s.      IMPRESSION:     Interval recanalization of main portal vein and portal vein branches   since MRIof 30 September.    < end of copied text >

## 2018-10-01 NOTE — CONSULT NOTE ADULT - SUBJECTIVE AND OBJECTIVE BOX
Chief Complaint: he is feeling weak.    HPI:    66 yo male Cantonese speaking Male (used  phone)  with PMH of chronic pancreatitis who has had multiple bouts of pancreatitis was admitted with fever and vomiting blood on 9-28-18.  The patient was a [facility] for his pancreatitis and there was no obvious cause.  No gallstones or EtOH use.  The patient was admitted about 2 weeks ago with hematemesis/melena and EGD showed duodenitis.  The patient then developed fevers up to 102 and was re-admitted.  The patient currently has no abdominal pain.  No nausea. No vomiting.  No diarrhea.  No constipation.  No fevers.  No chills.  No leg swelling.  No calf pain.  Overall is feeling weak.  No worsening SOB at rest.          PAST MEDICAL & SURGICAL HISTORY:  Chronic pancreatitis, unspecified pancreatitis type  No significant past surgical history      SOCIAL HISTORY:  Smoking - Non smoker   Alcohol - Social  Drugs - No drug use    FAMILY HISTORY:  No pertinent family history in first degree relatives      MEDICATIONS  (STANDING):  heparin  Injectable 5000 Unit(s) SubCutaneous every 12 hours  influenza   Vaccine 0.5 milliLiter(s) IntraMuscular once  lactated ringers. 1000 milliLiter(s) (125 mL/Hr) IV Continuous <Continuous>  pantoprazole    Tablet 40 milliGRAM(s) Oral before breakfast  piperacillin/tazobactam IVPB. 3.375 Gram(s) IV Intermittent every 8 hours  sodium chloride 0.9%. 1000 milliLiter(s) (150 mL/Hr) IV Continuous <Continuous>    MEDICATIONS  (PRN):      Allergies    No Known Allergies    Intolerances        Vital Signs Last 24 Hrs  T(C): 37.3 (01 Oct 2018 08:11), Max: 37.4 (30 Sep 2018 21:59)  T(F): 99.1 (01 Oct 2018 08:11), Max: 99.4 (30 Sep 2018 21:59)  HR: 71 (01 Oct 2018 08:11) (71 - 82)  BP: 101/66 (01 Oct 2018 08:11) (92/58 - 103/67)  BP(mean): --  RR: 18 (01 Oct 2018 08:11) (18 - 18)  SpO2: 98% (01 Oct 2018 08:11) (97% - 99%)    PHYSICAL EXAM  General: NAD  HEENT: clear oropharynx, anicteric sclera, pale conjunctiva  Neck: supple  CV: normal S1/S2   Lungs: clear to auscultation  Abdomen: soft non-tender non-distended, no hepatosplenomegaly, positive bowel sounds  Ext: no edeam  Skin: no rashes   Lymph Nodes: No LAD in neck  Neuro: alert and oriented X 3, no focal deficits    LABS:                          7.7    8.20  )-----------( 710      ( 30 Sep 2018 09:15 )             24.4         Mean Cell Volume : 89.1 fl  Mean Cell Hemoglobin : 28.1 pg  Mean Cell Hemoglobin Concentration : 31.6 gm/dL  Auto Neutrophil # : x  Auto Lymphocyte # : x  Auto Monocyte # : x  Auto Eosinophil # : x  Auto Basophil # : x  Auto Neutrophil % : x  Auto Lymphocyte % : x  Auto Monocyte % : x  Auto Eosinophil % : x  Auto Basophil % : x      Serial CBC's  09-30 @ 09:15  Hct-24.4 / Hgb-7.7 / Plat-710 / RBC-2.74 / WBC-8.20  Serial CBC's  09-29 @ 08:03  Hct-24.2 / Hgb-7.5 / Plat-738 / RBC-2.71 / WBC-11.36  Serial CBC's  09-28 @ 16:33  Hct-29.3 / Hgb-9.2 / Plat-917 / RBC-3.26 / WBC-15.6      10-01    137  |  106  |  <4<L>  ----------------------------<  100<H>  3.4<L>   |  23  |  0.75    Ca    8.0<L>      01 Oct 2018 07:06    TPro  5.5<L>  /  Alb  2.4<L>  /  TBili  0.4  /  DBili  x   /  AST  6<L>  /  ALT  10  /  AlkPhos  70  09-30

## 2018-10-01 NOTE — PROGRESS NOTE ADULT - SUBJECTIVE AND OBJECTIVE BOX
Patient is a 67y old  Male who presents with a chief complaint of Sepsis (01 Oct 2018 07:30)    Being followed by ID for fever, pancreatitis    Interval history:  remains afebrile with normalization of WBC  No acute events      ROS:  No cough, SOB, CP  No N/V/D./abd pain  No other complaints      Antimicrobials:    piperacillin/tazobactam IVPB. 3.375 Gram(s) IV Intermittent every 8 hours      Vital Signs Last 24 Hrs  T(C): 37.3 (10-01-18 @ 08:11), Max: 37.4 (09-30-18 @ 21:59)  T(F): 99.1 (10-01-18 @ 08:11), Max: 99.4 (09-30-18 @ 21:59)  HR: 71 (10-01-18 @ 08:11) (71 - 82)  BP: 101/66 (10-01-18 @ 08:11) (92/58 - 103/67)  BP(mean): --  RR: 18 (10-01-18 @ 08:11) (18 - 18)  SpO2: 98% (10-01-18 @ 08:11) (97% - 99%)    Physical Exam:    Constitutional Thin, NAD    HEENT EOMI,No pallor or icterus    Neck supple    Chest clear to auscultation    CVS S1 S2 WNl No murmur or rub or gallop    Abd soft BS normal No tenderness no masses    Ext No cyanosis clubbing or edema    IV site no erythema tenderness or discharge    Joints no swelling or LOM    CNS non focal    Lab Data:                          7.7    8.20  )-----------( 710      ( 30 Sep 2018 09:15 )             24.4       10-01    137  |  106  |  <4<L>  ----------------------------<  100<H>  3.4<L>   |  23  |  0.75    Ca    8.0<L>      01 Oct 2018 07:06    TPro  5.5<L>  /  Alb  2.4<L>  /  TBili  0.4  /  DBili  x   /  AST  6<L>  /  ALT  10  /  AlkPhos  70  09-30        .Urine Clean Catch (Midstream)  09-28-18   No growth  --  --      .Blood Blood-Peripheral  09-28-18   No growth to date.  --  --        < from: Xray Chest 1 View- PORTABLE-Routine (09.28.18 @ 16:27) >  EXAM:  XR CHEST PORTABLE ROUTINE 1V                            PROCEDURE DATE:  09/28/2018            INTERPRETATION:  EXAMINATION: XR CHEST PORTABLE ROUTINE 1V    CLINICAL INDICATION: sepsis    TECHNIQUE: Single portable view of the chest was obtained.    COMPARISON: None.    FINDINGS:     The heart is normal in size. There are no focal pulmonary consolidations   or pneumothorax. There is mild blunting of the left costophrenic angle,   which may be secondary to trace pleural fluid versus pleural thickening.    IMPRESSION:   Possible trace left pleural fluid.        < end of copied text >

## 2018-10-01 NOTE — PROGRESS NOTE ADULT - ASSESSMENT
68 yo Cantonese speaking M  with PMHx of chronic pancreatitis who has had multiple bouts of pancreatitis this year now presenting with abdominal pain and fever of 102.    1)Sepsis  Patient presenting with fever of 100.4 and WBC of 15. Now normalized On IV zosyn.  Infectious workup negative  2) Hx of recurrent pancreatitis  3) Peritoneal carcinomatosis ??  Suspicious findings on OSH imaging    - please obtain repeat CT and/or MRI abdomen to evaluate to possible peritoneal carcinomatosis  - please check WBC and fever daily

## 2018-10-02 LAB
ANION GAP SERPL CALC-SCNC: 9 MMOL/L — SIGNIFICANT CHANGE UP (ref 5–17)
BASOPHILS # BLD AUTO: 0 K/UL — SIGNIFICANT CHANGE UP (ref 0–0.2)
BASOPHILS NFR BLD AUTO: 0 % — SIGNIFICANT CHANGE UP (ref 0–2)
BUN SERPL-MCNC: <4 MG/DL — LOW (ref 7–23)
CALCIUM SERPL-MCNC: 8.3 MG/DL — LOW (ref 8.4–10.5)
CHLORIDE SERPL-SCNC: 105 MMOL/L — SIGNIFICANT CHANGE UP (ref 96–108)
CO2 SERPL-SCNC: 24 MMOL/L — SIGNIFICANT CHANGE UP (ref 22–31)
CREAT SERPL-MCNC: 0.79 MG/DL — SIGNIFICANT CHANGE UP (ref 0.5–1.3)
EOSINOPHIL # BLD AUTO: 0.09 K/UL — SIGNIFICANT CHANGE UP (ref 0–0.5)
EOSINOPHIL NFR BLD AUTO: 1 % — SIGNIFICANT CHANGE UP (ref 0–6)
GLUCOSE SERPL-MCNC: 91 MG/DL — SIGNIFICANT CHANGE UP (ref 70–99)
HCT VFR BLD CALC: 28.7 % — LOW (ref 39–50)
HCT VFR BLD CALC: 28.7 % — LOW (ref 39–50)
HGB BLD-MCNC: 8.8 G/DL — LOW (ref 13–17)
HGB BLD-MCNC: 9.2 G/DL — LOW (ref 13–17)
LYMPHOCYTES # BLD AUTO: 1.89 K/UL — SIGNIFICANT CHANGE UP (ref 1–3.3)
LYMPHOCYTES # BLD AUTO: 21 % — SIGNIFICANT CHANGE UP (ref 13–44)
MANUAL SMEAR VERIFICATION: SIGNIFICANT CHANGE UP
MCHC RBC-ENTMCNC: 26.5 PG — LOW (ref 27–34)
MCHC RBC-ENTMCNC: 27.7 PG — SIGNIFICANT CHANGE UP (ref 27–34)
MCHC RBC-ENTMCNC: 30.7 GM/DL — LOW (ref 32–36)
MCHC RBC-ENTMCNC: 32.1 GM/DL — SIGNIFICANT CHANGE UP (ref 32–36)
MCV RBC AUTO: 86.4 FL — SIGNIFICANT CHANGE UP (ref 80–100)
MCV RBC AUTO: 86.4 FL — SIGNIFICANT CHANGE UP (ref 80–100)
MONOCYTES # BLD AUTO: 1.62 K/UL — HIGH (ref 0–0.9)
MONOCYTES NFR BLD AUTO: 18 % — HIGH (ref 2–14)
NEUTROPHILS # BLD AUTO: 5.21 K/UL — SIGNIFICANT CHANGE UP (ref 1.8–7.4)
NEUTROPHILS NFR BLD AUTO: 56 % — SIGNIFICANT CHANGE UP (ref 43–77)
NEUTS BAND # BLD: 2 % — SIGNIFICANT CHANGE UP (ref 0–8)
NIGHT BLUE STAIN TISS: SIGNIFICANT CHANGE UP
NRBC # BLD: 0 /100 — SIGNIFICANT CHANGE UP (ref 0–0)
OB PNL STL: NEGATIVE — SIGNIFICANT CHANGE UP
PLAT MORPH BLD: NORMAL — SIGNIFICANT CHANGE UP
PLATELET # BLD AUTO: 587 K/UL — HIGH (ref 150–400)
PLATELET # BLD AUTO: 598 K/UL — HIGH (ref 150–400)
POTASSIUM SERPL-MCNC: 3.5 MMOL/L — SIGNIFICANT CHANGE UP (ref 3.5–5.3)
POTASSIUM SERPL-SCNC: 3.5 MMOL/L — SIGNIFICANT CHANGE UP (ref 3.5–5.3)
RBC # BLD: 3.32 M/UL — LOW (ref 4.2–5.8)
RBC # BLD: 3.32 M/UL — LOW (ref 4.2–5.8)
RBC # FLD: 16.8 % — HIGH (ref 10.3–14.5)
RBC # FLD: 16.8 % — HIGH (ref 10.3–14.5)
RBC BLD AUTO: NORMAL — SIGNIFICANT CHANGE UP
SODIUM SERPL-SCNC: 138 MMOL/L — SIGNIFICANT CHANGE UP (ref 135–145)
SPECIMEN SOURCE: SIGNIFICANT CHANGE UP
VARIANT LYMPHS # BLD: 2 % — SIGNIFICANT CHANGE UP (ref 0–6)
WBC # BLD: 10.72 K/UL — HIGH (ref 3.8–10.5)
WBC # BLD: 8.99 K/UL — SIGNIFICANT CHANGE UP (ref 3.8–10.5)
WBC # FLD AUTO: 10.72 K/UL — HIGH (ref 3.8–10.5)
WBC # FLD AUTO: 8.99 K/UL — SIGNIFICANT CHANGE UP (ref 3.8–10.5)

## 2018-10-02 PROCEDURE — 99232 SBSQ HOSP IP/OBS MODERATE 35: CPT

## 2018-10-02 RX ADMIN — HEPARIN SODIUM 5000 UNIT(S): 5000 INJECTION INTRAVENOUS; SUBCUTANEOUS at 06:44

## 2018-10-02 RX ADMIN — PIPERACILLIN AND TAZOBACTAM 25 GRAM(S): 4; .5 INJECTION, POWDER, LYOPHILIZED, FOR SOLUTION INTRAVENOUS at 06:44

## 2018-10-02 RX ADMIN — PANTOPRAZOLE SODIUM 40 MILLIGRAM(S): 20 TABLET, DELAYED RELEASE ORAL at 06:44

## 2018-10-02 RX ADMIN — SODIUM CHLORIDE 150 MILLILITER(S): 9 INJECTION INTRAMUSCULAR; INTRAVENOUS; SUBCUTANEOUS at 06:44

## 2018-10-02 RX ADMIN — HEPARIN SODIUM 5000 UNIT(S): 5000 INJECTION INTRAVENOUS; SUBCUTANEOUS at 17:46

## 2018-10-02 NOTE — PROGRESS NOTE ADULT - ASSESSMENT
68 yo M  with PMHx of chronic pancreatitis who has had multiple bouts of pancreatitis this year now presenting with abdominal pain and fever of 102.    1)Sepsis  Patient presenting with fever of 100.4 and WBC of 15. Now normalized On IV zosyn.  Infectious workup negative. May be secondary to acute pancreatitis  2) Hx of recurrent pancreatitis  MRI showing cystic lesion in the pancreatic tail with evidence of acute on chronic pancreatitis with peripancreatic fluid  Lipase of 64. Patient without any symptoms of pancreatitis aside from fever.    - continue IV abx as per primary team  - will discuss with advanced GI team about management of peripancreatic fluid  - monitor fever and WBC daily 68 yo M  with PMHx of chronic pancreatitis who has had multiple bouts of pancreatitis this year now presenting with abdominal pain and fever of 102.    1)Sepsis  Patient presenting with fever of 100.4 and WBC of 15. Now normalized On IV zosyn.  Infectious workup negative. May be secondary to acute pancreatitis  2) Hx of recurrent pancreatitis  MRI showing cystic lesion in the pancreatic tail with evidence of acute on chronic pancreatitis with peripancreatic fluid  Lipase of 64. Patient without any symptoms of pancreatitis aside from fever.  3) walled off pancreas necrosis    Recc:  -will review w radiology to see if amenable to drainage

## 2018-10-02 NOTE — PROGRESS NOTE ADULT - SUBJECTIVE AND OBJECTIVE BOX
CHIEF COMPLAINT:Patient is a 67y old  Male who presents with a chief complaint of Fever (01 Oct 2018 10:20)  feeling better    Allergies:  No Known Allergies      PAST MEDICAL & SURGICAL HISTORY:  Chronic pancreatitis, unspecified pancreatitis type  No significant past surgical history      FAMILY HISTORY:  No pertinent family history in first degree relatives      REVIEW OF SYSTEMS:  CONSTITUTIONAL: No fever, weight loss, less fatigue  EYES: No eye pain, visual disturbances, or discharge  NECK: No pain or stiffness  RESPIRATORY: No cough or wheezing, no shortness of breath  CARDIOVASCULAR: No chest pain, palpitations, dizziness, or leg swelling  GASTROINTESTINAL: No abdominal or epigastric pain. No nausea, vomiting, diarrhea or constipation  GENITOURINARY: No dysuria, urinary frequency or urgency, no hematuria  NEUROLOGICAL: No headaches, memory loss, loss of strength, numbness, or tremors  SKIN: No itching, burning, rashes, or lesions   MUSCULOSKELETAL: No joint pain or swelling; No muscle, back, or extremity pain      Medications:  MEDICATIONS  (STANDING):  heparin  Injectable 5000 Unit(s) SubCutaneous every 12 hours  influenza   Vaccine 0.5 milliLiter(s) IntraMuscular once  lactated ringers. 1000 milliLiter(s) (125 mL/Hr) IV Continuous <Continuous>  pantoprazole    Tablet 40 milliGRAM(s) Oral before breakfast  sodium chloride 0.9%. 1000 milliLiter(s) (150 mL/Hr) IV Continuous <Continuous>    MEDICATIONS  (PRN):    	    PHYSICAL EXAM:  T(C): 37.2 (10-02-18 @ 09:30), Max: 37.2 (10-02-18 @ 09:30)  HR: 67 (10-02-18 @ 09:30) (67 - 77)  BP: 116/62 (10-02-18 @ 09:30) (105/65 - 116/62)  RR: 18 (10-02-18 @ 09:30) (18 - 18)  SpO2: 98% (10-02-18 @ 09:30) (98% - 100%)  Wt(kg): --  I&O's Summary    01 Oct 2018 07:01  -  02 Oct 2018 07:00  --------------------------------------------------------  IN: 3005 mL / OUT: 2500 mL / NET: 505 mL    02 Oct 2018 07:01  -  02 Oct 2018 11:50  --------------------------------------------------------  IN: 0 mL / OUT: 1050 mL / NET: -1050 mL      Appearance: Normal	  HEENT:   NCAT, PERRL, EOMI	  Lymphatic: No lymphadenopathy  Cardiovascular: Normal S1 S2, RRR  Respiratory: Lungs clear to auscultation BL  Psychiatry: A & O x 3, Mood & affect appropriate  Gastrointestinal:  Soft, Non-tender, + BS  Skin: No rashes, No ecchymoses, No cyanosis	  Neurologic: Non-focal  Extremities: Normal range of motion, No clubbing, cyanosis or edema    LABS:	 	    CARDIAC MARKERS:                                9.2    8.99  )-----------( 587      ( 02 Oct 2018 10:50 )             28.7     10-02    138  |  105  |  <4<L>  ----------------------------<  91  3.5   |  24  |  0.79    Ca    8.3<L>      02 Oct 2018 09:11      proBNP:   Lipid Profile:   HgA1c:   TSH:

## 2018-10-02 NOTE — PROGRESS NOTE ADULT - SUBJECTIVE AND OBJECTIVE BOX
Chief Complaint: "I feel stronger".     History of Present Illness:    The patient overall is feeling stronger today after blood.  He does have fevers.  No chest pain.  No abdominal pain.  No increasing SOB.  No nausea.  No vomiting.  No diarrhea.  No constipation.  No obvious active bleeding.  Remainder ROS is stable.       MEDICATIONS  (STANDING):  heparin  Injectable 5000 Unit(s) SubCutaneous every 12 hours  influenza   Vaccine 0.5 milliLiter(s) IntraMuscular once  lactated ringers. 1000 milliLiter(s) (125 mL/Hr) IV Continuous <Continuous>  pantoprazole    Tablet 40 milliGRAM(s) Oral before breakfast  piperacillin/tazobactam IVPB. 3.375 Gram(s) IV Intermittent every 8 hours  sodium chloride 0.9%. 1000 milliLiter(s) (150 mL/Hr) IV Continuous <Continuous>    MEDICATIONS  (PRN):      Allergies    No Known Allergies    Intolerances        Vital Signs Last 24 Hrs  T(C): 36.8 (02 Oct 2018 04:34), Max: 37.3 (01 Oct 2018 08:11)  T(F): 98.2 (02 Oct 2018 04:34), Max: 99.1 (01 Oct 2018 08:11)  HR: 67 (02 Oct 2018 04:34) (67 - 77)  BP: 112/65 (02 Oct 2018 04:34) (101/66 - 113/69)  BP(mean): --  RR: 18 (02 Oct 2018 04:34) (18 - 18)  SpO2: 98% (02 Oct 2018 04:34) (98% - 100%)    PHYSICAL EXAM  General: NAD  HEENT: clear oropharynx, anicteric sclera, pale conjunctiva  Neck: supple  CV: normal S1/S2   Lungs: clear to auscultation, no wheezes  Abdomen: soft non-tender non-distended, no hepatosplenomegaly, positive bowel sounds  Ext: no edema  Skin: no rashes and no petechiae  Lymph Nodes: No LAD in neck  Neuro: alert and oriented X 3    LABS:                          7.2    8.01  )-----------( 675      ( 01 Oct 2018 09:32 )             24.0         Mean Cell Volume : 86.6 fl  Mean Cell Hemoglobin : 26.0 pg  Mean Cell Hemoglobin Concentration : 30.0 gm/dL  Auto Neutrophil # : x  Auto Lymphocyte # : x  Auto Monocyte # : x  Auto Eosinophil # : x  Auto Basophil # : x  Auto Neutrophil % : x  Auto Lymphocyte % : x  Auto Monocyte % : x  Auto Eosinophil % : x  Auto Basophil % : x      Serial CBC's  10-01 @ 09:32  Hct-24.0 / Hgb-7.2 / Plat-675 / RBC-2.77 / WBC-8.01  Serial CBC's  09-30 @ 09:15  Hct-24.4 / Hgb-7.7 / Plat-710 / RBC-2.74 / WBC-8.20  Serial CBC's  09-29 @ 08:03  Hct-24.2 / Hgb-7.5 / Plat-738 / RBC-2.71 / WBC-11.36  Serial CBC's  09-28 @ 16:33  Hct-29.3 / Hgb-9.2 / Plat-917 / RBC-3.26 / WBC-15.6      10-01    137  |  106  |  <4<L>  ----------------------------<  100<H>  3.4<L>   |  23  |  0.75    Ca    8.0<L>      01 Oct 2018 07:06  < from: MR Abdomen w/ IV Cont (09.30.18 @ 14:20) >  IMPRESSION:     Acute on chronic pancreatitis with a 6.0 cm peripherally enhancing   collection at the pancreatic tail with internal debris.    Acute thrombosis of the main, right and left portal veins.    < end of copied text >    TPro  5.5<L>  /  Alb  2.4<L>  /  TBili  0.4  /  DBili  x   /  AST  6<L>  /  ALT  10  /  AlkPhos  70  09-30          Iron - Total Binding Capacity.: 120 ug/dL (10-01 @ 15:24)  Ferritin, Serum: 560 ng/mL (10-01 @ 15:23)  Folate, Serum: 6.7 ng/mL (10-01 @ 15:23)  Vitamin B12, Serum: 429 pg/mL (10-01 @ 15:23)              10-01 @ 15:24    ldh1 --  haptoglobin 405  MICHELLE--  uric acid--

## 2018-10-02 NOTE — PROGRESS NOTE ADULT - SUBJECTIVE AND OBJECTIVE BOX
Chief Complaint:  Patient is a 67y old  Male who presents with a chief complaint of Fever, pancreatitis (02 Oct 2018 07:42)      Interval Events:   No events overnight  Patient MRI results returned showing no evidence of peritoneal carcinomatosis. However, there was evidence of acute on chronic pancreatitis with 6cm peripancreatic fluid with debris.    Allergies:  No Known Allergies      Home Medications:    Hospital Medications:  heparin  Injectable 5000 Unit(s) SubCutaneous every 12 hours  influenza   Vaccine 0.5 milliLiter(s) IntraMuscular once  lactated ringers. 1000 milliLiter(s) IV Continuous <Continuous>  pantoprazole    Tablet 40 milliGRAM(s) Oral before breakfast  piperacillin/tazobactam IVPB. 3.375 Gram(s) IV Intermittent every 8 hours  sodium chloride 0.9%. 1000 milliLiter(s) IV Continuous <Continuous>      PMHX/PSHX:  Chronic pancreatitis, unspecified pancreatitis type  No significant past surgical history      Family history:  No pertinent family history in first degree relatives      ROS:   as above      PHYSICAL EXAM:   Vital Signs:  Vital Signs Last 24 Hrs  T(C): 36.8 (02 Oct 2018 04:34), Max: 37.3 (01 Oct 2018 08:11)  T(F): 98.2 (02 Oct 2018 04:34), Max: 99.1 (01 Oct 2018 08:11)  HR: 67 (02 Oct 2018 04:34) (67 - 77)  BP: 112/65 (02 Oct 2018 04:34) (101/66 - 113/69)  BP(mean): --  RR: 18 (02 Oct 2018 04:34) (18 - 18)  SpO2: 98% (02 Oct 2018 04:34) (98% - 100%)  Daily     Daily     GENERAL:  NAD  HEENT:  sclera anicteric  CHEST: CTA bl  HEART: RRR no mgr  ABDOMEN:  soft, nt, nd no rebound or guarding, no masses  EXTEREMITIES:  no cyanosis,clubbing or edema  SKIN:  No rash/erythema/ecchymoses/petechiae/wounds/abscess/warm/dry  NEURO:  aaox3    LABS:                        7.2    8.01  )-----------( 675      ( 01 Oct 2018 09:32 )             24.0     10-01    137  |  106  |  <4<L>  ----------------------------<  100<H>  3.4<L>   |  23  |  0.75    Ca    8.0<L>      01 Oct 2018 07:06                Imaging:

## 2018-10-02 NOTE — CHART NOTE - NSCHARTNOTEFT_GEN_A_CORE
Case reviewed w Dr. Green  Tentative plan for cystgastrostomy once patient ruled out for pulmonary TB

## 2018-10-02 NOTE — PROGRESS NOTE ADULT - ASSESSMENT
pt w hx recurrent pancreatitis w/ fever /leukocytosis /r/o sepsis  surg eval appreciated  pt reports had bx in [facility], no malignancy  possibly repeat bx/drainage, GI to comment  PV thrombus on MRI, however, US w/o thrombus  Hb stable s/p PRBC, monitor  GI following  FOBT  PPI  clears  iv fluids  dvt proph  ppis  id eval noted  f/u cultures  monitor off abx per ID

## 2018-10-02 NOTE — PROGRESS NOTE ADULT - ASSESSMENT
66 yo male Cantonese speaking Male (used  phone)  with PMH of chronic pancreatitis who has had multiple bouts of pancreatitis was admitted with fever and vomiting blood on 9-28-18.     1.  Normocytic Anemia Secondary AOCD  -  Had  per patient never had a blood transfusion had 1st unit on 10-1-18 without issues  -  Recent hematemesis  -   Labs on 10-1-18 with iron 10, percent sat 8 and ferritin 560 c/w ACOD  -  Vitamin B12 429 and folate 6.7 with   -  EGD on 9-18-18 with duodenitis  -  Follow up CBC after 1 unit pRBC on 10-1-18.    2.  Chronic Pancreatitis  -   MRI abdomen/pelvis 9-30-18 acute on chronic pancreatitis.  6 cm peripherally enhancing collection.  Acute thrombosis of the main, right and left portal veins.   -  ?Rule out any malignancy with re-current episodes unclear cause  -   On SQ heparin    3.  Elevated Platelets  -   iron levels c/w AOCD  -   Likely reactive from pancreatitis will trend    4.  Fever  -   On IV zosyn  -   Await quanteferon gold  per ID - is intermediate  -   Will continue to trend  -   675,000 on 10-1-18    Follow up CBC today post transfusion

## 2018-10-02 NOTE — PROGRESS NOTE ADULT - SUBJECTIVE AND OBJECTIVE BOX
Patient is a 67y old  Male who presents with a chief complaint of Fever (01 Oct 2018 18:35)    Being followed by ID for fever, acute on chronic pancreatitis    Interval history:  No fever, resolved abdominal pain  No acute events      ROS:  Improved appetite  No cough, SOB, CP  No N/V/D./abd pain  No other complaints      Antimicrobials:    piperacillin/tazobactam IVPB. 3.375 Gram(s) IV Intermittent every 8 hours      Vital Signs Last 24 Hrs  T(C): 36.8 (10-02-18 @ 04:34), Max: 37.3 (10-01-18 @ 08:11)  T(F): 98.2 (10-02-18 @ 04:34), Max: 99.1 (10-01-18 @ 08:11)  HR: 67 (10-02-18 @ 04:34) (67 - 77)  BP: 112/65 (10-02-18 @ 04:34) (101/66 - 113/69)  BP(mean): --  RR: 18 (10-02-18 @ 04:34) (18 - 18)  SpO2: 98% (10-02-18 @ 04:34) (98% - 100%)    Physical Exam:    Constitutional Thin, NAD    HEENT EOMI, No pallor or icterus    No oral exudate or erythema    Neck supple     Chest Clear to auscultation    CVS S1 S2    Abd soft BS normal No tenderness no masses, not distended    Ext No cyanosis clubbing or edema    IV site no erythema tenderness or discharge    Joints no swelling or LOM    CNS non focal    Lab Data:                          7.2    8.01  )-----------( 675      ( 01 Oct 2018 09:32 )             24.0       10-01    137  |  106  |  <4<L>  ----------------------------<  100<H>  3.4<L>   |  23  |  0.75    Ca    8.0<L>      01 Oct 2018 07:06          .Sputum Sputum  10-01-18 --  --    Rare polymorphonuclear leukocytes per low power field  No Squamous epithelial cells per low power field  Few Yeast like cells seen per oil power field  Rare Gram positive cocci in pairs seen per oil power field  Moderate Gram Variable Rods seen peroil power field      .Urine Clean Catch (Midstream)  09-28-18   No growth  --  --      .Blood Blood-Peripheral  09-28-18   No growth to date.  --  --    < from: US Abdomen Complete (10.01.18 @ 15:50) >  EXAM:  US ABDOMEN COMPLETE                            PROCEDURE DATE:  10/01/2018            INTERPRETATION:  CLINICAL INFORMATION: Pancreatitis, portal vein   thrombosis. For follow-up.    COMPARISON: Abdominal MRI dated 30 September.    TECHNIQUE: Portal duplex study.    FINDINGS: The liver is normal in size and echotexture. No focal liver   mass is seen. Trace perihepatic fluid is present. There is a small right   pleural effusion.    The main portal vein is small and iregular in caliber butis patent.   Hepatopedal blood flow without phasicity is present in the splenic vein,   main portal vein, and the right and left portal vein branches. No   echogenic thrombus is seen within the veins. The IVC and hepatic veins   are patent and demonstrate normal phasic venous flow. The hepatic artery   is patent and demonstrates a normal spectral waveform with peak systolic   velocity of 62 cm/s.      IMPRESSION:     Interval recanalization of main portal vein and portal vein branches   since MRIof 30 September.      < from: MR Abdomen w/ IV Cont (09.30.18 @ 14:20) >    EXAM:  MR ABDOMEN IC                            PROCEDURE DATE:  09/30/2018            INTERPRETATION:  CLINICAL INFORMATION: Recurrent pancreatitis. Abdominal   pain for 3 months. Fever for 2 days. Evaluate for abscess/mass.    COMPARISON: None.    PROCEDURE:   MRI of the abdomen was performed with and without intravenous contrast.   3D and radial MRCP were performed.  6 cc of Gadavist were administered. 1.5 cc were discarded.    FINDINGS:    LOWER CHEST: Small bilateral pleural effusions withassociated   compressive atelectasis.    LIVER: Normal morphology. Subcentimeter segment VIII hemangioma.  BILE DUCTS: Normal caliber.  GALLBLADDER: Cholelithiasis.  SPLEEN: Within normal limits.  PANCREAS: Diffusely decreased T1 signal with delayed enhancement   consistent with history of chronic pancreatitis. Peripancreatic   inflammation particularly in the region of the pancreatic tail where   there is a 6.0 x 4.8 x 5.2 cm collection containing debris with   peripheral enhancement consistent with acute on chronic pancreatitis. No   evidence to suggest necrosis. The main pancreatic duct is mildly dilated   in the pancreatic tail however there is no definite evidence of duct   obstruction/cut off.  ADRENALS: Subcentimeter indeterminate leftadrenal nodules.  KIDNEYS/URETERS: Small bilateral renal cysts. No hydronephrosis.    VISUALIZED PORTIONS:    BOWEL: Within normal limits.   PERITONEUM: No ascites.  RETROPERITONEUM: No lymphadenopathy. Inflammatory changes with a   collection in theleft upper quadrant as above.  VESSELS: No evidence of arterial pseudoaneurysm. The main, right and left   portal veins are acutely thrombosed.   ABDOMINAL WALL: Within normal limits.      IMPRESSION:     Acute on chronic pancreatitis with a 6.0 cm peripherally enhancing   collection at the pancreatic tail with internal debris.    Acute thrombosis of the main, right and left portal veins.    < end of copied text >

## 2018-10-03 LAB
-  AMIKACIN: SIGNIFICANT CHANGE UP
-  AMPICILLIN/SULBACTAM: SIGNIFICANT CHANGE UP
-  CEFEPIME: SIGNIFICANT CHANGE UP
-  CEFTAZIDIME: SIGNIFICANT CHANGE UP
-  CIPROFLOXACIN: SIGNIFICANT CHANGE UP
-  GENTAMICIN: SIGNIFICANT CHANGE UP
-  IMIPENEM: SIGNIFICANT CHANGE UP
-  LEVOFLOXACIN: SIGNIFICANT CHANGE UP
-  MEROPENEM: SIGNIFICANT CHANGE UP
-  PIPERACILLIN/TAZOBACTAM: SIGNIFICANT CHANGE UP
-  TOBRAMYCIN: SIGNIFICANT CHANGE UP
-  TRIMETHOPRIM/SULFAMETHOXAZOLE: SIGNIFICANT CHANGE UP
ANION GAP SERPL CALC-SCNC: 11 MMOL/L — SIGNIFICANT CHANGE UP (ref 5–17)
APTT BLD: 32.5 SEC — SIGNIFICANT CHANGE UP (ref 27.5–37.4)
BASOPHILS # BLD AUTO: 0.04 K/UL — SIGNIFICANT CHANGE UP (ref 0–0.2)
BASOPHILS NFR BLD AUTO: 0.4 % — SIGNIFICANT CHANGE UP (ref 0–2)
BUN SERPL-MCNC: <4 MG/DL — LOW (ref 7–23)
CALCIUM SERPL-MCNC: 8 MG/DL — LOW (ref 8.4–10.5)
CHLORIDE SERPL-SCNC: 103 MMOL/L — SIGNIFICANT CHANGE UP (ref 96–108)
CO2 SERPL-SCNC: 25 MMOL/L — SIGNIFICANT CHANGE UP (ref 22–31)
CREAT SERPL-MCNC: 0.76 MG/DL — SIGNIFICANT CHANGE UP (ref 0.5–1.3)
CULTURE RESULTS: SIGNIFICANT CHANGE UP
EOSINOPHIL # BLD AUTO: 0.06 K/UL — SIGNIFICANT CHANGE UP (ref 0–0.5)
EOSINOPHIL NFR BLD AUTO: 0.6 % — SIGNIFICANT CHANGE UP (ref 0–6)
GLUCOSE SERPL-MCNC: 90 MG/DL — SIGNIFICANT CHANGE UP (ref 70–99)
HCT VFR BLD CALC: 30 % — LOW (ref 39–50)
HCT VFR BLD CALC: 30.6 % — LOW (ref 39–50)
HGB BLD-MCNC: 9.7 G/DL — LOW (ref 13–17)
HGB BLD-MCNC: 9.8 G/DL — LOW (ref 13–17)
IMM GRANULOCYTES NFR BLD AUTO: 0.4 % — SIGNIFICANT CHANGE UP (ref 0–1.5)
INR BLD: 1.27 RATIO — HIGH (ref 0.88–1.16)
LYMPHOCYTES # BLD AUTO: 1.93 K/UL — SIGNIFICANT CHANGE UP (ref 1–3.3)
LYMPHOCYTES # BLD AUTO: 20.8 % — SIGNIFICANT CHANGE UP (ref 13–44)
MCHC RBC-ENTMCNC: 27.5 PG — SIGNIFICANT CHANGE UP (ref 27–34)
MCHC RBC-ENTMCNC: 28.3 PG — SIGNIFICANT CHANGE UP (ref 27–34)
MCHC RBC-ENTMCNC: 31.7 GM/DL — LOW (ref 32–36)
MCHC RBC-ENTMCNC: 32.7 GM/DL — SIGNIFICANT CHANGE UP (ref 32–36)
MCV RBC AUTO: 86.7 FL — SIGNIFICANT CHANGE UP (ref 80–100)
MCV RBC AUTO: 86.7 FL — SIGNIFICANT CHANGE UP (ref 80–100)
METHOD TYPE: SIGNIFICANT CHANGE UP
METHOD TYPE: SIGNIFICANT CHANGE UP
MONOCYTES # BLD AUTO: 1.54 K/UL — HIGH (ref 0–0.9)
MONOCYTES NFR BLD AUTO: 16.6 % — HIGH (ref 2–14)
NEUTROPHILS # BLD AUTO: 5.65 K/UL — SIGNIFICANT CHANGE UP (ref 1.8–7.4)
NEUTROPHILS NFR BLD AUTO: 61.2 % — SIGNIFICANT CHANGE UP (ref 43–77)
ORGANISM # SPEC MICROSCOPIC CNT: SIGNIFICANT CHANGE UP
PLATELET # BLD AUTO: 546 K/UL — HIGH (ref 150–400)
PLATELET # BLD AUTO: 563 K/UL — HIGH (ref 150–400)
POTASSIUM SERPL-MCNC: 4 MMOL/L — SIGNIFICANT CHANGE UP (ref 3.5–5.3)
POTASSIUM SERPL-SCNC: 4 MMOL/L — SIGNIFICANT CHANGE UP (ref 3.5–5.3)
PROTHROM AB SERPL-ACNC: 14.4 SEC — HIGH (ref 10–13.1)
RBC # BLD: 3.46 M/UL — LOW (ref 4.2–5.8)
RBC # BLD: 3.53 M/UL — LOW (ref 4.2–5.8)
RBC # FLD: 16.8 % — HIGH (ref 10.3–14.5)
RBC # FLD: 16.9 % — HIGH (ref 10.3–14.5)
SODIUM SERPL-SCNC: 139 MMOL/L — SIGNIFICANT CHANGE UP (ref 135–145)
SPECIMEN SOURCE: SIGNIFICANT CHANGE UP
WBC # BLD: 9.26 K/UL — SIGNIFICANT CHANGE UP (ref 3.8–10.5)
WBC # BLD: 9.39 K/UL — SIGNIFICANT CHANGE UP (ref 3.8–10.5)
WBC # FLD AUTO: 9.26 K/UL — SIGNIFICANT CHANGE UP (ref 3.8–10.5)
WBC # FLD AUTO: 9.39 K/UL — SIGNIFICANT CHANGE UP (ref 3.8–10.5)

## 2018-10-03 PROCEDURE — 99232 SBSQ HOSP IP/OBS MODERATE 35: CPT

## 2018-10-03 RX ADMIN — HEPARIN SODIUM 5000 UNIT(S): 5000 INJECTION INTRAVENOUS; SUBCUTANEOUS at 18:26

## 2018-10-03 RX ADMIN — PANTOPRAZOLE SODIUM 40 MILLIGRAM(S): 20 TABLET, DELAYED RELEASE ORAL at 05:48

## 2018-10-03 RX ADMIN — SODIUM CHLORIDE 150 MILLILITER(S): 9 INJECTION INTRAMUSCULAR; INTRAVENOUS; SUBCUTANEOUS at 05:47

## 2018-10-03 RX ADMIN — HEPARIN SODIUM 5000 UNIT(S): 5000 INJECTION INTRAVENOUS; SUBCUTANEOUS at 05:48

## 2018-10-03 NOTE — PROGRESS NOTE ADULT - SUBJECTIVE AND OBJECTIVE BOX
CHIEF COMPLAINT:Patient is a 67y old  Male who presents with a chief complaint of Fever (01 Oct 2018 10:20)  no acute events  Allergies:  No Known Allergies      PAST MEDICAL & SURGICAL HISTORY:  Chronic pancreatitis, unspecified pancreatitis type  No significant past surgical history      FAMILY HISTORY:  No pertinent family history in first degree relatives      REVIEW OF SYSTEMS:  CONSTITUTIONAL: No fever, weight loss, less fatigue  EYES: No eye pain, visual disturbances, or discharge  NECK: No pain or stiffness  RESPIRATORY: No cough or wheezing, no shortness of breath  CARDIOVASCULAR: No chest pain, palpitations, dizziness, or leg swelling  GASTROINTESTINAL: No abdominal or epigastric pain. No nausea, vomiting, diarrhea or constipation  GENITOURINARY: No dysuria, urinary frequency or urgency, no hematuria  NEUROLOGICAL: No headaches, memory loss, loss of strength, numbness, or tremors  SKIN: No itching, burning, rashes, or lesions   MUSCULOSKELETAL: No joint pain or swelling; No muscle, back, or extremity pain      Medications:  MEDICATIONS  (STANDING):  heparin  Injectable 5000 Unit(s) SubCutaneous every 12 hours  influenza   Vaccine 0.5 milliLiter(s) IntraMuscular once  lactated ringers. 1000 milliLiter(s) (125 mL/Hr) IV Continuous <Continuous>  pantoprazole    Tablet 40 milliGRAM(s) Oral before breakfast  sodium chloride 0.9%. 1000 milliLiter(s) (150 mL/Hr) IV Continuous <Continuous>    MEDICATIONS  (PRN):    	    PHYSICAL EXAM:  T(C): 36.8 (10-03-18 @ 07:45), Max: 37.7 (10-02-18 @ 16:37)  HR: 64 (10-03-18 @ 07:45) (60 - 70)  BP: 122/72 (10-03-18 @ 07:45) (103/65 - 137/81)  RR: 18 (10-03-18 @ 07:45) (18 - 18)  SpO2: 99% (10-03-18 @ 07:45) (97% - 99%)  Wt(kg): --  I&O's Summary    02 Oct 2018 07:01  -  03 Oct 2018 07:00  --------------------------------------------------------  IN: 1200 mL / OUT: 4150 mL / NET: -2950 mL    03 Oct 2018 07:01  -  03 Oct 2018 13:16  --------------------------------------------------------  IN: 120 mL / OUT: 0 mL / NET: 120 mL      Appearance: Normal	  HEENT:   NCAT, PERRL, EOMI	  Lymphatic: No lymphadenopathy  Cardiovascular: Normal S1 S2, RRR  Respiratory: Lungs clear to auscultation BL  Psychiatry: A & O x 3, Mood & affect appropriate  Gastrointestinal:  Soft, Non-tender, + BS  Skin: No rashes, No ecchymoses, No cyanosis	  Neurologic: Non-focal  Extremities: Normal range of motion, No clubbing, cyanosis or edema    LABS:	 	    CARDIAC MARKERS:                                9.7    9.26  )-----------( 563      ( 03 Oct 2018 08:45 )             30.6     10-03    139  |  103  |  <4<L>  ----------------------------<  90  4.0   |  25  |  0.76    Ca    8.0<L>      03 Oct 2018 07:27      proBNP:   Lipid Profile:   HgA1c:   TSH:

## 2018-10-03 NOTE — PROGRESS NOTE ADULT - SUBJECTIVE AND OBJECTIVE BOX
INFECTIOUS DISEASES FOLLOW UP--Tera Rogers MD  Pager [phone]    This is a follow up note for this  67y Male with fever  Fevers resolved - off abx.  ? fever from pancreatitis.    Further ROS:  CONSTITUTIONAL:  No fever,  CARDIOVASCULAR:  No chest pain or palpitations  RESPIRATORY:  No dyspnea  GASTROINTESTINAL:  No nausea, vomiting, diarrhea, or abdominal pain  GENITOURINARY:  No dysuria  NEUROLOGIC:  No headache,     Allergies  No Known Allergies    ANTIBIOTICS/RELEVANT:  antimicrobials    immunologic:  influenza   Vaccine 0.5 milliLiter(s) IntraMuscular once    OTHER:  heparin  Injectable 5000 Unit(s) SubCutaneous every 12 hours  lactated ringers. 1000 milliLiter(s) IV Continuous <Continuous>  pantoprazole    Tablet 40 milliGRAM(s) Oral before breakfast  sodium chloride 0.9%. 1000 milliLiter(s) IV Continuous <Continuous>    Objective:  Vital Signs Last 24 Hrs  T(C): 36.8 (03 Oct 2018 07:45), Max: 37.7 (02 Oct 2018 16:37)  T(F): 98.3 (03 Oct 2018 07:45), Max: 99.8 (02 Oct 2018 16:37)  HR: 64 (03 Oct 2018 07:45) (60 - 70)  BP: 122/72 (03 Oct 2018 07:45) (103/65 - 137/81)  BP(mean): --  RR: 18 (03 Oct 2018 07:45) (18 - 18)  SpO2: 99% (03 Oct 2018 07:45) (97% - 99%)    PHYSICAL EXAM:  Constitutional:no acute distress  Eyes:NORBERT, EOMI  Ear/Nose/Throat: no oral lesions, 	  Respiratory: clear BL  Cardiovascular: S1S2  Gastrointestinal:soft, (+) BS, no tenderness  Extremities:no e/e/c  No Lymphadenopathy  IV sites not inflammed.    LABS:                        9.8    9.39  )-----------( 546      ( 03 Oct 2018 08:43 )             30.0     10-03    139  |  103  |  <4<L>  ----------------------------<  90  4.0   |  25  |  0.76    Ca    8.0<L>      03 Oct 2018 07:27      MICROBIOLOGY: sputum AFB x1 negative

## 2018-10-03 NOTE — PROGRESS NOTE ADULT - ASSESSMENT
Imp/Rx:  Resolved fevers, perhaps related to pancreatic inflammation.  Off abx.  Sputums for afb pending.

## 2018-10-03 NOTE — PROGRESS NOTE ADULT - ASSESSMENT
Impression:  1. 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 due to gallbladder sludge    2. Walled off pancreatic necrosis    3. Rule out pulmonary TB    4. Peritoneal nodules seen on outside CT (scanned to [employer])    Recommendation:  -eventual EUS with possible cystgastrostomy when TB ruled out  -check IgG4, MICHELLE  -obtain outside radiology discs    Hilario Christopher M.D.   [facility]  Contact: [phone]

## 2018-10-03 NOTE — PROGRESS NOTE ADULT - SUBJECTIVE AND OBJECTIVE BOX
Chief Complaint:  fu - "I feel better"  History of Present Illness:  The patient overall is feeling stronger today.  He does have low grade fevers.  No chest pain.  No abdominal pain.  No increasing SOB.  No nausea.  No vomiting.  No diarrhea.  No constipation.  No obvious active bleeding.  tolerating diet    MEDICATIONS  (STANDING):  heparin  Injectable 5000 Unit(s) SubCutaneous every 12 hours  influenza   Vaccine 0.5 milliLiter(s) IntraMuscular once  lactated ringers. 1000 milliLiter(s) (125 mL/Hr) IV Continuous <Continuous>  pantoprazole    Tablet 40 milliGRAM(s) Oral before breakfast  sodium chloride 0.9%. 1000 milliLiter(s) (150 mL/Hr) IV Continuous <Continuous>    MEDICATIONS  (PRN):      Allergies    No Known Allergies    Intolerances        Vital Signs Last 24 Hrs  T(C): 36.9 (03 Oct 2018 13:05), Max: 37.5 (02 Oct 2018 23:59)  T(F): 98.4 (03 Oct 2018 13:05), Max: 99.5 (02 Oct 2018 23:59)  HR: 71 (03 Oct 2018 13:05) (60 - 71)  BP: 95/65 (03 Oct 2018 13:05) (95/65 - 137/81)  BP(mean): --  RR: 18 (03 Oct 2018 13:05) (18 - 18)  SpO2: 99% (03 Oct 2018 13:05) (97% - 99%)    PHYSICAL EXAM  General: NAD  HEENT: clear oropharynx, anicteric sclera, pink conjunctiva  Neck: supple  CV: normal S1/S2 with no murmur rubs or gallops  Lungs: clear to auscultation, no wheezes, no rales  Abdomen: soft non-tender non-distended, no hepatosplenomegaly, positive bowel sounds  Ext: no clubbing cyanosis or edema  Skin: no rashes and no petechiae  Lymph Nodes: No LAD in axillae, neck  Neuro: alert and oriented X 3, no focal deficits    LABS:                          9.7    9.26  )-----------( 563      ( 03 Oct 2018 08:45 )             30.6         Mean Cell Volume : 86.7 fl  Mean Cell Hemoglobin : 27.5 pg  Mean Cell Hemoglobin Concentration : 31.7 gm/dL  Auto Neutrophil # : 5.65 K/uL  Auto Lymphocyte # : 1.93 K/uL  Auto Monocyte # : 1.54 K/uL  Auto Eosinophil # : 0.06 K/uL  Auto Basophil # : 0.04 K/uL  Auto Neutrophil % : 61.2 %  Auto Lymphocyte % : 20.8 %  Auto Monocyte % : 16.6 %  Auto Eosinophil % : 0.6 %  Auto Basophil % : 0.4 %      Serial CBC's  10-03 @ 08:45  Hct-30.6 / Hgb-9.7 / Plat-563 / RBC-3.53 / WBC-9.26  Serial CBC's  10-03 @ 08:43  Hct-30.0 / Hgb-9.8 / Plat-546 / RBC-3.46 / WBC-9.39  Serial CBC's  10-02 @ 10:50  Hct-28.7 / Hgb-9.2 / Plat-587 / RBC-3.32 / WBC-8.99  Serial CBC's  10-01 @ 09:32  Hct-24.0 / Hgb-7.2 / Plat-675 / RBC-2.77 / WBC-8.01  Serial CBC's  09-30 @ 09:15  Hct-24.4 / Hgb-7.7 / Plat-710 / RBC-2.74 / WBC-8.20      10-03    139  |  103  |  <4<L>  ----------------------------<  90  4.0   |  25  |  0.76    Ca    8.0<L>      03 Oct 2018 07:27        PT/INR - ( 03 Oct 2018 08:45 )   PT: 14.4 sec;   INR: 1.27 ratio         PTT - ( 03 Oct 2018 08:45 )  PTT:32.5 sec    Iron - Total Binding Capacity.: 120 ug/dL (10-01 @ 15:24)  Ferritin, Serum: 560 ng/mL (10-01 @ 15:23)  Folate, Serum: 6.7 ng/mL (10-01 @ 15:23)  Vitamin B12, Serum: 429 pg/mL (10-01 @ 15:23)          10-01 @ 15:24    ldh1 --  haptoglobin 405  MICHELLE--  uric acid--

## 2018-10-03 NOTE — PROGRESS NOTE ADULT - SUBJECTIVE AND OBJECTIVE BOX
Patient is a 67y old  Male who presents with a chief complaint of fevers (02 Oct 2018 11:49)      SUBJECTIVE / OVERNIGHT EVENTS:  no fever or pain  MEDICATIONS  (STANDING):  heparin  Injectable 5000 Unit(s) SubCutaneous every 12 hours  influenza   Vaccine 0.5 milliLiter(s) IntraMuscular once  lactated ringers. 1000 milliLiter(s) (125 mL/Hr) IV Continuous <Continuous>  pantoprazole    Tablet 40 milliGRAM(s) Oral before breakfast  sodium chloride 0.9%. 1000 milliLiter(s) (150 mL/Hr) IV Continuous <Continuous>    MEDICATIONS  (PRN):              PHYSICAL EXAM:  GENERAL: NAD, well-developed  HEAD:  Atraumatic, Normocephalic  EYES: EOMI, PERRLA, conjunctiva and sclera anicteric  NECK: Supple, No JVD  CHEST/LUNG: Clear to auscultation bilaterally; No wheeze  HEART: Regular rate and rhythm; No murmurs, rubs, or gallops  ABDOMEN: Soft, Nontender, Nondistended; Bowel sounds present, no hepatosplenomegaly, no rebound or guarding  EXTREMITIES:  2+ Peripheral Pulses, No clubbing, cyanosis, or edema  PSYCH: AAOx3  NEUROLOGY: non-focal, no asterixis  SKIN: No rashes or lesion    LABS:                        9.7    9.26  )-----------( 563      ( 03 Oct 2018 08:45 )             30.6     10-03    139  |  103  |  <4<L>  ----------------------------<  90  4.0   |  25  |  0.76    Ca    8.0<L>      03 Oct 2018 07:27        PT/INR - ( 03 Oct 2018 08:45 )   PT: 14.4 sec;   INR: 1.27 ratio         PTT - ( 03 Oct 2018 08:45 )  PTT:32.5 sec          RADIOLOGY & ADDITIONAL TESTS:

## 2018-10-03 NOTE — PROGRESS NOTE ADULT - ASSESSMENT
66 yo male Cantonese speaking Male (used  phone)  with PMH of chronic pancreatitis who has had multiple bouts of pancreatitis was admitted with fever and vomiting blood on 9-28-18.     1.  Normocytic Anemia Secondary AOCD  -  Had  per patient never had a blood transfusion had 1st unit on 10-1-18 without issues  -  Recent hematemesis  -   Labs on 10-1-18 with iron 10, percent sat 8 and ferritin 560 c/w ACOD  -  Vitamin B12 429 and folate 6.7 with   -  EGD on 9-18-18 with duodenitis  -  CBC and hgb stable after 1 unit pRBC on 10-1-18.    2.  Chronic Pancreatitis  -   MRI abdomen/pelvis 9-30-18 acute on chronic pancreatitis.  6 cm peripherally enhancing collection.  Acute thrombosis of the main, right and left portal veins.   -  ?Rule out any malignancy with re-current episodes unclear cause  -   On SQ heparin  - tolerating diet without pain    3.  Elevated Platelets  -   iron levels c/w AOCD  -   Likely reactive from pancreatitis will trend - improving     4.  Fever  -   previously on IV zosyn - now off abx, ID following  -   Await quanteferon gold  per ID - is intermediate  -   awaiting sputum for AFB

## 2018-10-03 NOTE — PROGRESS NOTE ADULT - ASSESSMENT
pt w hx recurrent pancreatitis w/ fever /leukocytosis /r/o sepsis  surg eval appreciated  pt reports had bx in [facility], no malignancy  possibly repeat bx/drainage, GI to comment  PV thrombus on MRI, however, US w/o thrombus  eventual EUS with possible cystgastrostomy when TB ruled out  Hb stable s/p PRBC, monitor  GI following  FOBT  PPI  clears  iv fluids  dvt proph  ppis  id eval noted  f/u cultures  f/u AFB to r/o pulm TB  monitor off abx per ID

## 2018-10-03 NOTE — PROGRESS NOTE ADULT - ASSESSMENT
67 year old man with h/o recurrent pancreatitis who presents with fevers and fatigue.    Plan:  -Agree with plan for cystgastrostomy once TB ruled out.   -Continue current diet, patient without pain and abdomen non-peritoneal.   -Rest of care as per primary team.     Blue Team General Surgery [phone]

## 2018-10-03 NOTE — PROGRESS NOTE ADULT - SUBJECTIVE AND OBJECTIVE BOX
Surgery Progress Note     Subjective/24hour Events:   Patient seen and examined.  No acute events overnight.   Pain well controlled.   Tolerating diet without N/V.     Vital Signs:  Vital Signs Last 24 Hrs  T(C): 36.9 (03 Oct 2018 13:05), Max: 37.7 (02 Oct 2018 16:37)  T(F): 98.4 (03 Oct 2018 13:05), Max: 99.8 (02 Oct 2018 16:37)  HR: 71 (03 Oct 2018 13:05) (60 - 71)  BP: 95/65 (03 Oct 2018 13:05) (95/65 - 137/81)  BP(mean): --  RR: 18 (03 Oct 2018 13:05) (18 - 18)  SpO2: 99% (03 Oct 2018 13:05) (97% - 99%)    CAPILLARY BLOOD GLUCOSE          I&O's Detail    02 Oct 2018 07:01  -  03 Oct 2018 07:00  --------------------------------------------------------  IN:    Oral Fluid: 200 mL    sodium chloride 0.9%.: 1000 mL  Total IN: 1200 mL    OUT:    Voided: 4150 mL  Total OUT: 4150 mL    Total NET: -2950 mL      03 Oct 2018 07:01  -  03 Oct 2018 15:29  --------------------------------------------------------  IN:    Oral Fluid: 220 mL  Total IN: 220 mL    OUT:    Voided: 550 mL  Total OUT: 550 mL    Total NET: -330 mL          MEDICATIONS  (STANDING):  heparin  Injectable 5000 Unit(s) SubCutaneous every 12 hours  influenza   Vaccine 0.5 milliLiter(s) IntraMuscular once  lactated ringers. 1000 milliLiter(s) (125 mL/Hr) IV Continuous <Continuous>  pantoprazole    Tablet 40 milliGRAM(s) Oral before breakfast  sodium chloride 0.9%. 1000 milliLiter(s) (150 mL/Hr) IV Continuous <Continuous>    MEDICATIONS  (PRN):      Physical Exam:  Gen: NAD, laying comfortably in bed, converses easily.   Lungs: Non labored breathing.   Ab: Soft, nontender, nondistended.   Ext: Moves all 4 spontaneously.     Labs:    10-03    139  |  103  |  <4<L>  ----------------------------<  90  4.0   |  25  |  0.76    Ca    8.0<L>      03 Oct 2018 07:27                              9.7    9.26  )-----------( 563      ( 03 Oct 2018 08:45 )             30.6     PT/INR - ( 03 Oct 2018 08:45 )   PT: 14.4 sec;   INR: 1.27 ratio         PTT - ( 03 Oct 2018 08:45 )  PTT:32.5 sec

## 2018-10-04 LAB
ANION GAP SERPL CALC-SCNC: 8 MMOL/L — SIGNIFICANT CHANGE UP (ref 5–17)
BUN SERPL-MCNC: 4 MG/DL — LOW (ref 7–23)
CALCIUM SERPL-MCNC: 8 MG/DL — LOW (ref 8.4–10.5)
CHLORIDE SERPL-SCNC: 103 MMOL/L — SIGNIFICANT CHANGE UP (ref 96–108)
CO2 SERPL-SCNC: 25 MMOL/L — SIGNIFICANT CHANGE UP (ref 22–31)
CREAT SERPL-MCNC: 0.69 MG/DL — SIGNIFICANT CHANGE UP (ref 0.5–1.3)
GLUCOSE SERPL-MCNC: 97 MG/DL — SIGNIFICANT CHANGE UP (ref 70–99)
HCT VFR BLD CALC: 30 % — LOW (ref 39–50)
HGB BLD-MCNC: 9 G/DL — LOW (ref 13–17)
MCHC RBC-ENTMCNC: 26.1 PG — LOW (ref 27–34)
MCHC RBC-ENTMCNC: 30 GM/DL — LOW (ref 32–36)
MCV RBC AUTO: 87 FL — SIGNIFICANT CHANGE UP (ref 80–100)
PLATELET # BLD AUTO: 614 K/UL — HIGH (ref 150–400)
POTASSIUM SERPL-MCNC: 3.4 MMOL/L — LOW (ref 3.5–5.3)
POTASSIUM SERPL-SCNC: 3.4 MMOL/L — LOW (ref 3.5–5.3)
RBC # BLD: 3.45 M/UL — LOW (ref 4.2–5.8)
RBC # FLD: 16.7 % — HIGH (ref 10.3–14.5)
SODIUM SERPL-SCNC: 136 MMOL/L — SIGNIFICANT CHANGE UP (ref 135–145)
WBC # BLD: 12.06 K/UL — HIGH (ref 3.8–10.5)
WBC # FLD AUTO: 12.06 K/UL — HIGH (ref 3.8–10.5)

## 2018-10-04 PROCEDURE — 99233 SBSQ HOSP IP/OBS HIGH 50: CPT

## 2018-10-04 RX ORDER — PREGABALIN 225 MG/1
1000 CAPSULE ORAL DAILY
Qty: 0 | Refills: 0 | Status: DISCONTINUED | OUTPATIENT
Start: 2018-10-04 | End: 2018-10-11

## 2018-10-04 RX ADMIN — PANTOPRAZOLE SODIUM 40 MILLIGRAM(S): 20 TABLET, DELAYED RELEASE ORAL at 05:58

## 2018-10-04 RX ADMIN — HEPARIN SODIUM 5000 UNIT(S): 5000 INJECTION INTRAVENOUS; SUBCUTANEOUS at 05:58

## 2018-10-04 RX ADMIN — HEPARIN SODIUM 5000 UNIT(S): 5000 INJECTION INTRAVENOUS; SUBCUTANEOUS at 17:19

## 2018-10-04 NOTE — DIETITIAN INITIAL EVALUATION ADULT. - NS AS NUTRI INTERV MEALS SNACK
Continue to provide current diet order, no therapeutic diet restrictions indicated at this time. Obtain/honor food preferences as feasible./General/healthful diet

## 2018-10-04 NOTE — DIETITIAN INITIAL EVALUATION ADULT. - ORAL INTAKE PTA
Pt has had multiple recent hospitalizations since 07/2018 and has been placed on NPO/clear liquid restrictions for several days on each admit with brief periods of regular po diet between. Pt now experiences early satiety at meals and consumes small amounts. NKFA reported, other than new gluten intolerance since 7/2018, coinciding with first episode pancreatitis. No vitamin/mineral/herbal supplementation PTA reported./poor

## 2018-10-04 NOTE — PROGRESS NOTE ADULT - ASSESSMENT
pt w hx recurrent pancreatitis w/ fever /leukocytosis /r/o sepsis  surg eval appreciated  pt reports had bx in [facility], no malignancy  possibly repeat bx/drainage, GI to comment  PV thrombus on MRI, however, US w/o thrombus  eventual EUS with possible cystgastrostomy/PD stenting  Hb stable s/p PRBC, monitor  GI following  FOBT  PPI  clears  iv fluids  dvt proph  ppis  id eval noted  f/u cultures  AFB low yeild as pt not coughing  await CT chest to eval for any pulm disease, if present - possibly bronch  monitor off abx per ID

## 2018-10-04 NOTE — CHART NOTE - NSCHARTNOTEFT_GEN_A_CORE
Upon Nutritional Assessment by the Registered Dietitian your patient was determined to meet criteria / has evidence of the following diagnosis/diagnoses:          [ ]  Mild Protein Calorie Malnutrition        [ ]  Moderate Protein Calorie Malnutrition        [x] Severe Protein Calorie Malnutrition        [ ] Unspecified Protein Calorie Malnutrition        [ ] Underweight / BMI <19        [ ] Morbid Obesity / BMI > 40      Findings as based on:  [x] Comprehensive nutrition assessment   [x] Nutrition Focused Physical Exam: severe muscle wasting, fat loss  [x] Other: pt with hx of prolonged inadequate diet, 32 lb. (21.3%) wt loss x 3 months, BMI 19.0      Nutrition Plan/Recommendations:    1. Continue to provide current diet order, no therapeutic diet restrictions indicated at this time.   2. Provide 3 pkt/daily prosource (to provide additional 180kcal, 45g protein)   3. Encourage po intake as feasible  4. Add vitamin B12 + multivitamin  5. Recommend consider pancreatic enzyme replacement therapy as medically indicated  6. Will continue to monitor po intake, wt, labs, f/u per protocol     RD remains available. Renetta Watts RD, Pager: [phone]     PROVIDER Section:     By signing this assessment you are acknowledging and agree with the diagnosis/diagnoses assigned by the Registered Dietitian    Comments: Upon Nutritional Assessment by the Registered Dietitian your patient was determined to meet criteria / has evidence of the following diagnosis/diagnoses:          [ ]  Mild Protein Calorie Malnutrition        [ ]  Moderate Protein Calorie Malnutrition        [x] Severe Protein Calorie Malnutrition        [ ] Unspecified Protein Calorie Malnutrition        [ ] Underweight / BMI <19        [ ] Morbid Obesity / BMI > 40      Findings as based on:  [x] Comprehensive nutrition assessment   [x] Nutrition Focused Physical Exam: severe muscle wasting, fat loss  [x] Other: pt with hx of prolonged inadequate diet, 32 lb. (21.3%) wt loss x 3 months, BMI 19.0      Nutrition Plan/Recommendations:    1. Continue to provide current diet order, no therapeutic diet restrictions indicated at this time.   2. Provide 3 pkt/daily prosource (to provide additional 180kcal, 45g protein)   3. Encourage po intake as feasible  4. Add vitamin B12 + multivitamin  5. Recommend consider pancreatic enzyme replacement therapy as medically indicated  6. Will continue to monitor po intake, wt, labs, f/u per protocol     RD remains available. Renetta Watts RD, Pager: [phone]    PROVIDER Section:     By signing this assessment you are acknowledging and agree with the diagnosis/diagnoses assigned by the Registered Dietitian    Comments:

## 2018-10-04 NOTE — PROGRESS NOTE ADULT - ASSESSMENT
Impression:  1. 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 due to gallbladder sludge. Patient has pancreatic duct dilation as well.    2. Walled off pancreatic necrosis    3. Rule out pulmonary TB    4. Peritoneal nodules seen on outside CT (scanned to [employer])    Recommendation:  -Dr. Hniojosa requesting PD stenting, will d/w Dr. Harmon  -check IgG4, MICHELLE  -obtain outside radiology discs    Hilario Christopher M.D.   [facility]  Contact: [phone] Impression:  1. 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 due to gallbladder sludge. Patient has pancreatic duct dilation as well.    2. Fluid collection: ddx includes abscess vs. pseudocyst. If abscess will require drainage, if this is only a collection related to PD obstruction then the ideal course may be to stent the PD.     3. Rule out pulmonary TB    4. Peritoneal nodules seen on outside CT (scanned to [employer])    Recommendation:  -will discuss drainage vs. PD stenting with Dr. Hinojosa  -check IgG4, MICHELLE  -obtain outside radiology discs    Hilario Christopher M.D.   [facility]  Contact: [phone] Impression:  1. 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 due to gallbladder sludge. Patient has pancreatic duct dilation as well.    2. Fluid collection: ddx includes abscess vs. pseudocyst/WOPN . If abscess will require drainage, if this is only a collection related to PD obstruction then the ideal course may be to stent the PD.     3. Rule out pulmonary TB    4. Peritoneal nodules seen on outside CT (scanned to [employer])    Recommendation:  -will discuss drainage vs. PD stenting with Dr. Hinojosa  -check IgG4, MICHELLE  -obtain outside radiology discs    Hilario Christopher M.D.   [facility]  Contact: [phone]

## 2018-10-04 NOTE — PROGRESS NOTE ADULT - SUBJECTIVE AND OBJECTIVE BOX
Patient is a 67y old  Male who presents with a chief complaint of Pancreatitis (04 Oct 2018 06:43)      SUBJECTIVE / OVERNIGHT EVENTS:  no events  MEDICATIONS  (STANDING):  heparin  Injectable 5000 Unit(s) SubCutaneous every 12 hours  influenza   Vaccine 0.5 milliLiter(s) IntraMuscular once  lactated ringers. 1000 milliLiter(s) (125 mL/Hr) IV Continuous <Continuous>  pantoprazole    Tablet 40 milliGRAM(s) Oral before breakfast  sodium chloride 0.9%. 1000 milliLiter(s) (150 mL/Hr) IV Continuous <Continuous>    MEDICATIONS  (PRN):              PHYSICAL EXAM:  GENERAL: NAD, well-developed  HEAD:  Atraumatic, Normocephalic  EYES: EOMI, PERRLA, conjunctiva and sclera anicteric  NECK: Supple, No JVD  CHEST/LUNG: Clear to auscultation bilaterally; No wheeze  HEART: Regular rate and rhythm; No murmurs, rubs, or gallops  ABDOMEN: Soft, Nontender, Nondistended; Bowel sounds present, no hepatosplenomegaly, no rebound or guarding  EXTREMITIES:  2+ Peripheral Pulses, No clubbing, cyanosis, or edema  PSYCH: AAOx3  NEUROLOGY: non-focal, no asterixis  SKIN: No rashes or lesion    LABS:                        9.0    12.06 )-----------( 614      ( 04 Oct 2018 09:20 )             30.0     10-04    136  |  103  |  4<L>  ----------------------------<  97  3.4<L>   |  25  |  0.69    Ca    8.0<L>      04 Oct 2018 07:12        PT/INR - ( 03 Oct 2018 08:45 )   PT: 14.4 sec;   INR: 1.27 ratio         PTT - ( 03 Oct 2018 08:45 )  PTT:32.5 sec          RADIOLOGY & ADDITIONAL TESTS:

## 2018-10-04 NOTE — DIETITIAN INITIAL EVALUATION ADULT. - FACTORS AFF FOOD INTAKE
pain/persistent nausea/early satiety; pt with GI symptoms related to pancreatitis reported PTA including pain and nausea. On current assessment pt reports no N/V/D/C, no pain, denies chewing/swallowing difficulties.

## 2018-10-04 NOTE — DIETITIAN INITIAL EVALUATION ADULT. - ENERGY NEEDS
Height: 66 inches, Weight: 117.9 pounds (dosing, 9/28)  BMI: 19.0 kg/m2 IBW: 142 pounds (+/-10%), %IBW: 83%  Pertinent Info: No edema noted, no pressure injuries noted at this time in nursing flow sheet.  Other pertinent info: Pt is 67yoM p/w fever and PMH of chronic pancreatitis of unknown etiology. Pt noted with pancreatic duct dilation, walled off pancreatic necrosis. Pending pancreatic stent. Height: 66 inches, Weight: 117.9 pounds (dosing, 9/28)  BMI: 19.0 kg/m2 IBW: 142 pounds (+/-10%), %IBW: 83%  Pertinent Info: No edema noted, no pressure injuries noted at this time in nursing flow sheet.  Other pertinent info: Pt is 67yoM p/w fever and PMH of chronic pancreatitis of unknown etiology. Pt noted with pancreatic duct dilation, walled off pancreatic necrosis. Pending pancreatic stent. Pt noted with hypokalemia 10/4.

## 2018-10-04 NOTE — DIETITIAN INITIAL EVALUATION ADULT. - NS AS NUTRI INTERV COLLABORAT
Discussed nutrition recommendations and supplementation with MD. Of note, pt with new gluten intolerance since first pancreatitis episode. Recommend consider need for pancreatic enzyme replacement therapy. Discussed nutrition recommendations and supplementation with medicine team. Of note, pt with new gluten intolerance since first pancreatitis episode. Recommend consider need for pancreatic enzyme replacement therapy.

## 2018-10-04 NOTE — PROGRESS NOTE ADULT - SUBJECTIVE AND OBJECTIVE BOX
Patient is a 67y old  Male who presents with a chief complaint of fevers (03 Oct 2018 13:15)    Being followed by ID for S/P fever, pancreatitis    Interval history:  Afebrile off antibiotics  No acute events      ROS:  feels better  No cough, SOB, CP  No N/V/D./abd pain  No other complaints      Antimicrobials:        Vital Signs Last 24 Hrs  T(C): 37.2 (10-04-18 @ 05:00), Max: 37.5 (10-03-18 @ 16:59)  T(F): 98.9 (10-04-18 @ 05:00), Max: 99.5 (10-03-18 @ 16:59)  HR: 66 (10-04-18 @ 05:00) (64 - 75)  BP: 135/79 (10-04-18 @ 05:00) (95/65 - 135/79)  BP(mean): --  RR: 18 (10-04-18 @ 05:00) (18 - 18)  SpO2: 98% (10-04-18 @ 05:00) (97% - 99%)    Physical Exam:    Constitutional Thin NAD    HEENT EOMI, No pallor or icterus    No oral exudate or erythema    Neck supple no JVD or LN    Chest Clear to auscultation    CVS S1 S2 WNl No murmur or rub or gallop    Abd soft BS normal No tenderness no masses    Ext No cyanosis clubbing or edema    IV site no erythema tenderness or discharge    Joints no swelling or LOM    CNS non focal    Lab Data:                          9.7    9.26  )-----------( 563      ( 03 Oct 2018 08:45 )             30.6       10-03    139  |  103  |  <4<L>  ----------------------------<  90  4.0   |  25  |  0.76    Ca    8.0<L>      03 Oct 2018 07:27          .Sputum Sputum  10-01-18   Moderate Acinetobacter baumannii complex  Normal Respiratory Vidya present  --  Acinetobacter baumannii      .Urine Clean Catch (Midstream)  09-28-18   No growth  --  --      .Blood Blood-Peripheral  09-28-18   No growth at 5 days.  --  --    < from: Xray Chest 1 View- PORTABLE-Routine (09.28.18 @ 16:27) >  EXAM:  XR CHEST PORTABLE ROUTINE 1V                            PROCEDURE DATE:  09/28/2018            INTERPRETATION:  EXAMINATION: XR CHEST PORTABLE ROUTINE 1V    CLINICAL INDICATION: sepsis    TECHNIQUE: Single portable view of the chest was obtained.    COMPARISON: None.    FINDINGS:     The heart is normal in size. There are no focal pulmonary consolidations   or pneumothorax. There is mild blunting of the left costophrenic angle,   which may be secondary to trace pleural fluid versus pleural thickening.    IMPRESSION:   Possible trace left pleural fluid.        < end of copied text >

## 2018-10-04 NOTE — PROGRESS NOTE ADULT - SUBJECTIVE AND OBJECTIVE BOX
Surgery Progress Note     Subjective/24hour Events:   Patient seen and examined.  No acute events overnight.   Pain well controlled.       Vital Signs:  Vital Signs Last 24 Hrs  T(C): 37.2 (04 Oct 2018 05:00), Max: 37.5 (03 Oct 2018 16:59)  T(F): 98.9 (04 Oct 2018 05:00), Max: 99.5 (03 Oct 2018 16:59)  HR: 66 (04 Oct 2018 05:00) (66 - 75)  BP: 135/79 (04 Oct 2018 05:00) (95/65 - 135/79)  BP(mean): --  RR: 18 (04 Oct 2018 05:00) (18 - 18)  SpO2: 98% (04 Oct 2018 05:00) (97% - 99%)    CAPILLARY BLOOD GLUCOSE          I&O's Detail    03 Oct 2018 07:01  -  04 Oct 2018 07:00  --------------------------------------------------------  IN:    Oral Fluid: 780 mL    sodium chloride 0.9%.: 1000 mL  Total IN: 1780 mL    OUT:    Voided: 3450 mL  Total OUT: 3450 mL    Total NET: -1670 mL          MEDICATIONS  (STANDING):  heparin  Injectable 5000 Unit(s) SubCutaneous every 12 hours  influenza   Vaccine 0.5 milliLiter(s) IntraMuscular once  lactated ringers. 1000 milliLiter(s) (125 mL/Hr) IV Continuous <Continuous>  pantoprazole    Tablet 40 milliGRAM(s) Oral before breakfast  sodium chloride 0.9%. 1000 milliLiter(s) (150 mL/Hr) IV Continuous <Continuous>    MEDICATIONS  (PRN):      Physical Exam:  Gen: NAD, laying comfortably in bed, converses easily.   Lungs: Non labored breathing.   Ab: Soft, nontender, nondistended.   Ext: Moves all 4 spontaneously.     Labs:    10-04    136  |  103  |  4<L>  ----------------------------<  97  3.4<L>   |  25  |  0.69    Ca    8.0<L>      04 Oct 2018 07:12                              9.7    9.26  )-----------( 563      ( 03 Oct 2018 08:45 )             30.6     PT/INR - ( 03 Oct 2018 08:45 )   PT: 14.4 sec;   INR: 1.27 ratio         PTT - ( 03 Oct 2018 08:45 )  PTT:32.5 sec    Imaging:

## 2018-10-04 NOTE — DIETITIAN INITIAL EVALUATION ADULT. - OTHER INFO
Pt seen on [facility] for length of stay. Son served as  for pt and reports pt with poor/fair po intake since started receiving regular diet order x 2 days ago, consuming ~50% or less of meals. Pt was previously on clear liquid diet x 6 days on current admit. Son reports pt with early satiety as he is now used to eating very little. Pt with ~32 lb wt loss x 3 months since first pancreatitis event. NFPE findings above. Discussed with son encouraging po intake as feasible per pt tolerance. Pt amenable to protein supplement, declines ensure enlive supplementation. Pt declines adding additional food preferences. Pt and family made aware RD remains available.

## 2018-10-04 NOTE — DIETITIAN INITIAL EVALUATION ADULT. - PHYSICAL APPEARANCE
other (specify)/Pt appears thin, BMI 19.0. Nutrition Focused Physical Exam performed with pt's verbal consent with the following findings: severe muscle wasting to temporal, clavicle, acromion process, interosseous, patella, calves; moderate fat loss to orbital. Son reports he has noticed significant decline in pt muscle tone, pt used to carry out weight bearing activities and can no longer do so. Decreased physical activity.

## 2018-10-04 NOTE — PROGRESS NOTE ADULT - ASSESSMENT
67 year old man with h/o recurrent pancreatitis who presents with fevers and fatigue.    Plan:  -Plan for cystgastrostomy once TB ruled out.   -Continue current diet.  -Rest of care as per primary team.     Blue Team General Surgery [phone] 67 year old man with h/o recurrent pancreatitis who presents with fevers and fatigue.    Plan:  -Recommend transpapillary pancreatic stent for pancreatic stricture.   -Continue current diet.  -Rest of care as per primary team.     Blue Team General Surgery [phone]

## 2018-10-04 NOTE — PROGRESS NOTE ADULT - ASSESSMENT
67M from [place] (in [place]) admitted with fever, leucocytosis and meeting "sepsis" criteria. His PMH is remarkable for recurrent/persistent pancreatitis since July 2018. He was initially admitted to [place] with pancreatitis complicated by abscess versus pseudocyst. Subsequently he has had 2-3 admissions at [facility] for recurrent episodes of abdominal pain (related to the pancreatic fluid collection- not necessarily pancreatitis) and has received antibiotics (one course up to 3 weeks- which son believes was ertapenem). He has had W/U including EUS. Abdominal imaging has shown peritoneal nodules, without lymphadenopathy and fluid collection is complex and may include a mass. No definitive etiology of the pancreatic disease has been established, malignancy is being addressed. The presentation is atypical for primary pancreatic Ca, differential may include intraductal lesions or possible infection. TB can cause peritoneal disease, and while he is from an endemic area, this would be an atypical presentation (no fever, adenopathy, scant ascites, no obvious ileocecal or lung disease).   MRI abdomen shows acute on chronic pancreatitis, peripancreatic fluid (possible pseudocyst), no necrosis, some adrenal nodules, no peritoneal nodules, suggested portal vein thrombosis, not confirmed by ultrasound.  Quantiferon gold- poor mitogen response, so not interpretable- NOT indicative of TB. Prior work-up and evaluations conducted at [facility] apparantly included a negative biopsy by report  Likely fever and elevated white count most likely due to acute on chronic pancreatitis/pancreatic inflammation, no necrosis on MRI, clinically improved, BCs negative  S/P zosyn,   Recommend   Afebrile, continue to observe off antibiotics   QTF is non-diagnostic- not due to elevated TB response but due to poor mitogen response, that is NOT indicative or suggestive of TB.    CXR without infiltrate, only trace pleural fluid (nonspecific), no cough. Intraabdominal nodules are undiagnosed, malignancy versus TB versus fungal or other etiology. MRI with no abdominal lymphadenopathy- also not supportive of TB as etiology. Best method of diagnosis would be biopsy, yield of sputum AFB would be low as patient not coughing and no phlegm.  Check CT chest to determine any active pulmonary disease, if present may need bronchoscopy, if not can proceed with planned ERCP.  -Advance diet as tolerates.    ID is available at [phone]    Srinath Cassidy MD  pager [phone]  office [phone] 67M from [place] (in [place] years) admitted with fever, leucocytosis and meeting "sepsis" criteria. His PMH is remarkable for recurrent/persistent pancreatitis since July 2018. He was initially admitted to [place] with pancreatitis complicated by abscess versus pseudocyst. Subsequently he has had 2-3 admissions at [facility] for recurrent episodes of abdominal pain (related to the pancreatic fluid collection- not necessarily pancreatitis) and has received antibiotics (one course up to 3 weeks- which son believes was ertapenem). He has had W/U including EUS. Abdominal imaging has shown peritoneal nodules, without lymphadenopathy and fluid collection is complex and may include a mass. No definitive etiology of the pancreatic disease has been established, malignancy is being addressed. The presentation is atypical for primary pancreatic Ca, differential may include intraductal lesions or possible infection. TB can cause peritoneal disease, and while he is from an endemic area, this would be an atypical presentation (no fever, adenopathy, scant ascites, no obvious ileocecal or lung disease).   MRI abdomen shows acute on chronic pancreatitis, peripancreatic fluid (possible pseudocyst), no necrosis, some adrenal nodules, no peritoneal nodules, suggested portal vein thrombosis, not confirmed by ultrasound.  Quantiferon gold- poor mitogen response, so not interpretable- NOT indicative of TB. Prior work-up and evaluations conducted at [facility] apparantly included a negative biopsy by report  Likely fever and elevated white count most likely due to acute on chronic pancreatitis/pancreatic inflammation, no necrosis on MRI, clinically improved, BCs negative  S/P zosyn,   Recommend   Afebrile, continue to observe off antibiotics   QTF is non-diagnostic- not due to elevated TB response but due to poor mitogen response, that is NOT indicative or suggestive of TB.    CXR without infiltrate, only trace pleural fluid (nonspecific), no cough. Intraabdominal nodules are undiagnosed, malignancy versus TB versus fungal or other etiology. MRI with no abdominal lymphadenopathy- also not supportive of TB as etiology. Best method of diagnosis would be biopsy, yield of sputum AFB would be low as patient not coughing and no phlegm.  Check CT chest to determine any active pulmonary disease, if present may need bronchoscopy, if not can proceed with planned ERCP. Spoke at length with patient and his son (as ), addressed his concern that he has had so many scans over the past few months, he is agreeable to proceed with planned CT chest.  -Advance diet as tolerates.    ID is available at [phone]    Srinath Cassidy MD  pager [phone]  office [phone]

## 2018-10-05 LAB
ANION GAP SERPL CALC-SCNC: 11 MMOL/L — SIGNIFICANT CHANGE UP (ref 5–17)
BUN SERPL-MCNC: 6 MG/DL — LOW (ref 7–23)
CALCIUM SERPL-MCNC: 8.4 MG/DL — SIGNIFICANT CHANGE UP (ref 8.4–10.5)
CHLORIDE SERPL-SCNC: 100 MMOL/L — SIGNIFICANT CHANGE UP (ref 96–108)
CO2 SERPL-SCNC: 25 MMOL/L — SIGNIFICANT CHANGE UP (ref 22–31)
CREAT SERPL-MCNC: 0.73 MG/DL — SIGNIFICANT CHANGE UP (ref 0.5–1.3)
GLUCOSE SERPL-MCNC: 92 MG/DL — SIGNIFICANT CHANGE UP (ref 70–99)
HCT VFR BLD CALC: 31.5 % — LOW (ref 39–50)
HGB BLD-MCNC: 9.7 G/DL — LOW (ref 13–17)
MCHC RBC-ENTMCNC: 26.9 PG — LOW (ref 27–34)
MCHC RBC-ENTMCNC: 30.8 GM/DL — LOW (ref 32–36)
MCV RBC AUTO: 87.5 FL — SIGNIFICANT CHANGE UP (ref 80–100)
NIGHT BLUE STAIN TISS: SIGNIFICANT CHANGE UP
PLATELET # BLD AUTO: 664 K/UL — HIGH (ref 150–400)
POTASSIUM SERPL-MCNC: 3.3 MMOL/L — LOW (ref 3.5–5.3)
POTASSIUM SERPL-SCNC: 3.3 MMOL/L — LOW (ref 3.5–5.3)
RBC # BLD: 3.6 M/UL — LOW (ref 4.2–5.8)
RBC # FLD: 16.7 % — HIGH (ref 10.3–14.5)
SODIUM SERPL-SCNC: 136 MMOL/L — SIGNIFICANT CHANGE UP (ref 135–145)
SPECIMEN SOURCE: SIGNIFICANT CHANGE UP
WBC # BLD: 13.28 K/UL — HIGH (ref 3.8–10.5)
WBC # FLD AUTO: 13.28 K/UL — HIGH (ref 3.8–10.5)

## 2018-10-05 PROCEDURE — 99232 SBSQ HOSP IP/OBS MODERATE 35: CPT

## 2018-10-05 PROCEDURE — 99232 SBSQ HOSP IP/OBS MODERATE 35: CPT | Mod: GC

## 2018-10-05 RX ORDER — SODIUM CHLORIDE 9 MG/ML
250 INJECTION INTRAMUSCULAR; INTRAVENOUS; SUBCUTANEOUS ONCE
Qty: 0 | Refills: 0 | Status: COMPLETED | OUTPATIENT
Start: 2018-10-05 | End: 2018-10-05

## 2018-10-05 RX ORDER — POTASSIUM CHLORIDE 20 MEQ
40 PACKET (EA) ORAL ONCE
Qty: 0 | Refills: 0 | Status: COMPLETED | OUTPATIENT
Start: 2018-10-05 | End: 2018-10-05

## 2018-10-05 RX ADMIN — PREGABALIN 1000 MICROGRAM(S): 225 CAPSULE ORAL at 13:58

## 2018-10-05 RX ADMIN — HEPARIN SODIUM 5000 UNIT(S): 5000 INJECTION INTRAVENOUS; SUBCUTANEOUS at 06:20

## 2018-10-05 RX ADMIN — Medication 1 TABLET(S): at 13:58

## 2018-10-05 RX ADMIN — Medication 40 MILLIEQUIVALENT(S): at 13:58

## 2018-10-05 RX ADMIN — HEPARIN SODIUM 5000 UNIT(S): 5000 INJECTION INTRAVENOUS; SUBCUTANEOUS at 17:52

## 2018-10-05 RX ADMIN — SODIUM CHLORIDE 250 MILLILITER(S): 9 INJECTION INTRAMUSCULAR; INTRAVENOUS; SUBCUTANEOUS at 22:59

## 2018-10-05 RX ADMIN — PANTOPRAZOLE SODIUM 40 MILLIGRAM(S): 20 TABLET, DELAYED RELEASE ORAL at 06:20

## 2018-10-05 NOTE — PROGRESS NOTE ADULT - ASSESSMENT
Impression:  1. 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 due to gallbladder sludge. Patient has pancreatic duct dilation as well.    2. Fluid collection: ddx includes abscess vs. pseudocyst/WOPN . If abscess will require drainage, if this is only a collection related to PD obstruction then the ideal course may be to stent the PD.  WBC uptrending since off abx      3. Rule out pulmonary TB: pt cleared to come off isolation per ID    4. Peritoneal nodules seen on outside CT (scanned to [employer])    Recommendation:  -will discuss drainage vs. PD stenting with Dr. Hinojosa  -  -obtain outside radiology discs    Hilario Christopher M.D.   [facility]  Contact: [phone]

## 2018-10-05 NOTE — PROGRESS NOTE ADULT - ASSESSMENT
67M from [place] (in [place]) admitted with fever, leucocytosis and meeting "sepsis" criteria. His PMH is remarkable for recurrent/persistent pancreatitis since July 2018. He was initially admitted to [place] with pancreatitis complicated by abscess versus pseudocyst. Subsequently he has had 2-3 admissions at [facility] for recurrent episodes of abdominal pain (related to the pancreatic fluid collection- not necessarily pancreatitis) and has received antibiotics (one course up to 3 weeks- which son believes was ertapenem). He has had W/U including EUS. Abdominal imaging has shown peritoneal nodules, without lymphadenopathy and fluid collection is complex and may include a mass. No definitive etiology of the pancreatic disease has been established, malignancy is being addressed. The presentation is atypical for primary pancreatic Ca, differential may include intraductal lesions or possible infection. TB can cause peritoneal disease, and while he is from an endemic area, this would be an atypical presentation (no fever, adenopathy, scant ascites, no obvious ileocecal or lung disease).   MRI abdomen shows acute on chronic pancreatitis, peripancreatic fluid (possible pseudocyst), no necrosis, some adrenal nodules, no peritoneal nodules, suggested portal vein thrombosis, not confirmed by ultrasound.  Quantiferon gold- poor mitogen response, so not interpretable- NOT indicative of TB. Prior work-up and evaluations conducted at [facility] apparantly included a negative biopsy by report  Likely fever and elevated white count most likely due to acute on chronic pancreatitis/pancreatic inflammation, no necrosis on MRI, clinically improved, BCs negative  S/P zosyn,   Recommend   Afebrile, continue to observe off antibiotics   QTF is non-diagnostic- not due to elevated TB response but due to poor mitogen response, that is NOT indicative or suggestive of TB.    CXR without infiltrate, only trace pleural fluid (nonspecific), no cough. Intraabdominal nodules are undiagnosed, malignancy versus TB versus fungal or other etiology. MRI with no abdominal lymphadenopathy- also not supportive of TB as etiology. Best method of diagnosis would be biopsy, yield of sputum AFB would be low as patient not coughing and no phlegm.  Check CT chest to determine any active pulmonary disease, if present may need bronchoscopy, if not can proceed with planned Cystgastroscopy with possible PD stenting as per GI. Spoke at length with patient and his son (as ), addressed his concern that he has had so many scans over the past few months, he is agreeable to proceed with planned CT chest.  WBC increased, monitor trend.  Observe off antibiotics.  -Advance diet as tolerates.    I will be away after today, My colleagues will follow: [phone]    Srinath Cassidy MD  pager [phone]  office [phone] 67M from [place] (in [place]) admitted with fever, leucocytosis and meeting "sepsis" criteria. His PMH is remarkable for recurrent/persistent pancreatitis since July 2018. He was initially admitted to [place] with pancreatitis complicated by abscess versus pseudocyst. Subsequently he has had 2-3 admissions at [facility] for recurrent episodes of abdominal pain (related to the pancreatic fluid collection- not necessarily pancreatitis) and has received antibiotics (one course up to 3 weeks- which son believes was ertapenem). He has had W/U including EUS. Abdominal imaging has shown peritoneal nodules, without lymphadenopathy and fluid collection is complex and may include a mass. No definitive etiology of the pancreatic disease has been established, malignancy is being addressed. The presentation is atypical for primary pancreatic Ca, differential may include intraductal lesions or possible infection. TB can cause peritoneal disease, and while he is from an endemic area, this would be an atypical presentation (no fever, adenopathy, scant ascites, no obvious ileocecal or lung disease).   MRI abdomen shows acute on chronic pancreatitis, peripancreatic fluid (possible pseudocyst), no necrosis, some adrenal nodules, no peritoneal nodules, suggested portal vein thrombosis, not confirmed by ultrasound.  Quantiferon gold- poor mitogen response, so not interpretable- NOT indicative of TB. Prior work-up and evaluations conducted at [facility] apparantly included a negative biopsy by report  Likely fever and elevated white count most likely due to acute on chronic pancreatitis/pancreatic inflammation, no necrosis on MRI, clinically improved, BCs negative  S/P zosyn,   Recommend   Afebrile, continue to observe off antibiotics   QTF is non-diagnostic- not due to elevated TB response but due to poor mitogen response, that is NOT indicative or suggestive of TB.    CXR without infiltrate, only trace pleural fluid (nonspecific), no cough. Intraabdominal nodules are undiagnosed, malignancy versus TB versus fungal or other etiology. MRI with no abdominal lymphadenopathy- also not supportive of TB as etiology. Best method of diagnosis would be biopsy, yield of sputum AFB would be low as patient not coughing and no phlegm.  As no evidence of pulmonary disease, would not push for  CT chest as this would delay his necessary GI procedure.  No need for isolation. Can proceed with planned EUS with possible PD stenting as per GI. WBC increased, some sweats today, monitor trend.  Observe off antibiotics.  -Advance diet as tolerates.    I will be away after today, My colleagues will follow: [phone]    Srinath Cassidy MD  pager [phone]  office [phone]

## 2018-10-05 NOTE — PROGRESS NOTE ADULT - ASSESSMENT
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

## 2018-10-05 NOTE — PROGRESS NOTE ADULT - SUBJECTIVE AND OBJECTIVE BOX
Patient is a 67y old  Male who presents with a chief complaint of fevers (04 Oct 2018 12:54)    Being followed by ID for acute on chronic pancreatitis    Interval history:  Afebrile  No cough  No acute events      ROS:  No cough, SOB, CP  No N/V/D./abd pain  No other complaints      Antimicrobials:        Vital Signs Last 24 Hrs  T(C): 37.1 (10-05-18 @ 04:30), Max: 37.6 (10-04-18 @ 16:11)  T(F): 98.7 (10-05-18 @ 04:30), Max: 99.6 (10-04-18 @ 16:11)  HR: 71 (10-05-18 @ 04:30) (69 - 79)  BP: 109/62 (10-05-18 @ 04:30) (104/72 - 115/71)  BP(mean): --  RR: 18 (10-05-18 @ 04:30) (16 - 18)  SpO2: 98% (10-05-18 @ 04:30) (96% - 100%)    Physical Exam:    Constitutional Thin, NAD    HEENT EOMI, No pallor or icterus    No oral exudate or erythema    Neck supple no JVD or LN    Chest Clear to auscultation    CVS S1 S2 WNl No murmur or rub or gallop    Abd soft BS normal No tenderness no masses    Ext No cyanosis clubbing or edema    IV site no erythema tenderness or discharge    Joints no swelling or LOM    CNS  non focal    Lab Data:                          9.0    12.06 )-----------( 614      ( 04 Oct 2018 09:20 )             30.0       10-04    136  |  103  |  4<L>  ----------------------------<  97  3.4<L>   |  25  |  0.69    Ca    8.0<L>      04 Oct 2018 07:12          .Sputum Sputum  10-01-18   Moderate Acinetobacter baumannii complex  Normal Respiratory Vidya present  --  Acinetobacter baumannii      .Urine Clean Catch (Midstream)  09-28-18   No growth  --  --      .Blood Blood-Peripheral  09-28-18   No growth at 5 days.  --  --        < from: MR Abdomen w/ IV Cont (09.30.18 @ 14:20) >  EXAM:  MR ABDOMEN IC                            PROCEDURE DATE:  09/30/2018            INTERPRETATION:  CLINICAL INFORMATION: Recurrent pancreatitis. Abdominal   pain for 3 months. Fever for 2 days. Evaluate for abscess/mass.    COMPARISON: None.    PROCEDURE:   MRI of the abdomen was performed with and without intravenous contrast.   3D and radial MRCP were performed.  6 cc of Gadavist were administered. 1.5 cc were discarded.    FINDINGS:    LOWER CHEST: Small bilateral pleural effusions withassociated   compressive atelectasis.    LIVER: Normal morphology. Subcentimeter segment VIII hemangioma.  BILE DUCTS: Normal caliber.  GALLBLADDER: Cholelithiasis.  SPLEEN: Within normal limits.  PANCREAS: Diffusely decreased T1 signal with delayed enhancement   consistent with history of chronic pancreatitis. Peripancreatic   inflammation particularly in the region of the pancreatic tail where   there is a 6.0 x 4.8 x 5.2 cm collection containing debris with   peripheral enhancement consistent with acute on chronic pancreatitis. No   evidence to suggest necrosis. The main pancreatic duct is mildly dilated   in the pancreatic tail however there is no definite evidence of duct   obstruction/cut off.  ADRENALS: Subcentimeter indeterminate leftadrenal nodules.  KIDNEYS/URETERS: Small bilateral renal cysts. No hydronephrosis.    VISUALIZED PORTIONS:    BOWEL: Within normal limits.   PERITONEUM: No ascites.  RETROPERITONEUM: No lymphadenopathy. Inflammatory changes with a   collection in theleft upper quadrant as above.  VESSELS: No evidence of arterial pseudoaneurysm. The main, right and left   portal veins are acutely thrombosed.   ABDOMINAL WALL: Within normal limits.      IMPRESSION:     Acute on chronic pancreatitis with a 6.0 cm peripherally enhancing   collection at the pancreatic tail with internal debris.    Acute thrombosis of the main, right and left portal veins.      < end of copied text > Patient is a 67y old  Male who presents with a chief complaint of fevers (04 Oct 2018 12:54)    Being followed by ID for acute on chronic pancreatitis    Interval history:  Afebrile  No cough  No acute events      ROS:  C/O sweats -back and neck  No cough, SOB, CP  No N/V/D./abd pain  No other complaints      Antimicrobials:        Vital Signs Last 24 Hrs  T(C): 37.1 (10-05-18 @ 04:30), Max: 37.6 (10-04-18 @ 16:11)  T(F): 98.7 (10-05-18 @ 04:30), Max: 99.6 (10-04-18 @ 16:11)  HR: 71 (10-05-18 @ 04:30) (69 - 79)  BP: 109/62 (10-05-18 @ 04:30) (104/72 - 115/71)  BP(mean): --  RR: 18 (10-05-18 @ 04:30) (16 - 18)  SpO2: 98% (10-05-18 @ 04:30) (96% - 100%)    Physical Exam:    Constitutional Thin, NAD    HEENT EOMI, No pallor or icterus    No oral exudate or erythema    Neck supple no JVD or LN    Chest Clear to auscultation    CVS S1 S2 WNl No murmur or rub or gallop    Abd soft BS normal No tenderness no masses    Ext No cyanosis clubbing or edema    IV site no erythema tenderness or discharge    Joints no swelling or LOM    CNS  non focal    Lab Data:                          9.0    12.06 )-----------( 614      ( 04 Oct 2018 09:20 )             30.0       10-04    136  |  103  |  4<L>  ----------------------------<  97  3.4<L>   |  25  |  0.69    Ca    8.0<L>      04 Oct 2018 07:12          .Sputum Sputum  10-01-18   Moderate Acinetobacter baumannii complex  Normal Respiratory Vidya present  --  Acinetobacter baumannii      .Urine Clean Catch (Midstream)  09-28-18   No growth  --  --      .Blood Blood-Peripheral  09-28-18   No growth at 5 days.  --  --        < from: MR Abdomen w/ IV Cont (09.30.18 @ 14:20) >  EXAM:  MR ABDOMEN IC                            PROCEDURE DATE:  09/30/2018            INTERPRETATION:  CLINICAL INFORMATION: Recurrent pancreatitis. Abdominal   pain for 3 months. Fever for 2 days. Evaluate for abscess/mass.    COMPARISON: None.    PROCEDURE:   MRI of the abdomen was performed with and without intravenous contrast.   3D and radial MRCP were performed.  6 cc of Gadavist were administered. 1.5 cc were discarded.    FINDINGS:    LOWER CHEST: Small bilateral pleural effusions withassociated   compressive atelectasis.    LIVER: Normal morphology. Subcentimeter segment VIII hemangioma.  BILE DUCTS: Normal caliber.  GALLBLADDER: Cholelithiasis.  SPLEEN: Within normal limits.  PANCREAS: Diffusely decreased T1 signal with delayed enhancement   consistent with history of chronic pancreatitis. Peripancreatic   inflammation particularly in the region of the pancreatic tail where   there is a 6.0 x 4.8 x 5.2 cm collection containing debris with   peripheral enhancement consistent with acute on chronic pancreatitis. No   evidence to suggest necrosis. The main pancreatic duct is mildly dilated   in the pancreatic tail however there is no definite evidence of duct   obstruction/cut off.  ADRENALS: Subcentimeter indeterminate leftadrenal nodules.  KIDNEYS/URETERS: Small bilateral renal cysts. No hydronephrosis.    VISUALIZED PORTIONS:    BOWEL: Within normal limits.   PERITONEUM: No ascites.  RETROPERITONEUM: No lymphadenopathy. Inflammatory changes with a   collection in theleft upper quadrant as above.  VESSELS: No evidence of arterial pseudoaneurysm. The main, right and left   portal veins are acutely thrombosed.   ABDOMINAL WALL: Within normal limits.      IMPRESSION:     Acute on chronic pancreatitis with a 6.0 cm peripherally enhancing   collection at the pancreatic tail with internal debris.    Acute thrombosis of the main, right and left portal veins.      < end of copied text >

## 2018-10-05 NOTE — PROGRESS NOTE ADULT - SUBJECTIVE AND OBJECTIVE BOX
CHIEF COMPLAINT:Patient is a 67y old  Male who presents with a chief complaint of Fever (01 Oct 2018 10:20)  no acute events    Allergies:  No Known Allergies      PAST MEDICAL & SURGICAL HISTORY:  Chronic pancreatitis, unspecified pancreatitis type  No significant past surgical history      FAMILY HISTORY:  No pertinent family history in first degree relatives      REVIEW OF SYSTEMS:  CONSTITUTIONAL: No fever, weight loss, less fatigue  EYES: No eye pain, visual disturbances, or discharge  NECK: No pain or stiffness  RESPIRATORY: No cough or wheezing, no shortness of breath  CARDIOVASCULAR: No chest pain, palpitations, dizziness, or leg swelling  GASTROINTESTINAL: No abdominal or epigastric pain. No nausea, vomiting, diarrhea or constipation  GENITOURINARY: No dysuria, urinary frequency or urgency, no hematuria  NEUROLOGICAL: No headaches, memory loss, loss of strength, numbness, or tremors  SKIN: No itching, burning, rashes, or lesions   MUSCULOSKELETAL: No joint pain or swelling; No muscle, back, or extremity pain        Medications:  MEDICATIONS  (STANDING):  cyanocobalamin 1000 MICROGram(s) Oral daily  heparin  Injectable 5000 Unit(s) SubCutaneous every 12 hours  influenza   Vaccine 0.5 milliLiter(s) IntraMuscular once  multivitamin 1 Tablet(s) Oral daily  pantoprazole    Tablet 40 milliGRAM(s) Oral before breakfast  potassium chloride   Solution 40 milliEquivalent(s) Oral once    MEDICATIONS  (PRN):    	    PHYSICAL EXAM:  T(C): 37.3 (10-05-18 @ 09:05), Max: 37.6 (10-04-18 @ 16:11)  HR: 73 (10-05-18 @ 09:05) (71 - 79)  BP: 102/61 (10-05-18 @ 09:05) (102/61 - 110/66)  RR: 18 (10-05-18 @ 09:05) (16 - 18)  SpO2: 95% (10-05-18 @ 09:05) (95% - 100%)  Wt(kg): --  I&O's Summary    04 Oct 2018 07:01  -  05 Oct 2018 07:00  --------------------------------------------------------  IN: 260 mL / OUT: 1400 mL / NET: -1140 mL      Appearance: Normal	  HEENT:   NCAT, PERRL, EOMI	  Lymphatic: No lymphadenopathy  Cardiovascular: Normal S1 S2, RRR  Respiratory: Lungs clear to auscultation BL  Psychiatry: A & O x 3, Mood & affect appropriate  Gastrointestinal:  Soft, Non-tender, + BS  Skin: No rashes, No ecchymoses, No cyanosis	  Neurologic: Non-focal  Extremities: Normal range of motion, No clubbing, cyanosis or edema    LABS:	 	    CARDIAC MARKERS:                                9.7    13.28 )-----------( 664      ( 05 Oct 2018 08:13 )             31.5     10-05    136  |  100  |  6<L>  ----------------------------<  92  3.3<L>   |  25  |  0.73    Ca    8.4      05 Oct 2018 06:19      proBNP:   Lipid Profile:   HgA1c:   TSH:

## 2018-10-05 NOTE — PROGRESS NOTE ADULT - SUBJECTIVE AND OBJECTIVE BOX
Patient is a 67y old  Male who presents with a chief complaint of Pancreatitis (05 Oct 2018 06:28)      SUBJECTIVE / OVERNIGHT EVENTS:  Temp max 99.4. Pt feels well.   MEDICATIONS  (STANDING):  cyanocobalamin 1000 MICROGram(s) Oral daily  heparin  Injectable 5000 Unit(s) SubCutaneous every 12 hours  influenza   Vaccine 0.5 milliLiter(s) IntraMuscular once  multivitamin 1 Tablet(s) Oral daily  pantoprazole    Tablet 40 milliGRAM(s) Oral before breakfast  potassium chloride   Solution 40 milliEquivalent(s) Oral once    MEDICATIONS  (PRN):              PHYSICAL EXAM:  GENERAL: NAD, well-developed  HEAD:  Atraumatic, Normocephalic  EYES: EOMI, PERRLA, conjunctiva and sclera anicteric  NECK: Supple, No JVD  CHEST/LUNG: Clear to auscultation bilaterally; No wheeze  HEART: Regular rate and rhythm; No murmurs, rubs, or gallops  ABDOMEN: Soft, Nontender, Nondistended; Bowel sounds present, no hepatosplenomegaly, no rebound or guarding  EXTREMITIES:  2+ Peripheral Pulses, No clubbing, cyanosis, or edema  PSYCH: AAOx3  NEUROLOGY: non-focal, no asterixis  SKIN: No rashes or lesion    LABS:                        9.7    13.28 )-----------( 664      ( 05 Oct 2018 08:13 )             31.5     10-05    136  |  100  |  6<L>  ----------------------------<  92  3.3<L>   |  25  |  0.73    Ca    8.4      05 Oct 2018 06:19                  RADIOLOGY & ADDITIONAL TESTS:

## 2018-10-06 LAB
ANION GAP SERPL CALC-SCNC: 11 MMOL/L — SIGNIFICANT CHANGE UP (ref 5–17)
BUN SERPL-MCNC: 8 MG/DL — SIGNIFICANT CHANGE UP (ref 7–23)
CALCIUM SERPL-MCNC: 8.8 MG/DL — SIGNIFICANT CHANGE UP (ref 8.4–10.5)
CHLORIDE SERPL-SCNC: 103 MMOL/L — SIGNIFICANT CHANGE UP (ref 96–108)
CO2 SERPL-SCNC: 23 MMOL/L — SIGNIFICANT CHANGE UP (ref 22–31)
CREAT SERPL-MCNC: 0.75 MG/DL — SIGNIFICANT CHANGE UP (ref 0.5–1.3)
GLUCOSE SERPL-MCNC: 103 MG/DL — HIGH (ref 70–99)
HCT VFR BLD CALC: 30.7 % — LOW (ref 39–50)
HGB BLD-MCNC: 9.7 G/DL — LOW (ref 13–17)
MCHC RBC-ENTMCNC: 27.4 PG — SIGNIFICANT CHANGE UP (ref 27–34)
MCHC RBC-ENTMCNC: 31.6 GM/DL — LOW (ref 32–36)
MCV RBC AUTO: 86.7 FL — SIGNIFICANT CHANGE UP (ref 80–100)
PLATELET # BLD AUTO: 603 K/UL — HIGH (ref 150–400)
POTASSIUM SERPL-MCNC: 4.1 MMOL/L — SIGNIFICANT CHANGE UP (ref 3.5–5.3)
POTASSIUM SERPL-SCNC: 4.1 MMOL/L — SIGNIFICANT CHANGE UP (ref 3.5–5.3)
RBC # BLD: 3.54 M/UL — LOW (ref 4.2–5.8)
RBC # FLD: 16.9 % — HIGH (ref 10.3–14.5)
SODIUM SERPL-SCNC: 137 MMOL/L — SIGNIFICANT CHANGE UP (ref 135–145)
WBC # BLD: 11.84 K/UL — HIGH (ref 3.8–10.5)
WBC # FLD AUTO: 11.84 K/UL — HIGH (ref 3.8–10.5)

## 2018-10-06 RX ADMIN — Medication 1 TABLET(S): at 12:39

## 2018-10-06 RX ADMIN — HEPARIN SODIUM 5000 UNIT(S): 5000 INJECTION INTRAVENOUS; SUBCUTANEOUS at 06:31

## 2018-10-06 RX ADMIN — HEPARIN SODIUM 5000 UNIT(S): 5000 INJECTION INTRAVENOUS; SUBCUTANEOUS at 17:26

## 2018-10-06 RX ADMIN — PANTOPRAZOLE SODIUM 40 MILLIGRAM(S): 20 TABLET, DELAYED RELEASE ORAL at 06:31

## 2018-10-06 RX ADMIN — PREGABALIN 1000 MICROGRAM(S): 225 CAPSULE ORAL at 12:39

## 2018-10-06 NOTE — PROGRESS NOTE ADULT - SUBJECTIVE AND OBJECTIVE BOX
CHIEF COMPLAINT:Patient is a 67y old  Male who presents with a chief complaint of Fever (01 Oct 2018 10:20)  no acute events    Allergies:  No Known Allergies      PAST MEDICAL & SURGICAL HISTORY:  Chronic pancreatitis, unspecified pancreatitis type  No significant past surgical history      FAMILY HISTORY:  No pertinent family history in first degree relatives      REVIEW OF SYSTEMS:  CONSTITUTIONAL: No fever, weight loss, less fatigue  EYES: No eye pain, visual disturbances, or discharge  NECK: No pain or stiffness  RESPIRATORY: No cough or wheezing, no shortness of breath  CARDIOVASCULAR: No chest pain, palpitations, dizziness, or leg swelling  GASTROINTESTINAL: No abdominal or epigastric pain. No nausea, vomiting, diarrhea or constipation  GENITOURINARY: No dysuria, urinary frequency or urgency, no hematuria  NEUROLOGICAL: No headaches, memory loss, loss of strength, numbness, or tremors  SKIN: No itching, burning, rashes, or lesions   MUSCULOSKELETAL: No joint pain or swelling; No muscle, back, or extremity pain        Medications:  MEDICATIONS  (STANDING):  cyanocobalamin 1000 MICROGram(s) Oral daily  heparin  Injectable 5000 Unit(s) SubCutaneous every 12 hours  influenza   Vaccine 0.5 milliLiter(s) IntraMuscular once  multivitamin 1 Tablet(s) Oral daily  pantoprazole    Tablet 40 milliGRAM(s) Oral before breakfast    MEDICATIONS  (PRN):    	    PHYSICAL EXAM:  T(C): 37.1 (10-06-18 @ 09:01), Max: 37.4 (10-05-18 @ 22:00)  HR: 84 (10-06-18 @ 09:01) (56 - 84)  BP: 90/58 (10-06-18 @ 09:01) (90/58 - 99/63)  RR: 18 (10-06-18 @ 09:01) (18 - 18)  SpO2: 98% (10-06-18 @ 09:01) (95% - 99%)  Wt(kg): --  I&O's Summary    05 Oct 2018 07:01  -  06 Oct 2018 07:00  --------------------------------------------------------  IN: 750 mL / OUT: 500 mL / NET: 250 mL    06 Oct 2018 07:01  -  06 Oct 2018 12:02  --------------------------------------------------------  IN: 220 mL / OUT: 400 mL / NET: -180 mL      Appearance: Normal	  HEENT:   NCAT, PERRL, EOMI	  Lymphatic: No lymphadenopathy  Cardiovascular: Normal S1 S2, RRR  Respiratory: Lungs clear to auscultation BL  Psychiatry: A & O x 3, Mood & affect appropriate  Gastrointestinal:  Soft, Non-tender, + BS  Skin: No rashes, No ecchymoses, No cyanosis	  Neurologic: Non-focal  Extremities: Normal range of motion, No clubbing, cyanosis or edema    LABS:	 	    CARDIAC MARKERS:                                9.7    11.84 )-----------( 603      ( 06 Oct 2018 08:48 )             30.7     10-06    137  |  103  |  8   ----------------------------<  103<H>  4.1   |  23  |  0.75    Ca    8.8      06 Oct 2018 07:11      proBNP:   Lipid Profile:   HgA1c:   TSH:

## 2018-10-06 NOTE — PROVIDER CONTACT NOTE (OTHER) - ACTION/TREATMENT ORDERED:
250cc Normal saline bolus.
09:15 monitor BP in both arms  09:30 BP in right arm 90/58, pt continues to be asymptomatic will continue to monitor

## 2018-10-06 NOTE — DOWNTIME INTERRUPTION NOTE - WHICH MANUAL FORMS INITIATED?
Documentation hold for POC and A&I flowsheets and Adult Patient Profile. Please see paper record for additonal documentation

## 2018-10-06 NOTE — PROVIDER CONTACT NOTE (OTHER) - RECOMMENDATIONS
-Reassess vital signs, continue to monitor closely. -Reassess vital signs, fluid bolus if needed and continue to monitor closely.

## 2018-10-06 NOTE — PROGRESS NOTE ADULT - ASSESSMENT
pt w hx recurrent pancreatitis w/ fever /leukocytosis /r/o sepsis  surg eval appreciated  pt reports had bx in [facility], no malignancy  possibly repeat bx/drainage, GI to comment  PV thrombus on MRI, however, US w/o thrombus  eventual EUS with possible cystgastrostomy/PD stenting  Hb stable s/p PRBC, monitor  GI following  FOBT  PPI  clears  iv fluids  dvt proph  ppis  id eval noted  f/u cultures  AFB low yeild as pt not coughing  CT chest deferred at this time, pt off isolation as no signs to indicate pulm TB  monitor off abx per ID  borderline low BP this AM - monitor, now improving

## 2018-10-06 NOTE — PROVIDER CONTACT NOTE (OTHER) - ASSESSMENT
BP 82/50 all other VSS (HR 81, RR 18 O2 98% on RA temp WDL. Pt is asymptomatic does not c/o chest pain, difficulty breathing or SOB.
BP 95/59, HR 84, Temp 99.4, RR18, 97% room air. Patient denies SOB, chest pain, dizziness.

## 2018-10-07 LAB
ANION GAP SERPL CALC-SCNC: 13 MMOL/L — SIGNIFICANT CHANGE UP (ref 5–17)
BUN SERPL-MCNC: 10 MG/DL — SIGNIFICANT CHANGE UP (ref 7–23)
CALCIUM SERPL-MCNC: 8.9 MG/DL — SIGNIFICANT CHANGE UP (ref 8.4–10.5)
CHLORIDE SERPL-SCNC: 101 MMOL/L — SIGNIFICANT CHANGE UP (ref 96–108)
CO2 SERPL-SCNC: 23 MMOL/L — SIGNIFICANT CHANGE UP (ref 22–31)
CREAT SERPL-MCNC: 0.8 MG/DL — SIGNIFICANT CHANGE UP (ref 0.5–1.3)
GLUCOSE SERPL-MCNC: 87 MG/DL — SIGNIFICANT CHANGE UP (ref 70–99)
HCT VFR BLD CALC: 32.8 % — LOW (ref 39–50)
HGB BLD-MCNC: 10.1 G/DL — LOW (ref 13–17)
MCHC RBC-ENTMCNC: 27.2 PG — SIGNIFICANT CHANGE UP (ref 27–34)
MCHC RBC-ENTMCNC: 30.8 GM/DL — LOW (ref 32–36)
MCV RBC AUTO: 88.2 FL — SIGNIFICANT CHANGE UP (ref 80–100)
PLATELET # BLD AUTO: 688 K/UL — HIGH (ref 150–400)
POTASSIUM SERPL-MCNC: 4.2 MMOL/L — SIGNIFICANT CHANGE UP (ref 3.5–5.3)
POTASSIUM SERPL-SCNC: 4.2 MMOL/L — SIGNIFICANT CHANGE UP (ref 3.5–5.3)
RBC # BLD: 3.72 M/UL — LOW (ref 4.2–5.8)
RBC # FLD: 16.7 % — HIGH (ref 10.3–14.5)
SODIUM SERPL-SCNC: 137 MMOL/L — SIGNIFICANT CHANGE UP (ref 135–145)
WBC # BLD: 12.23 K/UL — HIGH (ref 3.8–10.5)
WBC # FLD AUTO: 12.23 K/UL — HIGH (ref 3.8–10.5)

## 2018-10-07 RX ADMIN — PREGABALIN 1000 MICROGRAM(S): 225 CAPSULE ORAL at 13:01

## 2018-10-07 RX ADMIN — HEPARIN SODIUM 5000 UNIT(S): 5000 INJECTION INTRAVENOUS; SUBCUTANEOUS at 06:07

## 2018-10-07 RX ADMIN — Medication 1 TABLET(S): at 13:01

## 2018-10-07 RX ADMIN — HEPARIN SODIUM 5000 UNIT(S): 5000 INJECTION INTRAVENOUS; SUBCUTANEOUS at 17:32

## 2018-10-07 RX ADMIN — PANTOPRAZOLE SODIUM 40 MILLIGRAM(S): 20 TABLET, DELAYED RELEASE ORAL at 06:07

## 2018-10-07 NOTE — PROGRESS NOTE ADULT - ASSESSMENT
pt w hx recurrent pancreatitis w/ fever /leukocytosis   surg eval appreciated  pt reports had bx in [facility], no malignancy  possibly repeat bx/drainage, GI to f/u  PV thrombus on MRI, however, US w/o thrombus  eventual EUS with possible cystgastrostomy/PD stenting  Hb stable s/p PRBC, monitor  GI f/u  PPI  diet as per gi   iv fluids  dvt proph  ppis  id eval noted  f/u cultures  AFB low yeild as pt not coughing  CT chest deferred at this time, pt off isolation as no signs to indicate pulm TB  monitor off abx per ID

## 2018-10-07 NOTE — PROGRESS NOTE ADULT - SUBJECTIVE AND OBJECTIVE BOX
CHIEF COMPLAINT:Patient is a 67y old  Male who presents with a chief complaint of fevers (06 Oct 2018 12:02)    	        PAST MEDICAL & SURGICAL HISTORY:  Chronic pancreatitis, unspecified pancreatitis type  No significant past surgical history          REVIEW OF SYSTEMS:  CONSTITUTIONAL:weak  EYES: No eye pain, visual disturbances, or discharge  NECK: No pain or stiffness  RESPIRATORY: No cough, wheezing, chills or hemoptysis; No Shortness of Breath  CARDIOVASCULAR: No chest pain, palpitations, passing out, dizziness, or leg swelling  GASTROINTESTINAL: No abdominal or epigastric pain. No nausea, vomiting, or hematemesis; No diarrhea or constipation. No melena or hematochezia.  GENITOURINARY: No dysuria, frequency, hematuria, or incontinence  NEUROLOGICAL: No headaches,   MUSCULOSKELETAL: No joint pain or swelling; No muscle, back, or extremity pain    Medications:  MEDICATIONS  (STANDING):  cyanocobalamin 1000 MICROGram(s) Oral daily  heparin  Injectable 5000 Unit(s) SubCutaneous every 12 hours  influenza   Vaccine 0.5 milliLiter(s) IntraMuscular once  multivitamin 1 Tablet(s) Oral daily  pantoprazole    Tablet 40 milliGRAM(s) Oral before breakfast    MEDICATIONS  (PRN):    	    PHYSICAL EXAM:  T(C): 36.7 (10-07-18 @ 04:12), Max: 37.2 (10-06-18 @ 22:50)  HR: 63 (10-07-18 @ 04:12) (63 - 84)  BP: 109/66 (10-07-18 @ 04:12) (90/58 - 109/66)  RR: 18 (10-07-18 @ 04:12) (18 - 18)  SpO2: 98% (10-07-18 @ 04:12) (97% - 99%)  Wt(kg): --  I&O's Summary    06 Oct 2018 07:01  -  07 Oct 2018 07:00  --------------------------------------------------------  IN: 940 mL / OUT: 1850 mL / NET: -910 mL        Appearance: Normal	  HEENT:   Normal oral mucosa, PERRL, EOMI	  Lymphatic: No lymphadenopathy  Cardiovascular: Normal S1 S2, No JVD, No murmurs, No edema  Respiratory: Lungs clear to auscultation	  Psychiatry: A & O x   Gastrointestinal:  Soft, Non-tender, + BS	  Skin: No rashes, No ecchymoses, No cyanosis	  Neurologic: Non-focal  Extremities: Normal range of motion, No clubbing, cyanosis or edema  Vascular: Peripheral pulses palpable 2+ bilaterally    TELEMETRY: 	    ECG:  	  RADIOLOGY:  OTHER: 	  	  LABS:	 	    CARDIAC MARKERS:                                9.7    11.84 )-----------( 603      ( 06 Oct 2018 08:48 )             30.7     10-06    137  |  103  |  8   ----------------------------<  103<H>  4.1   |  23  |  0.75    Ca    8.8      06 Oct 2018 07:11      proBNP:   Lipid Profile:   HgA1c:   TSH:

## 2018-10-08 LAB
ANION GAP SERPL CALC-SCNC: 11 MMOL/L — SIGNIFICANT CHANGE UP (ref 5–17)
BUN SERPL-MCNC: 13 MG/DL — SIGNIFICANT CHANGE UP (ref 7–23)
CALCIUM SERPL-MCNC: 9.3 MG/DL — SIGNIFICANT CHANGE UP (ref 8.4–10.5)
CHLORIDE SERPL-SCNC: 99 MMOL/L — SIGNIFICANT CHANGE UP (ref 96–108)
CO2 SERPL-SCNC: 25 MMOL/L — SIGNIFICANT CHANGE UP (ref 22–31)
CREAT SERPL-MCNC: 1.1 MG/DL — SIGNIFICANT CHANGE UP (ref 0.5–1.3)
GLUCOSE SERPL-MCNC: 104 MG/DL — HIGH (ref 70–99)
HCT VFR BLD CALC: 31.7 % — LOW (ref 39–50)
HGB BLD-MCNC: 9.8 G/DL — LOW (ref 13–17)
MCHC RBC-ENTMCNC: 26.8 PG — LOW (ref 27–34)
MCHC RBC-ENTMCNC: 30.9 GM/DL — LOW (ref 32–36)
MCV RBC AUTO: 86.6 FL — SIGNIFICANT CHANGE UP (ref 80–100)
PLATELET # BLD AUTO: 666 K/UL — HIGH (ref 150–400)
POTASSIUM SERPL-MCNC: 4.3 MMOL/L — SIGNIFICANT CHANGE UP (ref 3.5–5.3)
POTASSIUM SERPL-SCNC: 4.3 MMOL/L — SIGNIFICANT CHANGE UP (ref 3.5–5.3)
RBC # BLD: 3.66 M/UL — LOW (ref 4.2–5.8)
RBC # FLD: 16.9 % — HIGH (ref 10.3–14.5)
SODIUM SERPL-SCNC: 135 MMOL/L — SIGNIFICANT CHANGE UP (ref 135–145)
WBC # BLD: 12.4 K/UL — HIGH (ref 3.8–10.5)
WBC # FLD AUTO: 12.4 K/UL — HIGH (ref 3.8–10.5)

## 2018-10-08 PROCEDURE — 99232 SBSQ HOSP IP/OBS MODERATE 35: CPT | Mod: GC

## 2018-10-08 RX ADMIN — Medication 1 TABLET(S): at 12:23

## 2018-10-08 RX ADMIN — PREGABALIN 1000 MICROGRAM(S): 225 CAPSULE ORAL at 12:23

## 2018-10-08 RX ADMIN — HEPARIN SODIUM 5000 UNIT(S): 5000 INJECTION INTRAVENOUS; SUBCUTANEOUS at 17:41

## 2018-10-08 RX ADMIN — PANTOPRAZOLE SODIUM 40 MILLIGRAM(S): 20 TABLET, DELAYED RELEASE ORAL at 06:07

## 2018-10-08 RX ADMIN — HEPARIN SODIUM 5000 UNIT(S): 5000 INJECTION INTRAVENOUS; SUBCUTANEOUS at 06:07

## 2018-10-08 NOTE — PROGRESS NOTE ADULT - ASSESSMENT
Impression:  1. Recurrent pancreatitis of unknown etiology: ddx includes autoimmune (slightly increased IgG4) vs. due to gallstones (seen on MR). Patient has pancreatic duct dilation as well.    2. Fluid collection: ddx includes WOPN vs. pseudocyst . If abscess will require drainage, if this is only a collection related to PD obstruction then the ideal course may be to stent the PD.  WBC uptrending since off abx but stable as of yesterday      3. Rule out pulmonary TB: pt cleared to come off isolation per ID    4. Peritoneal nodules seen on outside CT (scanned to [employer])    Recommendation:  -will discuss drainage vs. PD stenting with Dr. Ashish Christopher M.D.   [facility]  Contact: [phone] Impression:  1. Recurrent pancreatitis of unknown etiology: ddx includes autoimmune (slightly increased IgG4) vs. due to gallstones (seen on MR). Patient has pancreatic duct dilation as well.    2. Fluid collection: ddx includes WOPN vs. pseudocyst . given resolution of symptoms (fevers, s/p abx), the collection can be regarded as asymptomatic at this time      3. Rule out pulmonary TB: pt cleared to come off isolation per ID    4. Peritoneal nodules seen on outside CT (scanned to [employer]) likely sequelae of prior pancreatitis    Recommendation:  -will discuss drainage vs. PD stenting vs. clinical observation with Dr. Ashish Christopher M.D.   [facility]  Contact: [phone]

## 2018-10-08 NOTE — PROGRESS NOTE ADULT - ASSESSMENT
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

## 2018-10-08 NOTE — CHART NOTE - NSCHARTNOTEFT_GEN_A_CORE
Nutrition Follow-up  Chart reviewed, events noted.   Source: Patient [x]    Family [x]     other [x] Comprehensive review of medical records   Pt's son at bedside translated as needed, pt denied the need for translation services.     Diet : Regular, Ensure Enlive x2/day and Prosource x3/day.        Patient reports his appetite is OK, Pt reports he is only able to consume about 1/3 of the plate and maybe up to 50% at most. Pt reports drinking 1.5 Ensures and wishes to try chocolate and strawberry, RD requested for pt to try. Pt wishes to d/c prosource as he does not like the taste. Pt's family orders meals via room service for pt, RD reviewed ordering procedures. No N+V. Last BM was yesterday and pt reports he feels he will go again today.      PO intake:  [x]< 50% [ ] 50-75% [ ]   % [ ]  other :     Source for PO intake [x] Patient [x] family [ ] chart [ ] staff [ ] other     Enteral /Parenteral Nutrition: n/a       Current Weight: Weight (kg): 51.7 (10-07 @ 18:32), dosing wt : 118 pounds, 10/7: 114 pounds- 4 pound wt loss based on weights.       Pertinent Medications: MEDICATIONS  (STANDING):  cyanocobalamin 1000 MICROGram(s) Oral daily  heparin  Injectable 5000 Unit(s) SubCutaneous every 12 hours  influenza   Vaccine 0.5 milliLiter(s) IntraMuscular once  multivitamin 1 Tablet(s) Oral daily  pantoprazole    Tablet 40 milliGRAM(s) Oral before breakfast    MEDICATIONS  (PRN):    Pertinent Labs:  10-08 Na135 mmol/L Glu 104 mg/dL<H> K+ 4.3 mmol/L Cr  1.10 mg/dL BUN 13 mg/dL 09-14 Chol --    LDL --    HDL --    Trig 138 mg/dL      Skin: No edema or pressure injury documented.     Estimated Needs:   [x] no change since previous assessment  [ ] recalculated:       Previous Nutrition Diagnosis:     Malnutrition          Nutrition Diagnosis is [x] ongoing  [ ] resolved [ ] not applicable          New Nutrition Diagnosis: [x] not applicable     Interventions:     Recommend  1) Continue regular diet with Ensure Enlive x2/day. D/C Prosource.   2) Encourage po intake with nutrient dense foods.   3) Provide food preferences as able.   4) Monitor weight, lab values, skin, po intake and GI tolerance.        Monitoring and Evaluation:   follow up per protocol  RD to remain available for further nutritional interventions as indicated.   Darby Nieto, MS, RD, CDN #[phone]

## 2018-10-08 NOTE — PROGRESS NOTE ADULT - SUBJECTIVE AND OBJECTIVE BOX
Patient is a 67y old  Male who presents with a chief complaint of fevers (06 Oct 2018 12:02)      SUBJECTIVE / OVERNIGHT EVENTS:  no abd pain n/v  MEDICATIONS  (STANDING):  cyanocobalamin 1000 MICROGram(s) Oral daily  heparin  Injectable 5000 Unit(s) SubCutaneous every 12 hours  influenza   Vaccine 0.5 milliLiter(s) IntraMuscular once  multivitamin 1 Tablet(s) Oral daily  pantoprazole    Tablet 40 milliGRAM(s) Oral before breakfast    MEDICATIONS  (PRN):              PHYSICAL EXAM:  GENERAL: NAD, well-developed  HEAD:  Atraumatic, Normocephalic  EYES: EOMI, PERRLA, conjunctiva and sclera anicteric  NECK: Supple, No JVD  CHEST/LUNG: Clear to auscultation bilaterally; No wheeze  HEART: Regular rate and rhythm; No murmurs, rubs, or gallops  ABDOMEN: Soft, Nontender, Nondistended; Bowel sounds present, no hepatosplenomegaly, no rebound or guarding  EXTREMITIES:  2+ Peripheral Pulses, No clubbing, cyanosis, or edema  PSYCH: AAOx3  NEUROLOGY: non-focal, no asterixis  SKIN: No rashes or lesion    LABS:                        10.1   12.23 )-----------( 688      ( 07 Oct 2018 10:17 )             32.8     10-08    135  |  99  |  13  ----------------------------<  104<H>  4.3   |  25  |  1.10    Ca    9.3      08 Oct 2018 07:04                  RADIOLOGY & ADDITIONAL TESTS:

## 2018-10-08 NOTE — PROGRESS NOTE ADULT - SUBJECTIVE AND OBJECTIVE BOX
CHIEF COMPLAINT:Patient is a 67y old  Male who presents with a chief complaint of fevers (06 Oct 2018 12:02)  no acute events    PAST MEDICAL & SURGICAL HISTORY:  Chronic pancreatitis, unspecified pancreatitis type  No significant past surgical history          REVIEW OF SYSTEMS:  CONSTITUTIONAL:weak  EYES: No eye pain, visual disturbances, or discharge  NECK: No pain or stiffness  RESPIRATORY: No cough, wheezing, chills or hemoptysis; No Shortness of Breath  CARDIOVASCULAR: No chest pain, palpitations, passing out, dizziness, or leg swelling  GASTROINTESTINAL: No abdominal or epigastric pain. No nausea, vomiting, or hematemesis; No diarrhea or constipation. No melena or hematochezia.  GENITOURINARY: No dysuria, frequency, hematuria, or incontinence  NEUROLOGICAL: No headaches,   MUSCULOSKELETAL: No joint pain or swelling; No muscle, back, or extremity pain      Medications:  MEDICATIONS  (STANDING):  cyanocobalamin 1000 MICROGram(s) Oral daily  heparin  Injectable 5000 Unit(s) SubCutaneous every 12 hours  influenza   Vaccine 0.5 milliLiter(s) IntraMuscular once  multivitamin 1 Tablet(s) Oral daily  pantoprazole    Tablet 40 milliGRAM(s) Oral before breakfast    MEDICATIONS  (PRN):    	    PHYSICAL EXAM:  T(C): 36.9 (10-08-18 @ 13:08), Max: 37.6 (10-07-18 @ 23:57)  HR: 74 (10-08-18 @ 13:08) (71 - 78)  BP: 91/51 (10-08-18 @ 13:08) (90/59 - 96/61)  RR: 16 (10-08-18 @ 13:08) (16 - 16)  SpO2: 97% (10-08-18 @ 13:08) (96% - 99%)  Wt(kg): --  I&O's Summary    07 Oct 2018 07:01  -  08 Oct 2018 07:00  --------------------------------------------------------  IN: 1560 mL / OUT: 803 mL / NET: 757 mL    08 Oct 2018 07:01  -  08 Oct 2018 14:01  --------------------------------------------------------  IN: 360 mL / OUT: 0 mL / NET: 360 mL      Appearance: Normal	  HEENT:   Normal oral mucosa, PERRL, EOMI	  Lymphatic: No lymphadenopathy  Cardiovascular: Normal S1 S2, No JVD, No murmurs, No edema  Respiratory: Lungs clear to auscultation	  Psychiatry: A & O x   Gastrointestinal:  Soft, Non-tender, + BS	  Skin: No rashes, No ecchymoses, No cyanosis	  Neurologic: Non-focal  Extremities: Normal range of motion, No clubbing, cyanosis or edema  Vascular: Peripheral pulses palpable 2+ bilaterally    LABS:	 	    CARDIAC MARKERS:                                9.8    12.40 )-----------( 666      ( 08 Oct 2018 08:06 )             31.7     10-08    135  |  99  |  13  ----------------------------<  104<H>  4.3   |  25  |  1.10    Ca    9.3      08 Oct 2018 07:04      proBNP:   Lipid Profile:   HgA1c:   TSH:

## 2018-10-09 LAB
ANION GAP SERPL CALC-SCNC: 13 MMOL/L — SIGNIFICANT CHANGE UP (ref 5–17)
BUN SERPL-MCNC: 16 MG/DL — SIGNIFICANT CHANGE UP (ref 7–23)
CALCIUM SERPL-MCNC: 9.3 MG/DL — SIGNIFICANT CHANGE UP (ref 8.4–10.5)
CHLORIDE SERPL-SCNC: 99 MMOL/L — SIGNIFICANT CHANGE UP (ref 96–108)
CO2 SERPL-SCNC: 23 MMOL/L — SIGNIFICANT CHANGE UP (ref 22–31)
CREAT SERPL-MCNC: 0.81 MG/DL — SIGNIFICANT CHANGE UP (ref 0.5–1.3)
GLUCOSE SERPL-MCNC: 103 MG/DL — HIGH (ref 70–99)
HCT VFR BLD CALC: 34.5 % — LOW (ref 39–50)
HGB BLD-MCNC: 10.6 G/DL — LOW (ref 13–17)
MCHC RBC-ENTMCNC: 27.1 PG — SIGNIFICANT CHANGE UP (ref 27–34)
MCHC RBC-ENTMCNC: 30.7 GM/DL — LOW (ref 32–36)
MCV RBC AUTO: 88.2 FL — SIGNIFICANT CHANGE UP (ref 80–100)
PLATELET # BLD AUTO: 703 K/UL — HIGH (ref 150–400)
POTASSIUM SERPL-MCNC: 4.3 MMOL/L — SIGNIFICANT CHANGE UP (ref 3.5–5.3)
POTASSIUM SERPL-SCNC: 4.3 MMOL/L — SIGNIFICANT CHANGE UP (ref 3.5–5.3)
RBC # BLD: 3.91 M/UL — LOW (ref 4.2–5.8)
RBC # FLD: 17.1 % — HIGH (ref 10.3–14.5)
SODIUM SERPL-SCNC: 135 MMOL/L — SIGNIFICANT CHANGE UP (ref 135–145)
WBC # BLD: 13.06 K/UL — HIGH (ref 3.8–10.5)
WBC # FLD AUTO: 13.06 K/UL — HIGH (ref 3.8–10.5)

## 2018-10-09 RX ADMIN — Medication 1 TABLET(S): at 12:25

## 2018-10-09 RX ADMIN — PANTOPRAZOLE SODIUM 40 MILLIGRAM(S): 20 TABLET, DELAYED RELEASE ORAL at 06:54

## 2018-10-09 RX ADMIN — PREGABALIN 1000 MICROGRAM(S): 225 CAPSULE ORAL at 12:26

## 2018-10-09 RX ADMIN — HEPARIN SODIUM 5000 UNIT(S): 5000 INJECTION INTRAVENOUS; SUBCUTANEOUS at 17:20

## 2018-10-09 NOTE — PROGRESS NOTE ADULT - ASSESSMENT
68 yo male Cantonese speaking Male (used  phone)  with PMH of chronic pancreatitis who has had multiple bouts of pancreatitis was admitted with fever and vomiting blood on 9-28-18.     1.  Normocytic Anemia Secondary AOCD  -  Had  per patient never had a blood transfusion had 1st unit on 10-1-18 without issues  -  Recent hematemesis  -   Labs on 10-1-18 with iron 10, percent sat 8 and ferritin 560 c/w ACOD  -  Vitamin B12 429 and folate 6.7 with   -  EGD on 9-18-18 with duodenitis  -  hb stable at 9.8 on 10-7-18    2.  Chronic Pancreatitis  -   MRI abdomen/pelvis 9-30-18 acute on chronic pancreatitis.  6 cm peripherally enhancing collection.  Acute thrombosis of the main, right and left portal veins.   -   On SQ heparin  -   tolerating diet without pain    3.  Elevated Platelets  -   iron levels c/w AOCD  -   Likely reactive from pancreatitis but trending up so added LORI 2 with labs.    4.  Fever  -   previously on IV zosyn - now off abx, ID following  -   Await quanteferon gold  per ID - is intermediate  -   TB ruled out, no cough, fever better    5.   Elevated WBC  -    will monitor has been fluctuating in the 12's  -    off abxs per ID

## 2018-10-09 NOTE — PROGRESS NOTE ADULT - SUBJECTIVE AND OBJECTIVE BOX
CHIEF COMPLAINT:Patient is a 67y old  Male who presents with a chief complaint of fevers (06 Oct 2018 12:02)  no acute events    PAST MEDICAL & SURGICAL HISTORY:  Chronic pancreatitis, unspecified pancreatitis type  No significant past surgical history          REVIEW OF SYSTEMS:  CONSTITUTIONAL:weak  EYES: No eye pain, visual disturbances, or discharge  NECK: No pain or stiffness  RESPIRATORY: No cough, wheezing, chills or hemoptysis; No Shortness of Breath  CARDIOVASCULAR: No chest pain, palpitations, passing out, dizziness, or leg swelling  GASTROINTESTINAL: No abdominal or epigastric pain. No nausea, vomiting, or hematemesis; No diarrhea or constipation. No melena or hematochezia.  GENITOURINARY: No dysuria, frequency, hematuria, or incontinence  NEUROLOGICAL: No headaches,   MUSCULOSKELETAL: No joint pain or swelling; No muscle, back, or extremity pain      Medications:  MEDICATIONS  (STANDING):  cyanocobalamin 1000 MICROGram(s) Oral daily  heparin  Injectable 5000 Unit(s) SubCutaneous every 12 hours  influenza   Vaccine 0.5 milliLiter(s) IntraMuscular once  multivitamin 1 Tablet(s) Oral daily  pantoprazole    Tablet 40 milliGRAM(s) Oral before breakfast    MEDICATIONS  (PRN):    	    PHYSICAL EXAM:  T(C): 37.2 (10-09-18 @ 08:46), Max: 37.5 (10-08-18 @ 21:36)  HR: 74 (10-09-18 @ 08:46) (69 - 79)  BP: 89/52 (10-09-18 @ 08:46) (85/53 - 92/58)  RR: 16 (10-09-18 @ 08:46) (16 - 16)  SpO2: 98% (10-09-18 @ 08:46) (96% - 98%)  Wt(kg): --  I&O's Summary    08 Oct 2018 07:01  -  09 Oct 2018 07:00  --------------------------------------------------------  IN: 1220 mL / OUT: 0 mL / NET: 1220 mL    09 Oct 2018 07:01  -  09 Oct 2018 10:13  --------------------------------------------------------  IN: 0 mL / OUT: 0 mL / NET: 0 mL      Appearance: Normal	  HEENT:   Normal oral mucosa, PERRL, EOMI	  Lymphatic: No lymphadenopathy  Cardiovascular: Normal S1 S2, No JVD, No murmurs, No edema  Respiratory: Lungs clear to auscultation	  Psychiatry: A & O x 3  Gastrointestinal:  Soft, Non-tender, + BS	  Skin: No rashes, No ecchymoses, No cyanosis	  Neurologic: Non-focal  Extremities: Normal range of motion, No clubbing, cyanosis or edema  Vascular: Peripheral pulses palpable 2+ bilaterally    LABS:	 	    CARDIAC MARKERS:                                10.6   13.06 )-----------( 703      ( 09 Oct 2018 09:49 )             34.5     10-09    135  |  99  |  16  ----------------------------<  103<H>  4.3   |  23  |  0.81    Ca    9.3      09 Oct 2018 07:19      proBNP:   Lipid Profile:   HgA1c:   TSH:

## 2018-10-09 NOTE — PROGRESS NOTE ADULT - ASSESSMENT
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

## 2018-10-09 NOTE — PROGRESS NOTE ADULT - SUBJECTIVE AND OBJECTIVE BOX
Chief Complaint: Patient overall okay.    History of Present Illness:    The patient overall is doing okay.  He has no chest pain.  No pleuritic chest pain.  No abdominal pain.  No nausea.  No vomiting.  No diarrhea.  No obvious bleeding.  Has some mild fatigue.  No fevers.  Remainder ROS is stable.     MEDICATIONS  (STANDING):  cyanocobalamin 1000 MICROGram(s) Oral daily  heparin  Injectable 5000 Unit(s) SubCutaneous every 12 hours  influenza   Vaccine 0.5 milliLiter(s) IntraMuscular once  multivitamin 1 Tablet(s) Oral daily  pantoprazole    Tablet 40 milliGRAM(s) Oral before breakfast    MEDICATIONS  (PRN):      Allergies    No Known Allergies    Intolerances        Vital Signs Last 24 Hrs  T(C): 37.2 (09 Oct 2018 04:15), Max: 37.5 (08 Oct 2018 21:36)  T(F): 99 (09 Oct 2018 04:15), Max: 99.5 (08 Oct 2018 21:36)  HR: 69 (09 Oct 2018 04:15) (69 - 79)  BP: 91/56 (09 Oct 2018 04:15) (85/53 - 92/58)  BP(mean): --  RR: 16 (09 Oct 2018 04:15) (16 - 16)  SpO2: 98% (09 Oct 2018 04:15) (96% - 98%)    PHYSICAL EXAM  General: NAD  HEENT: clear oropharynx, anicteric sclera, pink conjunctiva  Neck: supple  CV: normal S1/S2  Lungs: clear to auscultation  Abdomen: soft non-tender non-distended, positive bowel sounds  Ext: no edema  Skin: no rashes  Neuro: alert and oriented X 3    LABS:                          9.8    12.40 )-----------( 666      ( 08 Oct 2018 08:06 )             31.7         Mean Cell Volume : 86.6 fl  Mean Cell Hemoglobin : 26.8 pg  Mean Cell Hemoglobin Concentration : 30.9 gm/dL  Auto Neutrophil # : x  Auto Lymphocyte # : x  Auto Monocyte # : x  Auto Eosinophil # : x  Auto Basophil # : x  Auto Neutrophil % : x  Auto Lymphocyte % : x  Auto Monocyte % : x  Auto Eosinophil % : x  Auto Basophil % : x      Serial CBC's  10-08 @ 08:06  Hct-31.7 / Hgb-9.8 / Plat-666 / RBC-3.66 / WBC-12.40  Serial CBC's  10-07 @ 10:17  Hct-32.8 / Hgb-10.1 / Plat-688 / RBC-3.72 / WBC-12.23  Serial CBC's  10-06 @ 08:48  Hct-30.7 / Hgb-9.7 / Plat-603 / RBC-3.54 / WBC-11.84  Serial CBC's  10-05 @ 08:13  Hct-31.5 / Hgb-9.7 / Plat-664 / RBC-3.60 / WBC-13.28      10-08    135  |  99  |  13  ----------------------------<  104<H>  4.3   |  25  |  1.10    Ca    9.3      08 Oct 2018 07:04

## 2018-10-10 LAB
BASOPHILS # BLD AUTO: 0.03 K/UL — SIGNIFICANT CHANGE UP (ref 0–0.2)
BASOPHILS NFR BLD AUTO: 0.2 % — SIGNIFICANT CHANGE UP (ref 0–2)
EOSINOPHIL # BLD AUTO: 0.14 K/UL — SIGNIFICANT CHANGE UP (ref 0–0.5)
EOSINOPHIL NFR BLD AUTO: 1.1 % — SIGNIFICANT CHANGE UP (ref 0–6)
HCT VFR BLD CALC: 34.2 % — LOW (ref 39–50)
HGB BLD-MCNC: 10.4 G/DL — LOW (ref 13–17)
IMM GRANULOCYTES NFR BLD AUTO: 1 % — SIGNIFICANT CHANGE UP (ref 0–1.5)
LYMPHOCYTES # BLD AUTO: 17.9 % — SIGNIFICANT CHANGE UP (ref 13–44)
LYMPHOCYTES # BLD AUTO: 2.34 K/UL — SIGNIFICANT CHANGE UP (ref 1–3.3)
MCHC RBC-ENTMCNC: 26.7 PG — LOW (ref 27–34)
MCHC RBC-ENTMCNC: 30.4 GM/DL — LOW (ref 32–36)
MCV RBC AUTO: 87.7 FL — SIGNIFICANT CHANGE UP (ref 80–100)
MONOCYTES # BLD AUTO: 1.25 K/UL — HIGH (ref 0–0.9)
MONOCYTES NFR BLD AUTO: 9.6 % — SIGNIFICANT CHANGE UP (ref 2–14)
NEUTROPHILS # BLD AUTO: 9.16 K/UL — HIGH (ref 1.8–7.4)
NEUTROPHILS NFR BLD AUTO: 70.2 % — SIGNIFICANT CHANGE UP (ref 43–77)
PLATELET # BLD AUTO: 741 K/UL — HIGH (ref 150–400)
RBC # BLD: 3.9 M/UL — LOW (ref 4.2–5.8)
RBC # FLD: 16.9 % — HIGH (ref 10.3–14.5)
WBC # BLD: 13.05 K/UL — HIGH (ref 3.8–10.5)
WBC # FLD AUTO: 13.05 K/UL — HIGH (ref 3.8–10.5)

## 2018-10-10 PROCEDURE — 99231 SBSQ HOSP IP/OBS SF/LOW 25: CPT | Mod: GC

## 2018-10-10 RX ADMIN — HEPARIN SODIUM 5000 UNIT(S): 5000 INJECTION INTRAVENOUS; SUBCUTANEOUS at 06:39

## 2018-10-10 RX ADMIN — HEPARIN SODIUM 5000 UNIT(S): 5000 INJECTION INTRAVENOUS; SUBCUTANEOUS at 17:19

## 2018-10-10 RX ADMIN — PREGABALIN 1000 MICROGRAM(S): 225 CAPSULE ORAL at 12:59

## 2018-10-10 RX ADMIN — PANTOPRAZOLE SODIUM 40 MILLIGRAM(S): 20 TABLET, DELAYED RELEASE ORAL at 06:39

## 2018-10-10 RX ADMIN — Medication 1 TABLET(S): at 13:02

## 2018-10-10 NOTE — PROGRESS NOTE ADULT - ASSESSMENT
Impression:  1. Recurrent pancreatitis of unknown etiology: ddx most likely due to gallstones (seen on MR). Patient has pancreatic duct dilation as well.    2. Fluid collection: ddx includes WOPN vs. pseudocyst . given resolution of symptoms (fevers, s/p abx), the collection can be regarded as asymptomatic at this time      3. Rule out pulmonary TB: pt cleared to come off isolation per ID    4. Peritoneal nodules seen on outside CT (scanned to [employer]) likely sequelae of prior pancreatitis    Recommendation:  -will discuss drainage vs. PD stenting vs. clinical observation with Dr. Ashish Christopher M.D.   [facility]  Contact: [phone] Impression:  1. Recurrent pancreatitis of unknown etiology: ddx most likely due to gallstones (seen on MR). Patient has pancreatic duct dilation as well.    2. Fluid collection: ddx includes WOPN vs. pseudocyst . given resolution of symptoms (fevers, s/p abx), the collection can be regarded as asymptomatic at this time      3. Rule out pulmonary TB: pt cleared to come off isolation per ID    4. Peritoneal nodules seen on outside CT (scanned to [employer]) likely sequelae of prior pancreatitis    Recommendation:  -given the fact that the patient is currently, afebrile, asymptomatic and with a stable (although slightly elevated) WBC count, we will recommend that the patient may be discharged from a GI perspective, and he will need to follow up with Dr. Green in 2-4 [phone] weeks and at that time we will consider repeat imaging to assess resolution of the collection.      Hilario Christopher M.D.   [facility]  Contact: [phone] Impression:  1. Recurrent pancreatitis of unknown etiology: ddx most likely due to gallstones (seen on MR). Patient has pancreatic duct dilation as well.    2. Fluid collection: ddx includes WOPN  . given resolution of symptoms (fevers, s/p abx), the collection can be regarded as asymptomatic at this time      3. Rule out pulmonary TB: pt cleared to come off isolation per ID    4. Peritoneal nodules seen on outside CT (scanned to [employer]) likely sequelae of prior pancreatitis    Recommendation:  -given the fact that the patient is currently, afebrile, asymptomatic and with a stable (although slightly elevated) WBC count, we will recommend that the patient may be discharged from a GI perspective, and he will need to follow up with Dr. Green in 2-4 [phone] weeks and at that time we will consider repeat imaging to assess resolution of the collection.      Hilario Christopher M.D.   [facility]  Contact: [phone]

## 2018-10-10 NOTE — PROGRESS NOTE ADULT - SUBJECTIVE AND OBJECTIVE BOX
Patient is a 67y old  Male who presents with a chief complaint of Fever (08 Oct 2018 07:51)      SUBJECTIVE / OVERNIGHT EVENTS:  no events  MEDICATIONS  (STANDING):  cyanocobalamin 1000 MICROGram(s) Oral daily  heparin  Injectable 5000 Unit(s) SubCutaneous every 12 hours  influenza   Vaccine 0.5 milliLiter(s) IntraMuscular once  multivitamin 1 Tablet(s) Oral daily  pantoprazole    Tablet 40 milliGRAM(s) Oral before breakfast    MEDICATIONS  (PRN):              PHYSICAL EXAM:  GENERAL: NAD, well-developed  HEAD:  Atraumatic, Normocephalic  EYES: EOMI, PERRLA, conjunctiva and sclera anicteric  NECK: Supple, No JVD  CHEST/LUNG: Clear to auscultation bilaterally; No wheeze  HEART: Regular rate and rhythm; No murmurs, rubs, or gallops  ABDOMEN: Soft, Nontender, Nondistended; Bowel sounds present, no hepatosplenomegaly, no rebound or guarding  EXTREMITIES:  2+ Peripheral Pulses, No clubbing, cyanosis, or edema  PSYCH: AAOx3  NEUROLOGY: non-focal, no asterixis  SKIN: No rashes or lesion    LABS:                        10.4   13.05 )-----------( 741      ( 10 Oct 2018 08:10 )             34.2     10-09    135  |  99  |  16  ----------------------------<  103<H>  4.3   |  23  |  0.81    Ca    9.3      09 Oct 2018 07:19                  RADIOLOGY & ADDITIONAL TESTS:

## 2018-10-10 NOTE — PROGRESS NOTE ADULT - ASSESSMENT
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

## 2018-10-10 NOTE — PROGRESS NOTE ADULT - REASON FOR ADMISSION
Fever
Fever, pancreatitis
Pancreatitis
Pancreatitis
Sepsis
fever
fevers
Fever

## 2018-10-10 NOTE — PROGRESS NOTE ADULT - SUBJECTIVE AND OBJECTIVE BOX
CHIEF COMPLAINT:Patient is a 67y old  Male who presents with a chief complaint of fevers (06 Oct 2018 12:02)  no acute events    PAST MEDICAL & SURGICAL HISTORY:  Chronic pancreatitis, unspecified pancreatitis type  No significant past surgical history          REVIEW OF SYSTEMS:  CONSTITUTIONAL:weak  EYES: No eye pain, visual disturbances, or discharge  NECK: No pain or stiffness  RESPIRATORY: No cough, wheezing, chills or hemoptysis; No Shortness of Breath  CARDIOVASCULAR: No chest pain, palpitations, passing out, dizziness, or leg swelling  GASTROINTESTINAL: No abdominal or epigastric pain. No nausea, vomiting, or hematemesis; No diarrhea or constipation. No melena or hematochezia.  GENITOURINARY: No dysuria, frequency, hematuria, or incontinence  NEUROLOGICAL: No headaches,   MUSCULOSKELETAL: No joint pain or swelling; No muscle, back, or extremity pain        Medications:  MEDICATIONS  (STANDING):  cyanocobalamin 1000 MICROGram(s) Oral daily  heparin  Injectable 5000 Unit(s) SubCutaneous every 12 hours  influenza   Vaccine 0.5 milliLiter(s) IntraMuscular once  multivitamin 1 Tablet(s) Oral daily  pantoprazole    Tablet 40 milliGRAM(s) Oral before breakfast    MEDICATIONS  (PRN):    	    PHYSICAL EXAM:  T(C): 36.8 (10-10-18 @ 14:07), Max: 37 (10-10-18 @ 00:03)  HR: 69 (10-10-18 @ 14:07) (66 - 78)  BP: 92/55 (10-10-18 @ 14:07) (90/55 - 93/55)  RR: 18 (10-10-18 @ 14:07) (18 - 18)  SpO2: 97% (10-10-18 @ 14:07) (97% - 98%)  Wt(kg): --  I&O's Summary    09 Oct 2018 07:01  -  10 Oct 2018 07:00  --------------------------------------------------------  IN: 740 mL / OUT: 850 mL / NET: -110 mL    10 Oct 2018 07:01  -  10 Oct 2018 16:01  --------------------------------------------------------  IN: 0 mL / OUT: 650 mL / NET: -650 mL      Appearance: Normal	  HEENT:   Normal oral mucosa, PERRL, EOMI	  Lymphatic: No lymphadenopathy  Cardiovascular: Normal S1 S2, No JVD, No murmurs, No edema  Respiratory: Lungs clear to auscultation	  Psychiatry: A & O x 3  Gastrointestinal:  Soft, Non-tender, + BS	  Skin: No rashes, No ecchymoses, No cyanosis	  Neurologic: Non-focal  Extremities: Normal range of motion, No clubbing, cyanosis or edema  Vascular: Peripheral pulses palpable 2+ bilaterally    LABS:	 	    CARDIAC MARKERS:                                10.4   13.05 )-----------( 741      ( 10 Oct 2018 08:10 )             34.2     10-09    135  |  99  |  16  ----------------------------<  103<H>  4.3   |  23  |  0.81    Ca    9.3      09 Oct 2018 07:19      proBNP:   Lipid Profile:   HgA1c:   TSH:

## 2018-10-11 ENCOUNTER — TRANSCRIPTION ENCOUNTER (OUTPATIENT)
Age: 68
End: 2018-10-11

## 2018-10-11 VITALS
OXYGEN SATURATION: 96 % | RESPIRATION RATE: 16 BRPM | SYSTOLIC BLOOD PRESSURE: 93 MMHG | HEART RATE: 72 BPM | DIASTOLIC BLOOD PRESSURE: 60 MMHG | TEMPERATURE: 98 F

## 2018-10-11 LAB
ANION GAP SERPL CALC-SCNC: 14 MMOL/L — SIGNIFICANT CHANGE UP (ref 5–17)
BUN SERPL-MCNC: 19 MG/DL — SIGNIFICANT CHANGE UP (ref 7–23)
CALCIUM SERPL-MCNC: 9 MG/DL — SIGNIFICANT CHANGE UP (ref 8.4–10.5)
CHLORIDE SERPL-SCNC: 96 MMOL/L — SIGNIFICANT CHANGE UP (ref 96–108)
CO2 SERPL-SCNC: 25 MMOL/L — SIGNIFICANT CHANGE UP (ref 22–31)
CREAT SERPL-MCNC: 1.06 MG/DL — SIGNIFICANT CHANGE UP (ref 0.5–1.3)
GLUCOSE SERPL-MCNC: 105 MG/DL — HIGH (ref 70–99)
HCT VFR BLD CALC: 34.2 % — LOW (ref 39–50)
HGB BLD-MCNC: 10 G/DL — LOW (ref 13–17)
MCHC RBC-ENTMCNC: 25.9 PG — LOW (ref 27–34)
MCHC RBC-ENTMCNC: 29.2 GM/DL — LOW (ref 32–36)
MCV RBC AUTO: 88.6 FL — SIGNIFICANT CHANGE UP (ref 80–100)
PLATELET # BLD AUTO: 807 K/UL — HIGH (ref 150–400)
POTASSIUM SERPL-MCNC: 4.4 MMOL/L — SIGNIFICANT CHANGE UP (ref 3.5–5.3)
POTASSIUM SERPL-SCNC: 4.4 MMOL/L — SIGNIFICANT CHANGE UP (ref 3.5–5.3)
RBC # BLD: 3.86 M/UL — LOW (ref 4.2–5.8)
RBC # FLD: 17.1 % — HIGH (ref 10.3–14.5)
SODIUM SERPL-SCNC: 135 MMOL/L — SIGNIFICANT CHANGE UP (ref 135–145)
WBC # BLD: 13.92 K/UL — HIGH (ref 3.8–10.5)
WBC # FLD AUTO: 13.92 K/UL — HIGH (ref 3.8–10.5)

## 2018-10-11 PROCEDURE — 83550 IRON BINDING TEST: CPT

## 2018-10-11 PROCEDURE — 86480 TB TEST CELL IMMUN MEASURE: CPT

## 2018-10-11 PROCEDURE — 85610 PROTHROMBIN TIME: CPT

## 2018-10-11 PROCEDURE — P9016: CPT

## 2018-10-11 PROCEDURE — 82435 ASSAY OF BLOOD CHLORIDE: CPT

## 2018-10-11 PROCEDURE — 82746 ASSAY OF FOLIC ACID SERUM: CPT

## 2018-10-11 PROCEDURE — 84295 ASSAY OF SERUM SODIUM: CPT

## 2018-10-11 PROCEDURE — 82947 ASSAY GLUCOSE BLOOD QUANT: CPT

## 2018-10-11 PROCEDURE — 80048 BASIC METABOLIC PNL TOTAL CA: CPT

## 2018-10-11 PROCEDURE — 76700 US EXAM ABDOM COMPLETE: CPT

## 2018-10-11 PROCEDURE — 85027 COMPLETE CBC AUTOMATED: CPT

## 2018-10-11 PROCEDURE — 85014 HEMATOCRIT: CPT

## 2018-10-11 PROCEDURE — 82607 VITAMIN B-12: CPT

## 2018-10-11 PROCEDURE — 83605 ASSAY OF LACTIC ACID: CPT

## 2018-10-11 PROCEDURE — 83010 ASSAY OF HAPTOGLOBIN QUANT: CPT

## 2018-10-11 PROCEDURE — 87186 SC STD MICRODIL/AGAR DIL: CPT

## 2018-10-11 PROCEDURE — 82330 ASSAY OF CALCIUM: CPT

## 2018-10-11 PROCEDURE — 96365 THER/PROPH/DIAG IV INF INIT: CPT

## 2018-10-11 PROCEDURE — 87184 SC STD DISK METHOD PER PLATE: CPT

## 2018-10-11 PROCEDURE — 87116 MYCOBACTERIA CULTURE: CPT

## 2018-10-11 PROCEDURE — 86900 BLOOD TYPING SEROLOGIC ABO: CPT

## 2018-10-11 PROCEDURE — 87581 M.PNEUMON DNA AMP PROBE: CPT

## 2018-10-11 PROCEDURE — 99285 EMERGENCY DEPT VISIT HI MDM: CPT | Mod: 25

## 2018-10-11 PROCEDURE — 93005 ELECTROCARDIOGRAM TRACING: CPT

## 2018-10-11 PROCEDURE — 87015 SPECIMEN INFECT AGNT CONCNTJ: CPT

## 2018-10-11 PROCEDURE — 87206 SMEAR FLUORESCENT/ACID STAI: CPT

## 2018-10-11 PROCEDURE — 87798 DETECT AGENT NOS DNA AMP: CPT

## 2018-10-11 PROCEDURE — 86901 BLOOD TYPING SEROLOGIC RH(D): CPT

## 2018-10-11 PROCEDURE — 87486 CHLMYD PNEUM DNA AMP PROBE: CPT

## 2018-10-11 PROCEDURE — 74182 MRI ABDOMEN W/CONTRAST: CPT

## 2018-10-11 PROCEDURE — 36430 TRANSFUSION BLD/BLD COMPNT: CPT

## 2018-10-11 PROCEDURE — 82150 ASSAY OF AMYLASE: CPT

## 2018-10-11 PROCEDURE — 84100 ASSAY OF PHOSPHORUS: CPT

## 2018-10-11 PROCEDURE — 83735 ASSAY OF MAGNESIUM: CPT

## 2018-10-11 PROCEDURE — 85730 THROMBOPLASTIN TIME PARTIAL: CPT

## 2018-10-11 PROCEDURE — 84132 ASSAY OF SERUM POTASSIUM: CPT

## 2018-10-11 PROCEDURE — 87040 BLOOD CULTURE FOR BACTERIA: CPT

## 2018-10-11 PROCEDURE — 87633 RESP VIRUS 12-25 TARGETS: CPT

## 2018-10-11 PROCEDURE — 83615 LACTATE (LD) (LDH) ENZYME: CPT

## 2018-10-11 PROCEDURE — 81001 URINALYSIS AUTO W/SCOPE: CPT

## 2018-10-11 PROCEDURE — A9585: CPT

## 2018-10-11 PROCEDURE — 83690 ASSAY OF LIPASE: CPT

## 2018-10-11 PROCEDURE — 86850 RBC ANTIBODY SCREEN: CPT

## 2018-10-11 PROCEDURE — 80053 COMPREHEN METABOLIC PANEL: CPT

## 2018-10-11 PROCEDURE — 71045 X-RAY EXAM CHEST 1 VIEW: CPT

## 2018-10-11 PROCEDURE — 87070 CULTURE OTHR SPECIMN AEROBIC: CPT

## 2018-10-11 PROCEDURE — 86923 COMPATIBILITY TEST ELECTRIC: CPT

## 2018-10-11 PROCEDURE — 82803 BLOOD GASES ANY COMBINATION: CPT

## 2018-10-11 PROCEDURE — 82272 OCCULT BLD FECES 1-3 TESTS: CPT

## 2018-10-11 PROCEDURE — 87086 URINE CULTURE/COLONY COUNT: CPT

## 2018-10-11 PROCEDURE — 82728 ASSAY OF FERRITIN: CPT

## 2018-10-11 RX ORDER — PREGABALIN 225 MG/1
1 CAPSULE ORAL
Qty: 30 | Refills: 0
Start: 2018-10-11 | End: 2018-11-09

## 2018-10-11 RX ADMIN — PREGABALIN 1000 MICROGRAM(S): 225 CAPSULE ORAL at 11:38

## 2018-10-11 RX ADMIN — PANTOPRAZOLE SODIUM 40 MILLIGRAM(S): 20 TABLET, DELAYED RELEASE ORAL at 06:09

## 2018-10-11 RX ADMIN — HEPARIN SODIUM 5000 UNIT(S): 5000 INJECTION INTRAVENOUS; SUBCUTANEOUS at 17:31

## 2018-10-11 RX ADMIN — Medication 1 TABLET(S): at 11:38

## 2018-10-11 RX ADMIN — HEPARIN SODIUM 5000 UNIT(S): 5000 INJECTION INTRAVENOUS; SUBCUTANEOUS at 06:10

## 2018-10-11 NOTE — PROGRESS NOTE ADULT - SUBJECTIVE AND OBJECTIVE BOX
CHIEF COMPLAINT:Patient is a 67y old  Male who presents with a chief complaint of fevers (06 Oct 2018 12:02)  no acute events    PAST MEDICAL & SURGICAL HISTORY:  Chronic pancreatitis, unspecified pancreatitis type  No significant past surgical history          REVIEW OF SYSTEMS:  CONSTITUTIONAL:weak  EYES: No eye pain, visual disturbances, or discharge  NECK: No pain or stiffness  RESPIRATORY: No cough, wheezing, chills or hemoptysis; No Shortness of Breath  CARDIOVASCULAR: No chest pain, palpitations, passing out, dizziness, or leg swelling  GASTROINTESTINAL: No abdominal or epigastric pain. No nausea, vomiting, or hematemesis; No diarrhea or constipation. No melena or hematochezia.  GENITOURINARY: No dysuria, frequency, hematuria, or incontinence  NEUROLOGICAL: No headaches,   MUSCULOSKELETAL: No joint pain or swelling; No muscle, back, or extremity pain        Medications:  MEDICATIONS  (STANDING):  cyanocobalamin 1000 MICROGram(s) Oral daily  heparin  Injectable 5000 Unit(s) SubCutaneous every 12 hours  influenza   Vaccine 0.5 milliLiter(s) IntraMuscular once  multivitamin 1 Tablet(s) Oral daily  pantoprazole    Tablet 40 milliGRAM(s) Oral before breakfast    MEDICATIONS  (PRN):    	    PHYSICAL EXAM:  T(C): 36.9 (10-11-18 @ 08:26), Max: 37.1 (10-10-18 @ 16:30)  HR: 67 (10-11-18 @ 08:26) (67 - 97)  BP: 90/52 (10-11-18 @ 08:26) (90/52 - 98/64)  RR: 16 (10-11-18 @ 08:26) (16 - 18)  SpO2: 97% (10-11-18 @ 08:26) (97% - 98%)  Wt(kg): --  I&O's Summary    10 Oct 2018 07:01  -  11 Oct 2018 07:00  --------------------------------------------------------  IN: 920 mL / OUT: 900 mL / NET: 20 mL    11 Oct 2018 07:01  -  11 Oct 2018 12:30  --------------------------------------------------------  IN: 380 mL / OUT: 0 mL / NET: 380 mL      Appearance: Normal	  HEENT:   Normal oral mucosa, PERRL, EOMI	  Lymphatic: No lymphadenopathy  Cardiovascular: Normal S1 S2, No JVD, No murmurs, No edema  Respiratory: Lungs clear to auscultation	  Psychiatry: A & O x 3  Gastrointestinal:  Soft, Non-tender, + BS	  Skin: No rashes, No ecchymoses, No cyanosis	  Neurologic: Non-focal  Extremities: Normal range of motion, No clubbing, cyanosis or edema  Vascular: Peripheral pulses palpable 2+ bilaterally    LABS:	 	    CARDIAC MARKERS:                                10.0   13.92 )-----------( 807      ( 11 Oct 2018 08:10 )             34.2     10-11    135  |  96  |  19  ----------------------------<  105<H>  4.4   |  25  |  1.06    Ca    9.0      11 Oct 2018 06:55      proBNP:   Lipid Profile:   HgA1c:   TSH:

## 2018-10-11 NOTE — DISCHARGE NOTE ADULT - CARE PLAN
Principal Discharge DX:	Fever  Goal:	resolved  Assessment and plan of treatment:	f/u with GI  Secondary Diagnosis:	Chronic pancreatitis, unspecified pancreatitis type  Assessment and plan of treatment:	f/u with GI Principal Discharge DX:	Fever  Goal:	resolved  Assessment and plan of treatment:	Resolved  Secondary Diagnosis:	Chronic pancreatitis, unspecified pancreatitis type  Assessment and plan of treatment:	Pancreatitis is a condition which causes severe belly pain, irritated, or swollen Pancreas. The two main causes are gallstones or alcohol abuse.  Follow a low fat diet and avoid Alcohol (Avoid fried foods, desserts, whole milk dairy products, fatty meats)  If you are prescribed antibiotics, complete the entire course.  Follow up with your Gastroenterologist within 1-2 weeks of discharge.  Secondary Diagnosis:	Pseudocyst, pancreas  Assessment and plan of treatment:	Follow up with Dr. Green in 2-4 [phone] and at that time we will consider repeat imaging to assess resolution of the collection

## 2018-10-11 NOTE — DISCHARGE NOTE ADULT - HOSPITAL COURSE
1. Recurrent pancreatitis of unknown etiology: ddx most likely due to gallstones (seen on MR). Patient has pancreatic duct dilation as well.    2. Fluid collection: ddx includes WOPN  . given resolution of symptoms (fevers, s/p abx), the collection can be regarded as asymptomatic at this time 1. Recurrent pancreatitis of unknown etiology: ddx most likely due to gallstones (seen on MR). Patient has pancreatic duct dilation as well.    2. Fluid collection: ddx includes WOPN  . given resolution of symptoms (fevers, s/p abx), the collection can be regarded as asymptomatic at this time  pt reports had bx in [facility], no malignancy . Recurrent pancreatitis of unknown etiology: ddx most likely due to gallstones (seen on MR). Patient has pancreatic duct dilation as well.   Fluid collection: ddx includes WOPN  . given resolution of symptoms (fevers, s/p abx), the collection can be regarded as asymptomatic at this time  pt reports had bx in [facility], no malignancy  PV thrombus on MRI, however, US w/o thrombus  no plan for endoscopic intervention or collection drainage from GI at this point  outpt f/u and repeat imaging . Recurrent pancreatitis of unknown etiology: ddx most likely due to gallstones (seen on MR). Patient has pancreatic duct dilation as well. followed by ID and given course of IV antibiotics   Fluid collection: ddx includes WOPN  . given resolution of symptoms (fevers, s/p abx), the collection can be regarded as asymptomatic at this time  pt reports had bx in [facility], no malignancy  PV thrombus on MRI, however, US w/o thrombus  no plan for endoscopic intervention or collection drainage from GI at this point  outpt f/u and repeat imaging . Recurrent pancreatitis of unknown etiology: ddx most likely due to gallstones (seen on MR). Patient has pancreatic duct dilation as well. followed by ID and given course of IV antibiotics   Fluid collection: ddx includes WOPN  . given resolution of symptoms (fevers, s/p abx), the collection can be regarded as asymptomatic at this time  pt reports had bx in [facility], no malignancy  PV thrombus on MRI, however, US w/o thrombus  no plan for endoscopic intervention or collection drainage from GI at this point  outpt f/u and repeat imaging  no plan for surgical intervention

## 2018-10-11 NOTE — PROGRESS NOTE ADULT - SUBJECTIVE AND OBJECTIVE BOX
Chief Complaint:  FU (pt seen earlier today on rounds)    History of Present Illness:  The patient overall is doing okay.  He has no chest pain.  No pleuritic chest pain.  No abdominal pain.  No nausea.  No vomiting.  No diarrhea.  No obvious bleeding.  Has some mild fatigue.  No fevers.  Remainder ROS is stable. tolerating diet     MEDICATIONS  (STANDING):  cyanocobalamin 1000 MICROGram(s) Oral daily  heparin  Injectable 5000 Unit(s) SubCutaneous every 12 hours  influenza   Vaccine 0.5 milliLiter(s) IntraMuscular once  multivitamin 1 Tablet(s) Oral daily  pantoprazole    Tablet 40 milliGRAM(s) Oral before breakfast    MEDICATIONS  (PRN):      Allergies    No Known Allergies    Intolerances        Vital Signs Last 24 Hrs  T(C): 37.1 (11 Oct 2018 13:34), Max: 37.1 (10 Oct 2018 16:30)  T(F): 98.7 (11 Oct 2018 13:34), Max: 98.8 (11 Oct 2018 00:02)  HR: 75 (11 Oct 2018 13:34) (67 - 97)  BP: 90/50 (11 Oct 2018 13:34) (90/50 - 98/64)  BP(mean): --  RR: 16 (11 Oct 2018 13:34) (16 - 18)  SpO2: 97% (11 Oct 2018 13:34) (97% - 98%)    PHYSICAL EXAM  General: NAD  HEENT: clear oropharynx, anicteric sclera, pink conjunctiva  Neck: supple  CV: normal S1/S2  Lungs: clear to auscultation  Abdomen: soft non-tender non-distended, positive bowel sounds  Ext: no edema B/L LE    LABS:                          10.0   13.92 )-----------( 807      ( 11 Oct 2018 08:10 )             34.2         Mean Cell Volume : 88.6 fl  Mean Cell Hemoglobin : 25.9 pg  Mean Cell Hemoglobin Concentration : 29.2 gm/dL  Auto Neutrophil # : x  Auto Lymphocyte # : x  Auto Monocyte # : x  Auto Eosinophil # : x  Auto Basophil # : x  Auto Neutrophil % : x  Auto Lymphocyte % : x  Auto Monocyte % : x  Auto Eosinophil % : x  Auto Basophil % : x      Serial CBC's  10-11 @ 08:10  Hct-34.2 / Hgb-10.0 / Plat-807 / RBC-3.86 / WBC-13.92  Serial CBC's  10-10 @ 08:10  Hct-34.2 / Hgb-10.4 / Plat-741 / RBC-3.90 / WBC-13.05  Serial CBC's  10-09 @ 09:49  Hct-34.5 / Hgb-10.6 / Plat-703 / RBC-3.91 / WBC-13.06  Serial CBC's  10-08 @ 08:06  Hct-31.7 / Hgb-9.8 / Plat-666 / RBC-3.66 / WBC-12.40      10-11    135  |  96  |  19  ----------------------------<  105<H>  4.4   |  25  |  1.06    Ca    9.0      11 Oct 2018 06:55

## 2018-10-11 NOTE — DISCHARGE NOTE ADULT - CARE PROVIDERS DIRECT ADDRESSES
[email or web address]

## 2018-10-11 NOTE — PROGRESS NOTE ADULT - ASSESSMENT
66 yo male Cantonese speaking Male (used  phone)  with PMH of chronic pancreatitis who has had multiple bouts of pancreatitis was admitted with fever and vomiting blood on 9-28-18.     1.  Normocytic Anemia Secondary AOCD  -  Had  per patient never had a blood transfusion had 1st unit on 10-1-18 without issues  -  Recent hematemesis  -   Labs on 10-1-18 with iron 10, percent sat 8 and ferritin 560 c/w ACOD  -  Vitamin B12 429 and folate 6.7 with   -  EGD on 9-18-18 with duodenitis  -  hb stable at 10 on 10-11-18    2.  Chronic Pancreatitis  -   MRI abdomen/pelvis 9-30-18 acute on chronic pancreatitis.  6 cm peripherally enhancing collection.  Acute thrombosis of the main, right and left portal veins.   -   On SQ heparin  -   tolerating diet without pain  - GI/surg follow up for management    3.  Elevated Platelets  -   iron levels c/w AOCD  -   Likely reactive from pancreatitis, but trending up so LORI 2 sent and still pending.    4.  Fever  -   previously on IV zosyn - now off abx, ID following  -   Await quanteferon gold  per ID - is intermediate  -   TB ruled out, no cough, fever better    5.   Elevated WBC  -    will monitor has been fluctuating in the 12 -13 range  -    off abxs per ID

## 2018-10-11 NOTE — PROGRESS NOTE ADULT - ASSESSMENT
pt w hx recurrent pancreatitis w/ fever /leukocytosis   surg eval appreciated  pt reports had bx in [facility], no malignancy  possibly repeat bx/drainage, GI to f/u  PV thrombus on MRI, however, US w/o thrombus  no plan for endoscopic intervention or collection drainage from GI at this point  outpt f/u and repeat imaging  Surg to comment reg need for cholecystectomy in view of recurrent episodes of pancreatitis  Hb stable s/p PRBC, monitor  thrombocytosis - heme appreciated, likely reactive, monitor  PPI  diet as per gi   iv fluids  dvt proph  ppis  id eval noted  f/u cultures  AFB low yeild as pt not coughing  CT chest deferred at this time, pt off isolation as no signs to indicate pulm TB  monitor off abx per ID  WBC now stable off abx  If no plan for cholecystectomy from Surg - d/c home

## 2018-10-11 NOTE — DISCHARGE NOTE ADULT - PLAN OF CARE
resolved f/u with GI Resolved Pancreatitis is a condition which causes severe belly pain, irritated, or swollen Pancreas. The two main causes are gallstones or alcohol abuse.  Follow a low fat diet and avoid Alcohol (Avoid fried foods, desserts, whole milk dairy products, fatty meats)  If you are prescribed antibiotics, complete the entire course.  Follow up with your Gastroenterologist within 1-2 weeks of discharge. Follow up with Dr. Green in 2-4 [phone]) weeks and at that time we will consider repeat imaging to assess resolution of the collection

## 2018-10-11 NOTE — DISCHARGE NOTE ADULT - CARE PROVIDER_API CALL
Malik Hinojosa), Surgery  [address] [place] [address]  Phone: [phone]  Fax: [phone] Malik Hinojosa), Surgery  [address] [place] [address]  Phone: [phone]  Fax: [phone]    Jerrell Green), Gastroenterology; Internal Medicine  [address] [place] [id number]  Phone: [phone]  Fax: [phone]

## 2018-10-11 NOTE — CHART NOTE - NSCHARTNOTEFT_GEN_A_CORE
HEPATOBILIARY SURGERY CHART NOTE    Currently no planned surgical intervention. Can follow up with Dr. Hinojosa and gastroenterology as an outpatient HEPATOBILIARY SURGERY CHART NOTE    Currently no planned surgical intervention. Still recommend GI/ IR intervention prior to any surgery.  Patient can follow up with Dr. Hinojosa.

## 2018-10-11 NOTE — DISCHARGE NOTE ADULT - MEDICATION SUMMARY - MEDICATIONS TO TAKE
I will START or STAY ON the medications listed below when I get home from the hospital:    Protonix 40 mg oral delayed release tablet  -- 1 tab(s) by mouth 2 times a day  -- Indication: For GI    Multiple Vitamins oral tablet  -- 1 tab(s) by mouth once a day  -- Indication: For supplement    cyanocobalamin 1000 mcg oral tablet  -- 1 tab(s) by mouth once a day  -- Indication: For supplement

## 2018-10-11 NOTE — DISCHARGE NOTE ADULT - MEDICATION SUMMARY - MEDICATIONS TO STOP TAKING
I will STOP taking the medications listed below when I get home from the hospital:    levoFLOXacin 500 mg oral tablet  -- 1 tab(s) by mouth once a day (for 10 days)    metroNIDAZOLE 500 mg oral tablet  -- 1 tab(s) by mouth 3 times a day

## 2018-10-15 ENCOUNTER — APPOINTMENT (OUTPATIENT)
Dept: GASTROENTEROLOGY | Facility: CLINIC | Age: 68
End: 2018-10-15

## 2018-10-16 ENCOUNTER — APPOINTMENT (OUTPATIENT)
Dept: SURGERY | Facility: CLINIC | Age: 68
End: 2018-10-16

## 2018-10-16 VITALS
HEIGHT: 66 IN | HEART RATE: 77 BPM | BODY MASS INDEX: 19.29 KG/M2 | SYSTOLIC BLOOD PRESSURE: 98 MMHG | OXYGEN SATURATION: 98 % | DIASTOLIC BLOOD PRESSURE: 64 MMHG | WEIGHT: 120 LBS

## 2018-10-23 RX ORDER — DEXTROSE MONOHYDRATE 25000 MG/500ML
5 INJECTION, SOLUTION INTRAVENOUS
Refills: 0 | Status: ACTIVE | COMMUNITY

## 2018-10-23 RX ORDER — POTASSIUM CHLORIDE 1500 MG/1
20 TABLET, FILM COATED, EXTENDED RELEASE ORAL
Refills: 0 | Status: ACTIVE | COMMUNITY

## 2018-10-23 RX ORDER — DOCUSATE SODIUM 100 MG/1
CAPSULE ORAL
Refills: 0 | Status: ACTIVE | COMMUNITY

## 2018-10-23 RX ORDER — PIPERACILLIN SODIUM AND TAZOBACTAM SODIUM 4; .5 G/20ML; G/20ML
4.5 (4-0.5) INJECTION, POWDER, LYOPHILIZED, FOR SOLUTION INTRAVENOUS
Refills: 0 | Status: ACTIVE | COMMUNITY

## 2018-10-23 RX ORDER — PANCRELIPASE 15000; 3000; 9500 [USP'U]/1; [USP'U]/1; [USP'U]/1
3000-9500 CAPSULE, DELAYED RELEASE ORAL
Refills: 0 | Status: ACTIVE | COMMUNITY

## 2018-10-23 RX ORDER — PREDNISONE 10 MG/1
10 TABLET ORAL
Refills: 0 | Status: ACTIVE | COMMUNITY

## 2018-11-21 LAB
CULTURE RESULTS: SIGNIFICANT CHANGE UP
SPECIMEN SOURCE: SIGNIFICANT CHANGE UP

## 2018-11-24 LAB
CULTURE RESULTS: SIGNIFICANT CHANGE UP
SPECIMEN SOURCE: SIGNIFICANT CHANGE UP

## 2018-11-27 ENCOUNTER — APPOINTMENT (OUTPATIENT)
Dept: SURGERY | Facility: CLINIC | Age: 68
End: 2018-11-27
Payer: COMMERCIAL

## 2018-11-27 VITALS
WEIGHT: 132 LBS | DIASTOLIC BLOOD PRESSURE: 80 MMHG | OXYGEN SATURATION: 98 % | HEART RATE: 72 BPM | HEIGHT: 66 IN | BODY MASS INDEX: 21.21 KG/M2 | SYSTOLIC BLOOD PRESSURE: 115 MMHG

## 2018-11-27 PROCEDURE — 99213 OFFICE O/P EST LOW 20 MIN: CPT

## 2018-12-06 ENCOUNTER — APPOINTMENT (OUTPATIENT)
Dept: GASTROENTEROLOGY | Facility: CLINIC | Age: 68
End: 2018-12-06

## 2018-12-22 ENCOUNTER — APPOINTMENT (OUTPATIENT)
Dept: MRI IMAGING | Facility: CLINIC | Age: 68
End: 2018-12-22
Payer: COMMERCIAL

## 2018-12-22 ENCOUNTER — OUTPATIENT (OUTPATIENT)
Dept: OUTPATIENT SERVICES | Facility: HOSPITAL | Age: 68
LOS: 1 days | End: 2018-12-22
Payer: COMMERCIAL

## 2018-12-22 DIAGNOSIS — K85.91 ACUTE PANCREATITIS WITH UNINFECTED NECROSIS, UNSPECIFIED: ICD-10-CM

## 2018-12-22 PROCEDURE — A9585: CPT

## 2018-12-22 PROCEDURE — 82565 ASSAY OF CREATININE: CPT

## 2018-12-22 PROCEDURE — 74183 MRI ABD W/O CNTR FLWD CNTR: CPT | Mod: 26

## 2018-12-22 PROCEDURE — 74183 MRI ABD W/O CNTR FLWD CNTR: CPT

## 2019-01-07 ENCOUNTER — INBOUND DOCUMENT (OUTPATIENT)
Age: 69
End: 2019-01-07

## 2019-01-09 RX ORDER — WARFARIN SODIUM 2.5 MG/1
2.5 TABLET ORAL
Refills: 0 | Status: DISCONTINUED | COMMUNITY
End: 2019-01-09

## 2019-01-09 RX ORDER — PANTOPRAZOLE 40 MG/1
40 TABLET, DELAYED RELEASE ORAL
Refills: 0 | Status: DISCONTINUED | COMMUNITY
End: 2019-01-09

## 2019-01-24 ENCOUNTER — RESULT REVIEW (OUTPATIENT)
Age: 69
End: 2019-01-24

## 2019-01-24 ENCOUNTER — APPOINTMENT (OUTPATIENT)
Dept: GASTROENTEROLOGY | Facility: HOSPITAL | Age: 69
End: 2019-01-24

## 2019-01-24 ENCOUNTER — OUTPATIENT (OUTPATIENT)
Dept: OUTPATIENT SERVICES | Facility: HOSPITAL | Age: 69
LOS: 1 days | End: 2019-01-24
Payer: COMMERCIAL

## 2019-01-24 DIAGNOSIS — K86.9 DISEASE OF PANCREAS, UNSPECIFIED: ICD-10-CM

## 2019-01-24 PROCEDURE — 43242 EGD US FINE NEEDLE BX/ASPIR: CPT

## 2019-01-24 PROCEDURE — 88305 TISSUE EXAM BY PATHOLOGIST: CPT | Mod: 26

## 2019-01-24 PROCEDURE — 88173 CYTOPATH EVAL FNA REPORT: CPT

## 2019-01-24 PROCEDURE — 88305 TISSUE EXAM BY PATHOLOGIST: CPT

## 2019-01-24 PROCEDURE — 43242 EGD US FINE NEEDLE BX/ASPIR: CPT | Mod: GC

## 2019-01-24 PROCEDURE — 88173 CYTOPATH EVAL FNA REPORT: CPT | Mod: 26

## 2019-01-24 NOTE — PRE-ANESTHESIA EVALUATION ADULT - NSANTHOSAYNRD_GEN_A_CORE
No. TRU screening performed.  STOP BANG Legend: 0-2 = LOW Risk; 3-4 = INTERMEDIATE Risk; 5-8 = HIGH Risk

## 2019-02-04 NOTE — PHYSICAL THERAPY INITIAL EVALUATION ADULT - NAME OF DISCHARGE PLANNER
SHEA Coco Cartilage Graft Text: The defect edges were debeveled with a #15 scalpel blade.  Given the location of the defect, shape of the defect, the fact the defect involved a full thickness cartilage defect a cartilage graft was deemed most appropriate.  An appropriate donor site was identified, cleansed, and anesthetized. The cartilage graft was then harvested and transferred to the recipient site, oriented appropriately and then sutured into place.  The secondary defect was then repaired using a primary closure.

## 2019-02-19 ENCOUNTER — APPOINTMENT (OUTPATIENT)
Dept: SURGERY | Facility: CLINIC | Age: 69
End: 2019-02-19
Payer: COMMERCIAL

## 2019-02-19 VITALS
DIASTOLIC BLOOD PRESSURE: 59 MMHG | WEIGHT: 160 LBS | HEIGHT: 66 IN | BODY MASS INDEX: 25.71 KG/M2 | HEART RATE: 72 BPM | SYSTOLIC BLOOD PRESSURE: 111 MMHG | RESPIRATION RATE: 16 BRPM | OXYGEN SATURATION: 98 %

## 2019-02-19 VITALS — BODY MASS INDEX: 22.44 KG/M2 | WEIGHT: 139 LBS

## 2019-02-19 PROCEDURE — 99213 OFFICE O/P EST LOW 20 MIN: CPT

## 2019-02-20 NOTE — HISTORY OF PRESENT ILLNESS
[de-identified] : The patient is s/p episode of severe necrotizing pancreatitis. 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

## 2019-03-28 ENCOUNTER — APPOINTMENT (OUTPATIENT)
Dept: GASTROENTEROLOGY | Facility: CLINIC | Age: 69
End: 2019-03-28

## 2019-04-09 ENCOUNTER — OUTPATIENT (OUTPATIENT)
Dept: OUTPATIENT SERVICES | Facility: HOSPITAL | Age: 69
LOS: 1 days | End: 2019-04-09
Payer: COMMERCIAL

## 2019-04-09 ENCOUNTER — APPOINTMENT (OUTPATIENT)
Dept: GASTROENTEROLOGY | Facility: HOSPITAL | Age: 69
End: 2019-04-09

## 2019-04-09 DIAGNOSIS — K85.12 BILIARY ACUTE PANCREATITIS WITH INFECTED NECROSIS: ICD-10-CM

## 2019-04-09 PROCEDURE — 43262 ENDO CHOLANGIOPANCREATOGRAPH: CPT | Mod: GC

## 2019-04-09 PROCEDURE — 74330 X-RAY BILE/PANC ENDOSCOPY: CPT

## 2019-04-09 PROCEDURE — 43262 ENDO CHOLANGIOPANCREATOGRAPH: CPT

## 2019-04-09 PROCEDURE — C1769: CPT

## 2019-04-09 PROCEDURE — 74330 X-RAY BILE/PANC ENDOSCOPY: CPT | Mod: 26,GC

## 2019-04-23 ENCOUNTER — APPOINTMENT (OUTPATIENT)
Dept: SURGERY | Facility: CLINIC | Age: 69
End: 2019-04-23
Payer: COMMERCIAL

## 2019-04-23 VITALS
RESPIRATION RATE: 16 BRPM | DIASTOLIC BLOOD PRESSURE: 65 MMHG | HEIGHT: 66 IN | WEIGHT: 139 LBS | HEART RATE: 73 BPM | SYSTOLIC BLOOD PRESSURE: 107 MMHG | BODY MASS INDEX: 22.34 KG/M2 | OXYGEN SATURATION: 99 %

## 2019-04-23 PROCEDURE — 99213 OFFICE O/P EST LOW 20 MIN: CPT

## 2019-04-30 NOTE — HISTORY OF PRESENT ILLNESS
[de-identified] : Patient known to me recovering from severe necrotizing pancreatitis. 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 Also shows gallstones. Patient claims to be asymptomatic. Will defer surgery.\par MRI/MRCP shows no gallstones.Will need to confirm.\par We discussed cholecystectomy.\par Recommend abdominal USG.\par CA 19-9, A1c, \par RTC after

## 2019-04-30 NOTE — ASSESSMENT
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 Asymptomatic. Unclear if gallstones are present. \par Schedule USG\par CA 19-9, A1c, \par RTC after

## 2019-06-07 ENCOUNTER — APPOINTMENT (OUTPATIENT)
Dept: ULTRASOUND IMAGING | Facility: CLINIC | Age: 69
End: 2019-06-07
Payer: COMMERCIAL

## 2019-06-07 ENCOUNTER — OUTPATIENT (OUTPATIENT)
Dept: OUTPATIENT SERVICES | Facility: HOSPITAL | Age: 69
LOS: 1 days | End: 2019-06-07
Payer: COMMERCIAL

## 2019-06-07 DIAGNOSIS — Z00.8 ENCOUNTER FOR OTHER GENERAL EXAMINATION: ICD-10-CM

## 2019-06-07 PROCEDURE — 76705 ECHO EXAM OF ABDOMEN: CPT

## 2019-06-07 PROCEDURE — 76705 ECHO EXAM OF ABDOMEN: CPT | Mod: 26

## 2019-06-11 ENCOUNTER — APPOINTMENT (OUTPATIENT)
Dept: SURGERY | Facility: CLINIC | Age: 69
End: 2019-06-11
Payer: COMMERCIAL

## 2019-06-11 ENCOUNTER — OUTPATIENT (OUTPATIENT)
Dept: OUTPATIENT SERVICES | Facility: HOSPITAL | Age: 69
LOS: 1 days | End: 2019-06-11
Payer: COMMERCIAL

## 2019-06-11 VITALS
TEMPERATURE: 98 F | RESPIRATION RATE: 18 BRPM | OXYGEN SATURATION: 97 % | DIASTOLIC BLOOD PRESSURE: 67 MMHG | WEIGHT: 141.98 LBS | SYSTOLIC BLOOD PRESSURE: 107 MMHG | HEART RATE: 71 BPM | HEIGHT: 66 IN

## 2019-06-11 VITALS
BODY MASS INDEX: 23.01 KG/M2 | WEIGHT: 143.2 LBS | HEART RATE: 69 BPM | TEMPERATURE: 97.9 F | SYSTOLIC BLOOD PRESSURE: 115 MMHG | DIASTOLIC BLOOD PRESSURE: 74 MMHG | OXYGEN SATURATION: 98 % | HEIGHT: 66 IN

## 2019-06-11 DIAGNOSIS — Z98.890 OTHER SPECIFIED POSTPROCEDURAL STATES: Chronic | ICD-10-CM

## 2019-06-11 DIAGNOSIS — K86.89 OTHER SPECIFIED DISEASES OF PANCREAS: ICD-10-CM

## 2019-06-11 DIAGNOSIS — K86.9 DISEASE OF PANCREAS, UNSPECIFIED: ICD-10-CM

## 2019-06-11 DIAGNOSIS — Z29.9 ENCOUNTER FOR PROPHYLACTIC MEASURES, UNSPECIFIED: ICD-10-CM

## 2019-06-11 DIAGNOSIS — R93.3 ABNORMAL FINDINGS ON DIAGNOSTIC IMAGING OF OTHER PARTS OF DIGESTIVE TRACT: Chronic | ICD-10-CM

## 2019-06-11 LAB
ALBUMIN SERPL ELPH-MCNC: 4 G/DL — SIGNIFICANT CHANGE UP (ref 3.3–5)
ALP SERPL-CCNC: 90 U/L — SIGNIFICANT CHANGE UP (ref 40–120)
ALT FLD-CCNC: 18 U/L — SIGNIFICANT CHANGE UP (ref 10–45)
ANION GAP SERPL CALC-SCNC: 13 MMOL/L — SIGNIFICANT CHANGE UP (ref 5–17)
AST SERPL-CCNC: 19 U/L — SIGNIFICANT CHANGE UP (ref 10–40)
BILIRUB SERPL-MCNC: 0.4 MG/DL — SIGNIFICANT CHANGE UP (ref 0.2–1.2)
BLD GP AB SCN SERPL QL: NEGATIVE — SIGNIFICANT CHANGE UP
BUN SERPL-MCNC: 15 MG/DL — SIGNIFICANT CHANGE UP (ref 7–23)
CALCIUM SERPL-MCNC: 9.7 MG/DL — SIGNIFICANT CHANGE UP (ref 8.4–10.5)
CHLORIDE SERPL-SCNC: 104 MMOL/L — SIGNIFICANT CHANGE UP (ref 96–108)
CO2 SERPL-SCNC: 23 MMOL/L — SIGNIFICANT CHANGE UP (ref 22–31)
CREAT SERPL-MCNC: 0.98 MG/DL — SIGNIFICANT CHANGE UP (ref 0.5–1.3)
GLUCOSE SERPL-MCNC: 136 MG/DL — HIGH (ref 70–99)
POTASSIUM SERPL-MCNC: 4.1 MMOL/L — SIGNIFICANT CHANGE UP (ref 3.5–5.3)
POTASSIUM SERPL-SCNC: 4.1 MMOL/L — SIGNIFICANT CHANGE UP (ref 3.5–5.3)
PROT SERPL-MCNC: 7.3 G/DL — SIGNIFICANT CHANGE UP (ref 6–8.3)
RH IG SCN BLD-IMP: POSITIVE — SIGNIFICANT CHANGE UP
SODIUM SERPL-SCNC: 140 MMOL/L — SIGNIFICANT CHANGE UP (ref 135–145)

## 2019-06-11 PROCEDURE — 86850 RBC ANTIBODY SCREEN: CPT

## 2019-06-11 PROCEDURE — 86901 BLOOD TYPING SEROLOGIC RH(D): CPT

## 2019-06-11 PROCEDURE — G0463: CPT

## 2019-06-11 PROCEDURE — 86900 BLOOD TYPING SEROLOGIC ABO: CPT

## 2019-06-11 PROCEDURE — 85027 COMPLETE CBC AUTOMATED: CPT

## 2019-06-11 PROCEDURE — 80053 COMPREHEN METABOLIC PANEL: CPT

## 2019-06-11 PROCEDURE — 99215 OFFICE O/P EST HI 40 MIN: CPT

## 2019-06-11 RX ORDER — CEFOTETAN DISODIUM 1 G
2 VIAL (EA) INJECTION ONCE
Refills: 0 | Status: DISCONTINUED | OUTPATIENT
Start: 2019-06-14 | End: 2019-06-14

## 2019-06-11 NOTE — H&P PST ADULT - ASSESSMENT
CAPRINI SCORE [CLOT updated 18]    AGE RELATED RISK FACTORS                                                       MOBILITY RELATED FACTORS  [ ] Age 41-60 years                                            (1 Point)                    [ ] Bed rest                                                        (1 Point)  [x ] Age: 61-74 years                                           (2 Points)                  [ ] Plaster cast                                                   (2 Points)  [ ] Age= 75 years                                              (3 Points)                    [ ] Bed bound for more than 72 hours                 (2 Points)    DISEASE RELATED RISK FACTORS                                               GENDER SPECIFIC FACTORS  [ ] Edema in the lower extremities                       (1 Point)              [ ] Pregnancy                                                     (1 Point)  [ ] Varicose veins                                               (1 Point)                     [ ] Post-partum < 6 weeks                                   (1 Point)             [ ] BMI > 25 Kg/m2                                            (1 Point)                     [ ] Hormonal therapy  or oral contraception          (1 Point)                 [ ] Sepsis (in the previous month)                        (1 Point)               [ ] History of pregnancy complications                 (1 point)  [ ] Pneumonia or serious lung disease                                               [ ] Unexplained or recurrent                     (1 Point)           (in the previous month)                               (1 Point)  [ ] Abnormal pulmonary function test                     (1 Point)                 SURGERY RELATED RISK FACTORS  [ ] Acute myocardial infarction                              (1 Point)               [ ]  Section                                             (1 Point)  [ ] Congestive heart failure (in the previous month)  (1 Point)      [ ] Minor surgery                                                  (1 Point)   [ ] Inflammatory bowel disease                             (1 Point)               [ ] Arthroscopic surgery                                        (2 Points)  [ ] Central venous access                                      (2 Points)                [x ] General surgery lasting more than 45 minutes (2 points)  [ ] Present or previous malignancy                     (2 Points)                [ ] Elective arthroplasty                                         (5 points)    [ ] Stroke (in the previous month)                          (5 Points)                                                                                                                                                           HEMATOLOGY RELATED FACTORS                                                 TRAUMA RELATED RISK FACTORS  [ ] Prior episodes of VTE                                     (3 Points)                [ ] Fracture of the hip, pelvis, or leg                       (5 Points)  [ ] Positive family history for VTE                         (3 Points)             [ ] Acute spinal cord injury (in the previous month)  (5 Points)  [ ] Prothrombin 55538 A                                     (3 Points)               [ ] Paralysis  (less than 1 month)                             (5 Points)  [ ] Factor V Leiden                                             (3 Points)                  [ ] Multiple Trauma within 1 month                        (5 Points)  [ ] Lupus anticoagulants                                     (3 Points)                                                           [ ] Anticardiolipin antibodies                               (3 Points)                                                       [ ] High homocysteine in the blood                      (3 Points)                                             [ ] Other congenital or acquired thrombophilia      (3 Points)                                                [ ] Heparin induced thrombocytopenia                  (3 Points)                                     Total Score [    6      ]

## 2019-06-11 NOTE — H&P PST ADULT - NSICDXPASTSURGICALHX_GEN_ALL_CORE_FT
PAST SURGICAL HISTORY:  No significant past surgical history PAST SURGICAL HISTORY:  Abnormal magnetic resonance cholangiopancreatography (MRCP) 1/2019    H/O endoscopy 2018    History of ERCP 1/2019,

## 2019-06-11 NOTE — H&P PST ADULT - NSICDXPROBLEM_GEN_ALL_CORE_FT
PROBLEM DIAGNOSES  Problem: Other specified diseases of pancreas  Assessment and Plan: Pancreaticojejunostomy of Body/tail of pancreas on 6/14/19.  Pre- Op instructions discussed   Labs sent     Problem: Prophylactic measure  Assessment and Plan: The Caprini score indicates that this patient is at high risk for a VTE event (score 6 or greater). Surgical patients in this group will benefit from both pharmacologic prophylaxis and intermittent compression devices.  The surgical team will determine the balance between VTE risk and bleeding risk, and other clinical considerations

## 2019-06-11 NOTE — H&P PST ADULT - NEGATIVE GASTROINTESTINAL SYMPTOMS
no melena/no diarrhea/no vomiting/no constipation/no nausea/no hematochezia/no change in bowel habits/no steatorrhea/no flatulence

## 2019-06-11 NOTE — H&P PST ADULT - HISTORY OF PRESENT ILLNESS
67 year-old Cantonese speaking gentleman with pmhx necrotizing pancreatitis, splenic vein thrombosis, likely secondary to his pancreatitis treated with coumadin , for which he was prescribed coumadin. 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 He denies any alcohol/drug use. Denies fever/chills, CP, SOB, palpitations, melena, BRBPR, hematuria, back pain. 68 year-old Cantonese speaking gentleman with pmhx necrotizing pancreatitis, splenic vein thrombosis, likely secondary to his pancreatitis treated with coumadin in the past. 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 He denies any alcohol/drug use. Denies fever/chills, CP, SOB, palpitations, melena, BRBPR, hematuria, back pain. 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 Presents to PST for scheduled  Pancreaticojejunostomy of body/Tail of Pancreas on 6/14/19.  Pt denies any nausea , vomiting, abdominal pain, fever or chills.

## 2019-06-11 NOTE — H&P PST ADULT - NSICDXPASTMEDICALHX_GEN_ALL_CORE_FT
PAST MEDICAL HISTORY:  Chronic pancreatitis, unspecified pancreatitis type PAST MEDICAL HISTORY:  Chronic pancreatitis, unspecified pancreatitis type     Pancreatic duct dilated

## 2019-06-12 LAB
HCT VFR BLD CALC: 45.4 % — SIGNIFICANT CHANGE UP (ref 39–50)
HGB BLD-MCNC: 14.1 G/DL — SIGNIFICANT CHANGE UP (ref 13–17)
MCHC RBC-ENTMCNC: 28.3 PG — SIGNIFICANT CHANGE UP (ref 27–34)
MCHC RBC-ENTMCNC: 31.1 GM/DL — LOW (ref 32–36)
MCV RBC AUTO: 91 FL — SIGNIFICANT CHANGE UP (ref 80–100)
PLATELET # BLD AUTO: 331 K/UL — SIGNIFICANT CHANGE UP (ref 150–400)
RBC # BLD: 4.99 M/UL — SIGNIFICANT CHANGE UP (ref 4.2–5.8)
RBC # FLD: 13.8 % — SIGNIFICANT CHANGE UP (ref 10.3–14.5)
WBC # BLD: 7.29 K/UL — SIGNIFICANT CHANGE UP (ref 3.8–10.5)
WBC # FLD AUTO: 7.29 K/UL — SIGNIFICANT CHANGE UP (ref 3.8–10.5)

## 2019-06-13 ENCOUNTER — TRANSCRIPTION ENCOUNTER (OUTPATIENT)
Age: 69
End: 2019-06-13

## 2019-06-14 ENCOUNTER — INPATIENT (INPATIENT)
Facility: HOSPITAL | Age: 69
LOS: 4 days | Discharge: HOME | DRG: 406 | End: 2019-06-19
Attending: SURGERY | Admitting: SURGERY
Payer: COMMERCIAL

## 2019-06-14 ENCOUNTER — APPOINTMENT (OUTPATIENT)
Dept: SURGERY | Facility: HOSPITAL | Age: 69
End: 2019-06-14

## 2019-06-14 VITALS
SYSTOLIC BLOOD PRESSURE: 118 MMHG | RESPIRATION RATE: 16 BRPM | OXYGEN SATURATION: 99 % | HEART RATE: 69 BPM | TEMPERATURE: 98 F | DIASTOLIC BLOOD PRESSURE: 75 MMHG | HEIGHT: 66 IN | WEIGHT: 139.99 LBS

## 2019-06-14 DIAGNOSIS — K86.9 DISEASE OF PANCREAS, UNSPECIFIED: ICD-10-CM

## 2019-06-14 DIAGNOSIS — Z98.890 OTHER SPECIFIED POSTPROCEDURAL STATES: Chronic | ICD-10-CM

## 2019-06-14 DIAGNOSIS — R93.3 ABNORMAL FINDINGS ON DIAGNOSTIC IMAGING OF OTHER PARTS OF DIGESTIVE TRACT: Chronic | ICD-10-CM

## 2019-06-14 LAB
ALBUMIN SERPL ELPH-MCNC: 5.9 G/DL — HIGH (ref 3.3–5)
ALP SERPL-CCNC: 65 U/L — SIGNIFICANT CHANGE UP (ref 40–120)
ALT FLD-CCNC: 13 U/L — SIGNIFICANT CHANGE UP (ref 10–45)
ANION GAP SERPL CALC-SCNC: 20 MMOL/L — HIGH (ref 5–17)
AST SERPL-CCNC: 14 U/L — SIGNIFICANT CHANGE UP (ref 10–40)
BILIRUB DIRECT SERPL-MCNC: 0.1 MG/DL — SIGNIFICANT CHANGE UP (ref 0–0.2)
BILIRUB INDIRECT FLD-MCNC: 0.5 MG/DL — SIGNIFICANT CHANGE UP (ref 0.2–1)
BILIRUB SERPL-MCNC: 0.6 MG/DL — SIGNIFICANT CHANGE UP (ref 0.2–1.2)
BUN SERPL-MCNC: 14 MG/DL — SIGNIFICANT CHANGE UP (ref 7–23)
CALCIUM SERPL-MCNC: 6.7 MG/DL — LOW (ref 8.4–10.5)
CHLORIDE SERPL-SCNC: 97 MMOL/L — SIGNIFICANT CHANGE UP (ref 96–108)
CO2 SERPL-SCNC: 22 MMOL/L — SIGNIFICANT CHANGE UP (ref 22–31)
CREAT SERPL-MCNC: 0.86 MG/DL — SIGNIFICANT CHANGE UP (ref 0.5–1.3)
GLUCOSE SERPL-MCNC: 185 MG/DL — HIGH (ref 70–99)
HCT VFR BLD CALC: 36.2 % — LOW (ref 39–50)
HCT VFR BLD CALC: 40.1 % — SIGNIFICANT CHANGE UP (ref 39–50)
HGB BLD-MCNC: 12.1 G/DL — LOW (ref 13–17)
HGB BLD-MCNC: 13.4 G/DL — SIGNIFICANT CHANGE UP (ref 13–17)
MCHC RBC-ENTMCNC: 30.1 PG — SIGNIFICANT CHANGE UP (ref 27–34)
MCHC RBC-ENTMCNC: 30.3 PG — SIGNIFICANT CHANGE UP (ref 27–34)
MCHC RBC-ENTMCNC: 33.5 GM/DL — SIGNIFICANT CHANGE UP (ref 32–36)
MCHC RBC-ENTMCNC: 33.5 GM/DL — SIGNIFICANT CHANGE UP (ref 32–36)
MCV RBC AUTO: 89.9 FL — SIGNIFICANT CHANGE UP (ref 80–100)
MCV RBC AUTO: 90.5 FL — SIGNIFICANT CHANGE UP (ref 80–100)
PLATELET # BLD AUTO: 262 K/UL — SIGNIFICANT CHANGE UP (ref 150–400)
PLATELET # BLD AUTO: 302 K/UL — SIGNIFICANT CHANGE UP (ref 150–400)
POTASSIUM SERPL-MCNC: 4.1 MMOL/L — SIGNIFICANT CHANGE UP (ref 3.5–5.3)
POTASSIUM SERPL-SCNC: 4.1 MMOL/L — SIGNIFICANT CHANGE UP (ref 3.5–5.3)
PROT SERPL-MCNC: 8.2 G/DL — SIGNIFICANT CHANGE UP (ref 6–8.3)
RBC # BLD: 4.02 M/UL — LOW (ref 4.2–5.8)
RBC # BLD: 4.43 M/UL — SIGNIFICANT CHANGE UP (ref 4.2–5.8)
RBC # FLD: 12.5 % — SIGNIFICANT CHANGE UP (ref 10.3–14.5)
RBC # FLD: 12.7 % — SIGNIFICANT CHANGE UP (ref 10.3–14.5)
SODIUM SERPL-SCNC: 139 MMOL/L — SIGNIFICANT CHANGE UP (ref 135–145)
WBC # BLD: 11.5 K/UL — HIGH (ref 3.8–10.5)
WBC # BLD: 14.6 K/UL — HIGH (ref 3.8–10.5)
WBC # FLD AUTO: 11.5 K/UL — HIGH (ref 3.8–10.5)
WBC # FLD AUTO: 14.6 K/UL — HIGH (ref 3.8–10.5)

## 2019-06-14 PROCEDURE — 48548 FUSE PANCREAS AND BOWEL: CPT | Mod: 22

## 2019-06-14 RX ORDER — ONDANSETRON 8 MG/1
4 TABLET, FILM COATED ORAL ONCE
Refills: 0 | Status: DISCONTINUED | OUTPATIENT
Start: 2019-06-14 | End: 2019-06-14

## 2019-06-14 RX ORDER — ACETAMINOPHEN 500 MG
1000 TABLET ORAL ONCE
Refills: 0 | Status: COMPLETED | OUTPATIENT
Start: 2019-06-15 | End: 2019-06-15

## 2019-06-14 RX ORDER — ONDANSETRON 8 MG/1
4 TABLET, FILM COATED ORAL EVERY 6 HOURS
Refills: 0 | Status: DISCONTINUED | OUTPATIENT
Start: 2019-06-14 | End: 2019-06-14

## 2019-06-14 RX ORDER — HYDROMORPHONE HYDROCHLORIDE 2 MG/ML
1 INJECTION INTRAMUSCULAR; INTRAVENOUS; SUBCUTANEOUS
Refills: 0 | Status: DISCONTINUED | OUTPATIENT
Start: 2019-06-14 | End: 2019-06-14

## 2019-06-14 RX ORDER — ACETAMINOPHEN 500 MG
1000 TABLET ORAL ONCE
Refills: 0 | Status: COMPLETED | OUTPATIENT
Start: 2019-06-15 | End: 2019-06-14

## 2019-06-14 RX ORDER — LIDOCAINE HCL 20 MG/ML
0.2 VIAL (ML) INJECTION ONCE
Refills: 0 | Status: DISCONTINUED | OUTPATIENT
Start: 2019-06-14 | End: 2019-06-14

## 2019-06-14 RX ORDER — ENOXAPARIN SODIUM 100 MG/ML
40 INJECTION SUBCUTANEOUS DAILY
Refills: 0 | Status: DISCONTINUED | OUTPATIENT
Start: 2019-06-14 | End: 2019-06-17

## 2019-06-14 RX ORDER — ONDANSETRON 8 MG/1
4 TABLET, FILM COATED ORAL EVERY 6 HOURS
Refills: 0 | Status: DISCONTINUED | OUTPATIENT
Start: 2019-06-14 | End: 2019-06-19

## 2019-06-14 RX ORDER — ERYTHROMYCIN ETHYLSUCCINATE 400 MG
500 TABLET ORAL EVERY 6 HOURS
Refills: 0 | Status: DISCONTINUED | OUTPATIENT
Start: 2019-06-14 | End: 2019-06-17

## 2019-06-14 RX ORDER — SODIUM CHLORIDE 9 MG/ML
1000 INJECTION, SOLUTION INTRAVENOUS
Refills: 0 | Status: DISCONTINUED | OUTPATIENT
Start: 2019-06-14 | End: 2019-06-16

## 2019-06-14 RX ORDER — ALBUMIN HUMAN 25 %
200 VIAL (ML) INTRAVENOUS EVERY 6 HOURS
Refills: 0 | Status: DISCONTINUED | OUTPATIENT
Start: 2019-06-14 | End: 2019-06-17

## 2019-06-14 RX ORDER — HYDROMORPHONE HYDROCHLORIDE 2 MG/ML
30 INJECTION INTRAMUSCULAR; INTRAVENOUS; SUBCUTANEOUS
Refills: 0 | Status: DISCONTINUED | OUTPATIENT
Start: 2019-06-14 | End: 2019-06-18

## 2019-06-14 RX ORDER — HYDROMORPHONE HYDROCHLORIDE 2 MG/ML
0.5 INJECTION INTRAMUSCULAR; INTRAVENOUS; SUBCUTANEOUS
Refills: 0 | Status: DISCONTINUED | OUTPATIENT
Start: 2019-06-14 | End: 2019-06-14

## 2019-06-14 RX ORDER — NALOXONE HYDROCHLORIDE 4 MG/.1ML
0.1 SPRAY NASAL
Refills: 0 | Status: DISCONTINUED | OUTPATIENT
Start: 2019-06-14 | End: 2019-06-18

## 2019-06-14 RX ORDER — CHLORHEXIDINE GLUCONATE 213 G/1000ML
1 SOLUTION TOPICAL DAILY
Refills: 0 | Status: DISCONTINUED | OUTPATIENT
Start: 2019-06-14 | End: 2019-06-14

## 2019-06-14 RX ORDER — SODIUM CHLORIDE 9 MG/ML
3 INJECTION INTRAMUSCULAR; INTRAVENOUS; SUBCUTANEOUS EVERY 8 HOURS
Refills: 0 | Status: DISCONTINUED | OUTPATIENT
Start: 2019-06-14 | End: 2019-06-14

## 2019-06-14 RX ORDER — METOCLOPRAMIDE HCL 10 MG
10 TABLET ORAL EVERY 8 HOURS
Refills: 0 | Status: DISCONTINUED | OUTPATIENT
Start: 2019-06-14 | End: 2019-06-19

## 2019-06-14 RX ORDER — METOCLOPRAMIDE HCL 10 MG
10 TABLET ORAL EVERY 8 HOURS
Refills: 0 | Status: DISCONTINUED | OUTPATIENT
Start: 2019-06-14 | End: 2019-06-14

## 2019-06-14 RX ADMIN — HYDROMORPHONE HYDROCHLORIDE 30 MILLILITER(S): 2 INJECTION INTRAMUSCULAR; INTRAVENOUS; SUBCUTANEOUS at 20:40

## 2019-06-14 RX ADMIN — Medication 10 MILLIGRAM(S): at 21:25

## 2019-06-14 RX ADMIN — Medication 50 MILLILITER(S): at 21:24

## 2019-06-14 RX ADMIN — Medication 400 MILLIGRAM(S): at 23:39

## 2019-06-14 RX ADMIN — ONDANSETRON 4 MILLIGRAM(S): 8 TABLET, FILM COATED ORAL at 23:44

## 2019-06-14 RX ADMIN — SODIUM CHLORIDE 50 MILLILITER(S): 9 INJECTION, SOLUTION INTRAVENOUS at 20:45

## 2019-06-14 RX ADMIN — Medication 500 MILLIGRAM(S): at 21:25

## 2019-06-14 RX ADMIN — HYDROMORPHONE HYDROCHLORIDE 1 MILLIGRAM(S): 2 INJECTION INTRAMUSCULAR; INTRAVENOUS; SUBCUTANEOUS at 17:45

## 2019-06-14 RX ADMIN — HYDROMORPHONE HYDROCHLORIDE 1 MILLIGRAM(S): 2 INJECTION INTRAMUSCULAR; INTRAVENOUS; SUBCUTANEOUS at 18:00

## 2019-06-14 RX ADMIN — HYDROMORPHONE HYDROCHLORIDE 30 MILLILITER(S): 2 INJECTION INTRAMUSCULAR; INTRAVENOUS; SUBCUTANEOUS at 18:18

## 2019-06-14 NOTE — PROGRESS NOTE ADULT - SUBJECTIVE AND OBJECTIVE BOX
The patient is scheduled for a pancreatico-jejunostomy today.  He has had no change in his health since last seen by me

## 2019-06-14 NOTE — CHART NOTE - NSCHARTNOTEFT_GEN_A_CORE
POST-OPERATIVE NOTE    SUBJECTIVE     Patient seen and examined at bedside. Patient is s/p pancreaticojejunostomy (6/14). He is recovering appropriately. The patient reports 8/10 pain in the are of the incision and upon movement from side to side but does not want anything else for pain. He states that he has been using his PCA pump hourly. Denies N/V. Does not report BMs or flatus at this time.     [facility]  used for all communication with patient in preferred language, Cantonese.        MEDICATIONS    MEDICATIONS  (STANDING):  albumin human 25% IVPB 200 milliLiter(s) IV Intermittent every 6 hours  enoxaparin Injectable 40 milliGRAM(s) SubCutaneous daily  erythromycin    ethylsuccinate Suspension 40 mG/mL 500 milliGRAM(s) Oral every 6 hours  HYDROmorphone PCA (1 mG/mL) 30 milliLiter(s) PCA Continuous PCA Continuous  lactated ringers. 1000 milliLiter(s) (50 mL/Hr) IV Continuous <Continuous>  metoclopramide Injectable 10 milliGRAM(s) IV Push every 8 hours    MEDICATIONS  (PRN):  naloxone Injectable 0.1 milliGRAM(s) IV Push every 3 minutes PRN For ANY of the following changes in patient status:  A. RR LESS THAN 10 breaths per minute, B. Oxygen saturation LESS THAN 90%, C. Sedation score of 6  ondansetron Injectable 4 milliGRAM(s) IV Push every 6 hours PRN Nausea and/or Vomiting      OBJECTIVE    Vital Signs Last 24 Hrs  T(C): 37.2 (14 Jun 2019 21:21), Max: 37.2 (14 Jun 2019 20:19)  T(F): 99 (14 Jun 2019 21:21), Max: 99 (14 Jun 2019 20:19)  HR: 74 (14 Jun 2019 21:21) (69 - 80)  BP: 113/70 (14 Jun 2019 21:21) (112/72 - 137/69)  BP(mean): 88 (14 Jun 2019 19:30) (88 - 95)  RR: 18 (14 Jun 2019 21:21) (14 - 18)  SpO2: 96% (14 Jun 2019 21:21) (96% - 100%)    PHYSICAL EXAM  GENERAL: NAD, laying down in bed  HEENT: NG tube in place, draining sanguinous fluid  PULM: extubated, nonlabored breathing, no SOB   ABD: soft, non-tender, non-distended, central incision opsite with some central blood but otherwise clean, KAITY drain in LUQ with clean dressings draining sanguinous fluid   RENAL: caballero catheter in place, yellow urine  MSK: extremities WWP      I&O's Detail    14 Jun 2019 07:01  -  14 Jun 2019 22:21  --------------------------------------------------------  IN:    lactated ringers.: 150 mL  Total IN: 150 mL    OUT:    Bulb: 20 mL    Indwelling Catheter - Urethral: 325 mL  Total OUT: 345 mL    Total NET: -195 mL        PAST MEDICAL & SURGICAL HISTORY:  Pancreatic duct dilated  Chronic pancreatitis, unspecified pancreatitis type  H/O endoscopy: 2018  Abnormal magnetic resonance cholangiopancreatography (MRCP): 1/2019  History of ERCP: 1/2019,        LABS                        13.4   14.6  )-----------( 302      ( 14 Jun 2019 17:59 )             40.1           RADIOLOGY/ADDITIONAL STUDIES    Assessment:  The patient is a 68y Male who is now several hours post-op from a pancreaticojejunostomy    Plan:  - Pain control as needed with PCA and IV tylenol   - DVT ppx with lovenox  - OOB and ambulating as tolerated  - F/u H+H q6 until AM  - Diet: NPO  - f/u small volume sanguinous NG tube output   - f/u KAITY drain output    Miladys Hua, MS4  [facility], [phone] POST-OPERATIVE NOTE    SUBJECTIVE     Patient seen and examined at bedside. Patient is s/p pancreaticojejunostomy (6/14). He is recovering appropriately. The patient reports 8/10 pain in the are of the incision and upon movement from side to side but does not want anything else for pain. He states that he has been using his PCA pump hourly. Denies N/V. Does not report BMs or flatus at this time.     [employer]  used for all communication with patient in preferred language, Cantonese.        MEDICATIONS    MEDICATIONS  (STANDING):  albumin human 25% IVPB 200 milliLiter(s) IV Intermittent every 6 hours  enoxaparin Injectable 40 milliGRAM(s) SubCutaneous daily  erythromycin    ethylsuccinate Suspension 40 mG/mL 500 milliGRAM(s) Oral every 6 hours  HYDROmorphone PCA (1 mG/mL) 30 milliLiter(s) PCA Continuous PCA Continuous  lactated ringers. 1000 milliLiter(s) (50 mL/Hr) IV Continuous <Continuous>  metoclopramide Injectable 10 milliGRAM(s) IV Push every 8 hours    MEDICATIONS  (PRN):  naloxone Injectable 0.1 milliGRAM(s) IV Push every 3 minutes PRN For ANY of the following changes in patient status:  A. RR LESS THAN 10 breaths per minute, B. Oxygen saturation LESS THAN 90%, C. Sedation score of 6  ondansetron Injectable 4 milliGRAM(s) IV Push every 6 hours PRN Nausea and/or Vomiting      OBJECTIVE    Vital Signs Last 24 Hrs  T(C): 37.2 (14 Jun 2019 21:21), Max: 37.2 (14 Jun 2019 20:19)  T(F): 99 (14 Jun 2019 21:21), Max: 99 (14 Jun 2019 20:19)  HR: 74 (14 Jun 2019 21:21) (69 - 80)  BP: 113/70 (14 Jun 2019 21:21) (112/72 - 137/69)  BP(mean): 88 (14 Jun 2019 19:30) (88 - 95)  RR: 18 (14 Jun 2019 21:21) (14 - 18)  SpO2: 96% (14 Jun 2019 21:21) (96% - 100%)    PHYSICAL EXAM  GENERAL: NAD, laying down in bed  HEENT: NG tube in place, draining sanguinous fluid  PULM: extubated, nonlabored breathing, no SOB   ABD: soft, non-tender, non-distended, central incision opsite with some central blood but otherwise clean, KAITY drain in LUQ with clean dressings draining sanguinous fluid   RENAL: caballero catheter in place, yellow urine  MSK: extremities WWP      I&O's Detail    14 Jun 2019 07:01  -  14 Jun 2019 22:21  --------------------------------------------------------  IN:    lactated ringers.: 150 mL  Total IN: 150 mL    OUT:    Bulb: 20 mL    Indwelling Catheter - Urethral: 325 mL  Total OUT: 345 mL    Total NET: -195 mL        PAST MEDICAL & SURGICAL HISTORY:  Pancreatic duct dilated  Chronic pancreatitis, unspecified pancreatitis type  H/O endoscopy: 2018  Abnormal magnetic resonance cholangiopancreatography (MRCP): 1/2019  History of ERCP: 1/2019,        LABS                        13.4   14.6  )-----------( 302      ( 14 Jun 2019 17:59 )             40.1           RADIOLOGY/ADDITIONAL STUDIES    Assessment:  The patient is a 68y Male who is now several hours post-op from a pancreaticojejunostomy    Plan:  - Pain control as needed with PCA and IV tylenol   - DVT ppx with lovenox  - OOB and ambulating as tolerated  - F/u H+H q6 until AM  - Diet: NPO  - f/u small volume sanguinous NG tube output   - f/u KAITY drain output.    Miladys Hua, MS4  [facility], [phone] POST-OPERATIVE NOTE    SUBJECTIVE     Patient seen and examined at bedside. Patient is s/p pancreaticojejunostomy (6/14). He is recovering appropriately. The patient reports 8/10 pain in the are of the incision and upon movement from side to side but does not want anything else for pain. He states that he has been using his PCA pump hourly. Denies N/V. Does not report BMs or flatus at this time.     [employer]  used for all communication with patient in preferred language, Cantonese.        MEDICATIONS    MEDICATIONS  (STANDING):  albumin human 25% IVPB 200 milliLiter(s) IV Intermittent every 6 hours  enoxaparin Injectable 40 milliGRAM(s) SubCutaneous daily  erythromycin    ethylsuccinate Suspension 40 mG/mL 500 milliGRAM(s) Oral every 6 hours  HYDROmorphone PCA (1 mG/mL) 30 milliLiter(s) PCA Continuous PCA Continuous  lactated ringers. 1000 milliLiter(s) (50 mL/Hr) IV Continuous <Continuous>  metoclopramide Injectable 10 milliGRAM(s) IV Push every 8 hours    MEDICATIONS  (PRN):  naloxone Injectable 0.1 milliGRAM(s) IV Push every 3 minutes PRN For ANY of the following changes in patient status:  A. RR LESS THAN 10 breaths per minute, B. Oxygen saturation LESS THAN 90%, C. Sedation score of 6  ondansetron Injectable 4 milliGRAM(s) IV Push every 6 hours PRN Nausea and/or Vomiting      OBJECTIVE    Vital Signs Last 24 Hrs  T(C): 37.2 (14 Jun 2019 21:21), Max: 37.2 (14 Jun 2019 20:19)  T(F): 99 (14 Jun 2019 21:21), Max: 99 (14 Jun 2019 20:19)  HR: 74 (14 Jun 2019 21:21) (69 - 80)  BP: 113/70 (14 Jun 2019 21:21) (112/72 - 137/69)  BP(mean): 88 (14 Jun 2019 19:30) (88 - 95)  RR: 18 (14 Jun 2019 21:21) (14 - 18)  SpO2: 96% (14 Jun 2019 21:21) (96% - 100%)    PHYSICAL EXAM  GENERAL: NAD, laying down in bed  HEENT: NG tube in place, draining sanguinous fluid  PULM: extubated, nonlabored breathing, no SOB   ABD: soft, non-tender, non-distended, central incision opsite with strikethrough, KAITY drain in LUQ with clean dressings draining sanguinous fluid   RENAL: caballero catheter in place, yellow urine  MSK: extremities WWP      I&O's Detail    14 Jun 2019 07:01  -  14 Jun 2019 22:21  --------------------------------------------------------  IN:    lactated ringers.: 150 mL  Total IN: 150 mL    OUT:    Bulb: 20 mL    Indwelling Catheter - Urethral: 325 mL  Total OUT: 345 mL    Total NET: -195 mL        PAST MEDICAL & SURGICAL HISTORY:  Pancreatic duct dilated  Chronic pancreatitis, unspecified pancreatitis type  H/O endoscopy: 2018  Abnormal magnetic resonance cholangiopancreatography (MRCP): 1/2019  History of ERCP: 1/2019,        LABS                        13.4   14.6  )-----------( 302      ( 14 Jun 2019 17:59 )             40.1           RADIOLOGY/ADDITIONAL STUDIES    Assessment:  The patient is a 68y Male who is now several hours post-op from a pancreaticojejunostomy    Plan:  - Pain control as needed with PCA and IV tylenol   - DVT ppx with lovenox  - OOB and ambulating as tolerated  - F/u H+H q6 until AM  - Diet: NPO  - f/u small volume sanguinous NG tube output   - f/u KAITY drain output.    Miladys Hua, MS4  [facility], [phone]

## 2019-06-14 NOTE — BRIEF OPERATIVE NOTE - OPERATION/FINDINGS
Procedure: Pancreaticojejunostomy    Findings: A upper midline incision was made. Pancreas was identified. Distal pancreatic duct was identified, and opened. Small bowel was then divided, distal to the ligament of Tretiz. Distal bowel was then brought through the mesocolon to the pancreas. A small bowel-small bowel anastomosis was performed. A pancreaticojejunostomy was then performed. A 10 Fr KAITY drain was left anterior to the pancreaticojejunostomy. Fascia was closed and skin was approximated.

## 2019-06-15 LAB
ANION GAP SERPL CALC-SCNC: 10 MMOL/L — SIGNIFICANT CHANGE UP (ref 5–17)
ANION GAP SERPL CALC-SCNC: 13 MMOL/L — SIGNIFICANT CHANGE UP (ref 5–17)
BUN SERPL-MCNC: 14 MG/DL — SIGNIFICANT CHANGE UP (ref 7–23)
BUN SERPL-MCNC: 14 MG/DL — SIGNIFICANT CHANGE UP (ref 7–23)
CALCIUM SERPL-MCNC: 8.6 MG/DL — SIGNIFICANT CHANGE UP (ref 8.4–10.5)
CALCIUM SERPL-MCNC: 8.8 MG/DL — SIGNIFICANT CHANGE UP (ref 8.4–10.5)
CHLORIDE SERPL-SCNC: 105 MMOL/L — SIGNIFICANT CHANGE UP (ref 96–108)
CHLORIDE SERPL-SCNC: 105 MMOL/L — SIGNIFICANT CHANGE UP (ref 96–108)
CO2 SERPL-SCNC: 24 MMOL/L — SIGNIFICANT CHANGE UP (ref 22–31)
CO2 SERPL-SCNC: 25 MMOL/L — SIGNIFICANT CHANGE UP (ref 22–31)
CREAT SERPL-MCNC: 1.02 MG/DL — SIGNIFICANT CHANGE UP (ref 0.5–1.3)
CREAT SERPL-MCNC: 1.08 MG/DL — SIGNIFICANT CHANGE UP (ref 0.5–1.3)
GLUCOSE SERPL-MCNC: 116 MG/DL — HIGH (ref 70–99)
GLUCOSE SERPL-MCNC: 119 MG/DL — HIGH (ref 70–99)
HCT VFR BLD CALC: 30.9 % — LOW (ref 39–50)
HCT VFR BLD CALC: 32.1 % — LOW (ref 39–50)
HCT VFR BLD CALC: 33.8 % — LOW (ref 39–50)
HCT VFR BLD CALC: 34 % — LOW (ref 39–50)
HGB BLD-MCNC: 10.2 G/DL — LOW (ref 13–17)
HGB BLD-MCNC: 10.4 G/DL — LOW (ref 13–17)
HGB BLD-MCNC: 10.9 G/DL — LOW (ref 13–17)
HGB BLD-MCNC: 11.4 G/DL — LOW (ref 13–17)
MAGNESIUM SERPL-MCNC: 2.6 MG/DL — SIGNIFICANT CHANGE UP (ref 1.6–2.6)
MCHC RBC-ENTMCNC: 28.9 PG — SIGNIFICANT CHANGE UP (ref 27–34)
MCHC RBC-ENTMCNC: 28.9 PG — SIGNIFICANT CHANGE UP (ref 27–34)
MCHC RBC-ENTMCNC: 30.3 PG — SIGNIFICANT CHANGE UP (ref 27–34)
MCHC RBC-ENTMCNC: 30.9 PG — SIGNIFICANT CHANGE UP (ref 27–34)
MCHC RBC-ENTMCNC: 31.7 GM/DL — LOW (ref 32–36)
MCHC RBC-ENTMCNC: 32.2 GM/DL — SIGNIFICANT CHANGE UP (ref 32–36)
MCHC RBC-ENTMCNC: 33.5 GM/DL — SIGNIFICANT CHANGE UP (ref 32–36)
MCHC RBC-ENTMCNC: 33.7 GM/DL — SIGNIFICANT CHANGE UP (ref 32–36)
MCV RBC AUTO: 89.7 FL — SIGNIFICANT CHANGE UP (ref 80–100)
MCV RBC AUTO: 90.5 FL — SIGNIFICANT CHANGE UP (ref 80–100)
MCV RBC AUTO: 91.2 FL — SIGNIFICANT CHANGE UP (ref 80–100)
MCV RBC AUTO: 91.5 FL — SIGNIFICANT CHANGE UP (ref 80–100)
PHOSPHATE SERPL-MCNC: 1.4 MG/DL — LOW (ref 2.5–4.5)
PLATELET # BLD AUTO: 201 K/UL — SIGNIFICANT CHANGE UP (ref 150–400)
PLATELET # BLD AUTO: 208 K/UL — SIGNIFICANT CHANGE UP (ref 150–400)
PLATELET # BLD AUTO: 225 K/UL — SIGNIFICANT CHANGE UP (ref 150–400)
PLATELET # BLD AUTO: 243 K/UL — SIGNIFICANT CHANGE UP (ref 150–400)
POTASSIUM SERPL-MCNC: 4 MMOL/L — SIGNIFICANT CHANGE UP (ref 3.5–5.3)
POTASSIUM SERPL-MCNC: 4.1 MMOL/L — SIGNIFICANT CHANGE UP (ref 3.5–5.3)
POTASSIUM SERPL-SCNC: 4 MMOL/L — SIGNIFICANT CHANGE UP (ref 3.5–5.3)
POTASSIUM SERPL-SCNC: 4.1 MMOL/L — SIGNIFICANT CHANGE UP (ref 3.5–5.3)
RBC # BLD: 3.37 M/UL — LOW (ref 4.2–5.8)
RBC # BLD: 3.52 M/UL — LOW (ref 4.2–5.8)
RBC # BLD: 3.75 M/UL — LOW (ref 4.2–5.8)
RBC # BLD: 3.77 M/UL — LOW (ref 4.2–5.8)
RBC # FLD: 12.5 % — SIGNIFICANT CHANGE UP (ref 10.3–14.5)
RBC # FLD: 12.6 % — SIGNIFICANT CHANGE UP (ref 10.3–14.5)
RBC # FLD: 12.6 % — SIGNIFICANT CHANGE UP (ref 10.3–14.5)
RBC # FLD: 13.5 % — SIGNIFICANT CHANGE UP (ref 10.3–14.5)
SODIUM SERPL-SCNC: 140 MMOL/L — SIGNIFICANT CHANGE UP (ref 135–145)
SODIUM SERPL-SCNC: 142 MMOL/L — SIGNIFICANT CHANGE UP (ref 135–145)
WBC # BLD: 10.6 K/UL — HIGH (ref 3.8–10.5)
WBC # BLD: 8.3 K/UL — SIGNIFICANT CHANGE UP (ref 3.8–10.5)
WBC # BLD: 9.3 K/UL — SIGNIFICANT CHANGE UP (ref 3.8–10.5)
WBC # BLD: 9.55 K/UL — SIGNIFICANT CHANGE UP (ref 3.8–10.5)
WBC # FLD AUTO: 10.6 K/UL — HIGH (ref 3.8–10.5)
WBC # FLD AUTO: 8.3 K/UL — SIGNIFICANT CHANGE UP (ref 3.8–10.5)
WBC # FLD AUTO: 9.3 K/UL — SIGNIFICANT CHANGE UP (ref 3.8–10.5)
WBC # FLD AUTO: 9.55 K/UL — SIGNIFICANT CHANGE UP (ref 3.8–10.5)

## 2019-06-15 RX ADMIN — Medication 50 MILLILITER(S): at 14:52

## 2019-06-15 RX ADMIN — Medication 10 MILLIGRAM(S): at 05:04

## 2019-06-15 RX ADMIN — Medication 1000 MILLIGRAM(S): at 00:09

## 2019-06-15 RX ADMIN — Medication 500 MILLIGRAM(S): at 09:03

## 2019-06-15 RX ADMIN — Medication 400 MILLIGRAM(S): at 17:32

## 2019-06-15 RX ADMIN — Medication 50 MILLILITER(S): at 03:14

## 2019-06-15 RX ADMIN — HYDROMORPHONE HYDROCHLORIDE 30 MILLILITER(S): 2 INJECTION INTRAMUSCULAR; INTRAVENOUS; SUBCUTANEOUS at 07:15

## 2019-06-15 RX ADMIN — ONDANSETRON 4 MILLIGRAM(S): 8 TABLET, FILM COATED ORAL at 11:13

## 2019-06-15 RX ADMIN — Medication 10 MILLIGRAM(S): at 22:25

## 2019-06-15 RX ADMIN — ONDANSETRON 4 MILLIGRAM(S): 8 TABLET, FILM COATED ORAL at 17:33

## 2019-06-15 RX ADMIN — Medication 500 MILLIGRAM(S): at 14:56

## 2019-06-15 RX ADMIN — Medication 1000 MILLIGRAM(S): at 05:35

## 2019-06-15 RX ADMIN — Medication 400 MILLIGRAM(S): at 11:18

## 2019-06-15 RX ADMIN — Medication 400 MILLIGRAM(S): at 05:05

## 2019-06-15 RX ADMIN — Medication 10 MILLIGRAM(S): at 13:26

## 2019-06-15 RX ADMIN — ONDANSETRON 4 MILLIGRAM(S): 8 TABLET, FILM COATED ORAL at 05:04

## 2019-06-15 RX ADMIN — HYDROMORPHONE HYDROCHLORIDE 30 MILLILITER(S): 2 INJECTION INTRAMUSCULAR; INTRAVENOUS; SUBCUTANEOUS at 19:08

## 2019-06-15 RX ADMIN — ENOXAPARIN SODIUM 40 MILLIGRAM(S): 100 INJECTION SUBCUTANEOUS at 11:12

## 2019-06-15 RX ADMIN — Medication 500 MILLIGRAM(S): at 22:25

## 2019-06-15 RX ADMIN — Medication 500 MILLIGRAM(S): at 04:19

## 2019-06-15 RX ADMIN — Medication 50 MILLILITER(S): at 22:25

## 2019-06-15 RX ADMIN — Medication 50 MILLILITER(S): at 09:02

## 2019-06-15 NOTE — PROGRESS NOTE ADULT - SUBJECTIVE AND OBJECTIVE BOX
Day _1_ of Anesthesia Pain Management Service    SUBJECTIVE: Patient is doing well with IV PCA    Pain Scale Score:	[X] Refer to charted pain scores    THERAPY:    [ ] IV PCA Morphine		[ ] 5 mg/mL	[ ] 1 mg/mL  [X] IV PCA Hydromorphone	[ ] 5 mg/mL	[X] 1 mg/mL  [ ] IV PCA Fentanyl		[ ] 50 micrograms/mL    Demand dose: 0.2 mg     Lockout: 6 minutes   Continuous Rate: 0 mg/hr  4 Hour Limit: 4 mg    MEDICATIONS  (STANDING):  acetaminophen  IVPB .. 1000 milliGRAM(s) IV Intermittent once  acetaminophen  IVPB .. 1000 milliGRAM(s) IV Intermittent once  albumin human 25% IVPB 200 milliLiter(s) IV Intermittent every 6 hours  enoxaparin Injectable 40 milliGRAM(s) SubCutaneous daily  erythromycin    ethylsuccinate Suspension 40 mG/mL 500 milliGRAM(s) Oral every 6 hours  HYDROmorphone PCA (1 mG/mL) 30 milliLiter(s) PCA Continuous PCA Continuous  lactated ringers. 1000 milliLiter(s) (50 mL/Hr) IV Continuous <Continuous>  metoclopramide Injectable 10 milliGRAM(s) IV Push every 8 hours  ondansetron Injectable 4 milliGRAM(s) IV Push every 6 hours    MEDICATIONS  (PRN):  naloxone Injectable 0.1 milliGRAM(s) IV Push every 3 minutes PRN For ANY of the following changes in patient status:  A. RR LESS THAN 10 breaths per minute, B. Oxygen saturation LESS THAN 90%, C. Sedation score of 6      OBJECTIVE:    Sedation Score:	[ X] Alert	[ ] Drowsy 	[ ] Arousable	[ ] Asleep	[ ] Unresponsive    Side Effects:	[X ] None	[ ] Nausea	[ ] Vomiting	[ ] Pruritus  		[ ] Other:    Vital Signs Last 24 Hrs  T(C): 37.4 (15 Cristian 2019 04:48), Max: 37.4 (15 Cristian 2019 04:48)  T(F): 99.3 (15 Cristian 2019 04:48), Max: 99.3 (15 Cristian 2019 04:48)  HR: 86 (15 Cristian 2019 04:48) (69 - 86)  BP: 112/68 (15 Cristian 2019 04:48) (102/63 - 137/69)  BP(mean): 88 (14 Jun 2019 19:30) (88 - 95)  RR: 18 (15 Cristian 2019 04:48) (14 - 18)  SpO2: 96% (15 Cristian 2019 04:48) (96% - 100%)    ASSESSMENT/ PLAN    Therapy to  be:               [X] Continued   [ ] Discontinued   [ ] Changed to PRN Analgesics    Documentation and Verification of current medications:   [X] Done	[ ] Not done, not eligible    Comments:

## 2019-06-15 NOTE — PROGRESS NOTE ADULT - SUBJECTIVE AND OBJECTIVE BOX
SURGERY DAILY PROGRESS NOTE:       SUBJECTIVE/ROS: Patient examined at bedside. No acute events overnight. Pain well controlled with PCA. NGT w/ moderate output. Appropriate UOP. No GI fxn.          MEDICATIONS  (STANDING):  acetaminophen  IVPB .. 1000 milliGRAM(s) IV Intermittent once  acetaminophen  IVPB .. 1000 milliGRAM(s) IV Intermittent once  albumin human 25% IVPB 200 milliLiter(s) IV Intermittent every 6 hours  enoxaparin Injectable 40 milliGRAM(s) SubCutaneous daily  erythromycin    ethylsuccinate Suspension 40 mG/mL 500 milliGRAM(s) Oral every 6 hours  HYDROmorphone PCA (1 mG/mL) 30 milliLiter(s) PCA Continuous PCA Continuous  lactated ringers. 1000 milliLiter(s) (50 mL/Hr) IV Continuous <Continuous>  metoclopramide Injectable 10 milliGRAM(s) IV Push every 8 hours  ondansetron Injectable 4 milliGRAM(s) IV Push every 6 hours    MEDICATIONS  (PRN):  naloxone Injectable 0.1 milliGRAM(s) IV Push every 3 minutes PRN For ANY of the following changes in patient status:  A. RR LESS THAN 10 breaths per minute, B. Oxygen saturation LESS THAN 90%, C. Sedation score of 6      OBJECTIVE:    Vital Signs Last 24 Hrs  T(C): 37.4 (15 Cristian 2019 04:48), Max: 37.4 (15 Cristian 2019 04:48)  T(F): 99.3 (15 Cristian 2019 04:48), Max: 99.3 (15 Cristian 2019 04:48)  HR: 86 (15 Cristian 2019 04:48) (69 - 86)  BP: 112/68 (15 Cristian 2019 04:48) (102/63 - 137/69)  BP(mean): 88 (14 Jun 2019 19:30) (88 - 95)  RR: 18 (15 Cristian 2019 04:48) (14 - 18)  SpO2: 96% (15 Cristian 2019 04:48) (96% - 100%)        I&O's Detail    14 Jun 2019 07:01  -  15 Cristian 2019 07:00  --------------------------------------------------------  IN:    lactated ringers.: 600 mL    Solution: 400 mL    Solution: 200 mL  Total IN: 1200 mL    OUT:    Bulb: 70 mL    Indwelling Catheter - Urethral: 2000 mL    Nasoenteral Tube: 450 mL  Total OUT: 2520 mL    Total NET: -1320 mL          Daily Height in cm: 167.64 (14 Jun 2019 20:19)    Daily     LABS:                        12.1   11.5  )-----------( 262      ( 14 Jun 2019 22:44 )             36.2     06-14    139  |  97  |  14  ----------------------------<  185<H>  4.1   |  22  |  0.86    Ca    6.7<L>      14 Jun 2019 22:44    TPro  8.2  /  Alb  5.9<H>  /  TBili  0.6  /  DBili  0.1  /  AST  14  /  ALT  13  /  AlkPhos  65  06-14                  PHYSICAL EXAM:  GENERAL: NAD  HEENT: NG tube in place  PULM: nonlabored breathing on RA  ABD: soft, appropriately distended and tender, midline incision w/ some blood tinge, KAITY drain in LUQ - SS  RENAL: caballero catheter in place  MSK: extremities WWP

## 2019-06-15 NOTE — PROGRESS NOTE ADULT - ASSESSMENT
68M s/p pancreaticojejunostomy on 6/14 for chronic pancreatitis and disconnect duct syndrome.     Plan:  - NPO w/ NGT/IVF  - D/c Esposito  - Pain control as needed with PCA/IV Tylenol   - F/u CBC  - Monitor KAITY output    - DVT ppx  - OOB    [facility], [id number]. 68M s/p pancreaticojejunostomy on 6/14 for chronic pancreatitis and disconnect duct syndrome.     Plan:  - NPO w/ NGT/IVF  - D/c Esposito if CBC stable   - Pain control as needed with PCA/IV Tylenol   - F/u CBC  - Monitor KAITY output    - DVT ppx  - OOB    [facility], [phone].

## 2019-06-16 LAB
APPEARANCE UR: CLEAR — SIGNIFICANT CHANGE UP
BILIRUB UR-MCNC: NEGATIVE — SIGNIFICANT CHANGE UP
BLD GP AB SCN SERPL QL: NEGATIVE — SIGNIFICANT CHANGE UP
COLOR SPEC: ABNORMAL
DIFF PNL FLD: ABNORMAL
GLUCOSE UR QL: NEGATIVE — SIGNIFICANT CHANGE UP
HCT VFR BLD CALC: 26.8 % — LOW (ref 39–50)
HCT VFR BLD CALC: 27.3 % — LOW (ref 39–50)
HCT VFR BLD CALC: 29.6 % — LOW (ref 39–50)
HCT VFR BLD CALC: 29.7 % — LOW (ref 39–50)
HGB BLD-MCNC: 10.3 G/DL — LOW (ref 13–17)
HGB BLD-MCNC: 9.2 G/DL — LOW (ref 13–17)
HGB BLD-MCNC: 9.2 G/DL — LOW (ref 13–17)
HGB BLD-MCNC: 9.6 G/DL — LOW (ref 13–17)
KETONES UR-MCNC: ABNORMAL
LEUKOCYTE ESTERASE UR-ACNC: ABNORMAL
MCHC RBC-ENTMCNC: 29.3 PG — SIGNIFICANT CHANGE UP (ref 27–34)
MCHC RBC-ENTMCNC: 30.8 PG — SIGNIFICANT CHANGE UP (ref 27–34)
MCHC RBC-ENTMCNC: 31.1 PG — SIGNIFICANT CHANGE UP (ref 27–34)
MCHC RBC-ENTMCNC: 31.3 PG — SIGNIFICANT CHANGE UP (ref 27–34)
MCHC RBC-ENTMCNC: 32.3 GM/DL — SIGNIFICANT CHANGE UP (ref 32–36)
MCHC RBC-ENTMCNC: 33.9 GM/DL — SIGNIFICANT CHANGE UP (ref 32–36)
MCHC RBC-ENTMCNC: 34.5 GM/DL — SIGNIFICANT CHANGE UP (ref 32–36)
MCHC RBC-ENTMCNC: 34.7 GM/DL — SIGNIFICANT CHANGE UP (ref 32–36)
MCV RBC AUTO: 89.8 FL — SIGNIFICANT CHANGE UP (ref 80–100)
MCV RBC AUTO: 90.7 FL — SIGNIFICANT CHANGE UP (ref 80–100)
MCV RBC AUTO: 90.8 FL — SIGNIFICANT CHANGE UP (ref 80–100)
MCV RBC AUTO: 90.9 FL — SIGNIFICANT CHANGE UP (ref 80–100)
NITRITE UR-MCNC: NEGATIVE — SIGNIFICANT CHANGE UP
PH UR: 6.5 — SIGNIFICANT CHANGE UP (ref 5–8)
PHOSPHATE SERPL-MCNC: 3 MG/DL — SIGNIFICANT CHANGE UP (ref 2.5–4.5)
PLATELET # BLD AUTO: 177 K/UL — SIGNIFICANT CHANGE UP (ref 150–400)
PLATELET # BLD AUTO: 179 K/UL — SIGNIFICANT CHANGE UP (ref 150–400)
PLATELET # BLD AUTO: 181 K/UL — SIGNIFICANT CHANGE UP (ref 150–400)
PLATELET # BLD AUTO: 213 K/UL — SIGNIFICANT CHANGE UP (ref 150–400)
PROT UR-MCNC: ABNORMAL
RBC # BLD: 2.96 M/UL — LOW (ref 4.2–5.8)
RBC # BLD: 3 M/UL — LOW (ref 4.2–5.8)
RBC # BLD: 3.27 M/UL — LOW (ref 4.2–5.8)
RBC # BLD: 3.3 M/UL — LOW (ref 4.2–5.8)
RBC # FLD: 12.3 % — SIGNIFICANT CHANGE UP (ref 10.3–14.5)
RBC # FLD: 12.4 % — SIGNIFICANT CHANGE UP (ref 10.3–14.5)
RH IG SCN BLD-IMP: POSITIVE — SIGNIFICANT CHANGE UP
SP GR SPEC: 1.03 — HIGH (ref 1.01–1.02)
UROBILINOGEN FLD QL: NEGATIVE — SIGNIFICANT CHANGE UP
WBC # BLD: 8.3 K/UL — SIGNIFICANT CHANGE UP (ref 3.8–10.5)
WBC # BLD: 8.5 K/UL — SIGNIFICANT CHANGE UP (ref 3.8–10.5)
WBC # BLD: 8.8 K/UL — SIGNIFICANT CHANGE UP (ref 3.8–10.5)
WBC # BLD: 9.9 K/UL — SIGNIFICANT CHANGE UP (ref 3.8–10.5)
WBC # FLD AUTO: 8.3 K/UL — SIGNIFICANT CHANGE UP (ref 3.8–10.5)
WBC # FLD AUTO: 8.5 K/UL — SIGNIFICANT CHANGE UP (ref 3.8–10.5)
WBC # FLD AUTO: 8.8 K/UL — SIGNIFICANT CHANGE UP (ref 3.8–10.5)
WBC # FLD AUTO: 9.9 K/UL — SIGNIFICANT CHANGE UP (ref 3.8–10.5)

## 2019-06-16 PROCEDURE — 71045 X-RAY EXAM CHEST 1 VIEW: CPT | Mod: 26

## 2019-06-16 RX ORDER — ACETAMINOPHEN 500 MG
1000 TABLET ORAL ONCE
Refills: 0 | Status: COMPLETED | OUTPATIENT
Start: 2019-06-16 | End: 2019-06-16

## 2019-06-16 RX ORDER — SODIUM CHLORIDE 9 MG/ML
1000 INJECTION, SOLUTION INTRAVENOUS
Refills: 0 | Status: DISCONTINUED | OUTPATIENT
Start: 2019-06-16 | End: 2019-06-17

## 2019-06-16 RX ADMIN — Medication 500 MILLIGRAM(S): at 10:21

## 2019-06-16 RX ADMIN — Medication 50 MILLILITER(S): at 10:20

## 2019-06-16 RX ADMIN — ONDANSETRON 4 MILLIGRAM(S): 8 TABLET, FILM COATED ORAL at 00:21

## 2019-06-16 RX ADMIN — SODIUM CHLORIDE 50 MILLILITER(S): 9 INJECTION, SOLUTION INTRAVENOUS at 10:21

## 2019-06-16 RX ADMIN — Medication 1000 MILLIGRAM(S): at 18:10

## 2019-06-16 RX ADMIN — Medication 500 MILLIGRAM(S): at 05:28

## 2019-06-16 RX ADMIN — Medication 10 MILLIGRAM(S): at 13:24

## 2019-06-16 RX ADMIN — Medication 10 MILLIGRAM(S): at 21:32

## 2019-06-16 RX ADMIN — Medication 50 MILLILITER(S): at 16:24

## 2019-06-16 RX ADMIN — HYDROMORPHONE HYDROCHLORIDE 30 MILLILITER(S): 2 INJECTION INTRAMUSCULAR; INTRAVENOUS; SUBCUTANEOUS at 19:09

## 2019-06-16 RX ADMIN — ONDANSETRON 4 MILLIGRAM(S): 8 TABLET, FILM COATED ORAL at 05:28

## 2019-06-16 RX ADMIN — HYDROMORPHONE HYDROCHLORIDE 30 MILLILITER(S): 2 INJECTION INTRAMUSCULAR; INTRAVENOUS; SUBCUTANEOUS at 07:22

## 2019-06-16 RX ADMIN — ONDANSETRON 4 MILLIGRAM(S): 8 TABLET, FILM COATED ORAL at 11:32

## 2019-06-16 RX ADMIN — ONDANSETRON 4 MILLIGRAM(S): 8 TABLET, FILM COATED ORAL at 16:25

## 2019-06-16 RX ADMIN — Medication 10 MILLIGRAM(S): at 05:25

## 2019-06-16 RX ADMIN — Medication 500 MILLIGRAM(S): at 16:25

## 2019-06-16 RX ADMIN — Medication 50 MILLILITER(S): at 21:33

## 2019-06-16 RX ADMIN — Medication 500 MILLIGRAM(S): at 21:32

## 2019-06-16 RX ADMIN — Medication 85 MILLIMOLE(S): at 00:21

## 2019-06-16 RX ADMIN — ENOXAPARIN SODIUM 40 MILLIGRAM(S): 100 INJECTION SUBCUTANEOUS at 11:32

## 2019-06-16 RX ADMIN — Medication 50 MILLILITER(S): at 05:27

## 2019-06-16 RX ADMIN — Medication 400 MILLIGRAM(S): at 18:10

## 2019-06-16 RX ADMIN — SODIUM CHLORIDE 50 MILLILITER(S): 9 INJECTION, SOLUTION INTRAVENOUS at 02:45

## 2019-06-16 NOTE — PROGRESS NOTE ADULT - SUBJECTIVE AND OBJECTIVE BOX
SURGERY DAILY PROGRESS NOTE:       SUBJECTIVE/ROS: Patient examined at bedside. No acute events overnight. H&H q6 has been stable. No Gi fxn. NGT w/ minimal output. Pain well controlled.          MEDICATIONS  (STANDING):  albumin human 25% IVPB 200 milliLiter(s) IV Intermittent every 6 hours  enoxaparin Injectable 40 milliGRAM(s) SubCutaneous daily  erythromycin    ethylsuccinate Suspension 40 mG/mL 500 milliGRAM(s) Oral every 6 hours  HYDROmorphone PCA (1 mG/mL) 30 milliLiter(s) PCA Continuous PCA Continuous  lactated ringers. 1000 milliLiter(s) (50 mL/Hr) IV Continuous <Continuous>  metoclopramide Injectable 10 milliGRAM(s) IV Push every 8 hours  ondansetron Injectable 4 milliGRAM(s) IV Push every 6 hours    MEDICATIONS  (PRN):  naloxone Injectable 0.1 milliGRAM(s) IV Push every 3 minutes PRN For ANY of the following changes in patient status:  A. RR LESS THAN 10 breaths per minute, B. Oxygen saturation LESS THAN 90%, C. Sedation score of 6      OBJECTIVE:    Vital Signs Last 24 Hrs  T(C): 37.4 (15 Cristian 2019 21:56), Max: 37.4 (15 Cristian 2019 04:48)  T(F): 99.3 (15 Cristian 2019 21:56), Max: 99.3 (15 Cristian 2019 04:48)  HR: 79 (15 Cristian 2019 21:56) (77 - 86)  BP: 107/63 (15 Cristian 2019 21:56) (99/65 - 112/68)  BP(mean): --  RR: 18 (15 Cristian 2019 21:56) (16 - 18)  SpO2: 95% (15 Cristian 2019 21:56) (95% - 96%)        I&O's Detail    14 Jun 2019 07:01  -  15 Cristian 2019 07:00  --------------------------------------------------------  IN:    lactated ringers.: 600 mL    Solution: 400 mL    Solution: 200 mL  Total IN: 1200 mL    OUT:    Bulb: 70 mL    Indwelling Catheter - Urethral: 2000 mL    Nasoenteral Tube: 450 mL  Total OUT: 2520 mL    Total NET: -1320 mL      15 Cristian 2019 07:01  -  16 Jun 2019 01:04  --------------------------------------------------------  IN:    Solution: 100 mL    Solution: 800 mL  Total IN: 900 mL    OUT:    Bulb: 95 mL    Indwelling Catheter - Urethral: 1425 mL    Nasoenteral Tube: 50 mL  Total OUT: 1570 mL    Total NET: -670 mL          Daily     Daily     LABS:                        10.4   9.3   )-----------( 208      ( 15 Cristian 2019 22:52 )             30.9     06-15    142  |  105  |  14  ----------------------------<  116<H>  4.1   |  24  |  1.02    Ca    8.8      15 Cristian 2019 22:52  Phos  1.4     06-15  Mg     2.6     06-15    TPro  8.2  /  Alb  5.9<H>  /  TBili  0.6  /  DBili  0.1  /  AST  14  /  ALT  13  /  AlkPhos  65  06-14                  PHYSICAL EXAM:  GEN: NAD  Pulm: unlabored breathing on RA  CV: radial pulse 2+, cap refil <2sec  Abd: soft, nontender, nondistedned, incision CDI

## 2019-06-16 NOTE — CHART NOTE - NSCHARTNOTEFT_GEN_A_CORE
Day __ of Anesthesia Pain Management Service    SUBJECTIVE: Patient is doing well with IV PCA    Pain Scale Score:	[X] Refer to charted pain scores    THERAPY:    [ ] IV PCA Morphine		[ ] 5 mg/mL	[ ] 1 mg/mL  [X] IV PCA Hydromorphone	[ ] 5 mg/mL	[X] 1 mg/mL  [ ] IV PCA Fentanyl		[ ] 50 micrograms/mL    Demand dose: 0.2 mg     Lockout: 6 minutes   Continuous Rate: 0 mg/hr  4 Hour Limit: 4 mg    MEDICATIONS  (STANDING):  albumin human 25% IVPB 200 milliLiter(s) IV Intermittent every 6 hours  dextrose 5% + sodium chloride 0.45%. 1000 milliLiter(s) (50 mL/Hr) IV Continuous <Continuous>  enoxaparin Injectable 40 milliGRAM(s) SubCutaneous daily  erythromycin    ethylsuccinate Suspension 40 mG/mL 500 milliGRAM(s) Oral every 6 hours  HYDROmorphone PCA (1 mG/mL) 30 milliLiter(s) PCA Continuous PCA Continuous  metoclopramide Injectable 10 milliGRAM(s) IV Push every 8 hours  ondansetron Injectable 4 milliGRAM(s) IV Push every 6 hours    MEDICATIONS  (PRN):  naloxone Injectable 0.1 milliGRAM(s) IV Push every 3 minutes PRN For ANY of the following changes in patient status:  A. RR LESS THAN 10 breaths per minute, B. Oxygen saturation LESS THAN 90%, C. Sedation score of 6      OBJECTIVE:    Sedation Score:	[ X] Alert	[ ] Drowsy 	[ ] Arousable	[ ] Asleep	[ ] Unresponsive    Side Effects:	[X ] None	[ ] Nausea	[ ] Vomiting	[ ] Pruritus  		[ ] Other:    Vital Signs Last 24 Hrs  T(C): 37.7 (16 Jun 2019 05:23), Max: 37.7 (16 Jun 2019 05:23)  T(F): 99.9 (16 Jun 2019 05:23), Max: 99.9 (16 Jun 2019 05:23)  HR: 84 (16 Jun 2019 05:23) (77 - 85)  BP: 99/61 (16 Jun 2019 05:23) (99/61 - 109/65)  BP(mean): --  RR: 18 (16 Jun 2019 05:23) (16 - 18)  SpO2: 95% (16 Jun 2019 05:23) (95% - 96%)    ASSESSMENT/ PLAN    Therapy to  be:               [X] Continued   [ ] Discontinued   [ ] Changed to PRN Analgesics    Documentation and Verification of current medications:   [X] Done	[ ] Not done, not eligible    Comments: Day 2 of Anesthesia Pain Management Service    SUBJECTIVE: Patient is doing well with IV PCA    Pain Scale Score:	[X] Refer to charted pain scores    THERAPY:    [ ] IV PCA Morphine		[ ] 5 mg/mL	[ ] 1 mg/mL  [X] IV PCA Hydromorphone	[ ] 5 mg/mL	[X] 1 mg/mL  [ ] IV PCA Fentanyl		[ ] 50 micrograms/mL    Demand dose: 0.2 mg     Lockout: 6 minutes   Continuous Rate: 0 mg/hr  4 Hour Limit: 4 mg    MEDICATIONS  (STANDING):  albumin human 25% IVPB 200 milliLiter(s) IV Intermittent every 6 hours  dextrose 5% + sodium chloride 0.45%. 1000 milliLiter(s) (50 mL/Hr) IV Continuous <Continuous>  enoxaparin Injectable 40 milliGRAM(s) SubCutaneous daily  erythromycin    ethylsuccinate Suspension 40 mG/mL 500 milliGRAM(s) Oral every 6 hours  HYDROmorphone PCA (1 mG/mL) 30 milliLiter(s) PCA Continuous PCA Continuous  metoclopramide Injectable 10 milliGRAM(s) IV Push every 8 hours  ondansetron Injectable 4 milliGRAM(s) IV Push every 6 hours    MEDICATIONS  (PRN):  naloxone Injectable 0.1 milliGRAM(s) IV Push every 3 minutes PRN For ANY of the following changes in patient status:  A. RR LESS THAN 10 breaths per minute, B. Oxygen saturation LESS THAN 90%, C. Sedation score of 6      OBJECTIVE:    Sedation Score:	[ X] Alert	[ ] Drowsy 	[ ] Arousable	[ ] Asleep	[ ] Unresponsive    Side Effects:	[X ] None	[ ] Nausea	[ ] Vomiting	[ ] Pruritus  		[ ] Other:    Vital Signs Last 24 Hrs  T(C): 37.7 (16 Jun 2019 05:23), Max: 37.7 (16 Jun 2019 05:23)  T(F): 99.9 (16 Jun 2019 05:23), Max: 99.9 (16 Jun 2019 05:23)  HR: 84 (16 Jun 2019 05:23) (77 - 85)  BP: 99/61 (16 Jun 2019 05:23) (99/61 - 109/65)  BP(mean): --  RR: 18 (16 Jun 2019 05:23) (16 - 18)  SpO2: 95% (16 Jun 2019 05:23) (95% - 96%)    ASSESSMENT/ PLAN    Therapy to  be:               [X] Continued   [ ] Discontinued   [ ] Changed to PRN Analgesics    Documentation and Verification of current medications:   [X] Done	[ ] Not done, not eligible

## 2019-06-16 NOTE — PROGRESS NOTE ADULT - ASSESSMENT
68M s/p pancreaticojejunostomy on 6/14 for chronic pancreatitis and disconnect duct syndrome.     Plan:  - NPO w/ NGT/IVF, possible NGT clamp trial   - D/c Esposito if CBC stable   - Pain control as needed with PCA/IV Tylenol   - F/u CBC  - Monitor KAITY output    - DVT ppx  - OOB    [facility], [id number].

## 2019-06-17 LAB
ANION GAP SERPL CALC-SCNC: 14 MMOL/L — SIGNIFICANT CHANGE UP (ref 5–17)
ANION GAP SERPL CALC-SCNC: 15 MMOL/L — SIGNIFICANT CHANGE UP (ref 5–17)
ANION GAP SERPL CALC-SCNC: 17 MMOL/L — SIGNIFICANT CHANGE UP (ref 5–17)
APTT BLD: 38.1 SEC — HIGH (ref 27.5–36.3)
BUN SERPL-MCNC: 11 MG/DL — SIGNIFICANT CHANGE UP (ref 7–23)
BUN SERPL-MCNC: 11 MG/DL — SIGNIFICANT CHANGE UP (ref 7–23)
BUN SERPL-MCNC: 12 MG/DL — SIGNIFICANT CHANGE UP (ref 7–23)
CALCIUM SERPL-MCNC: 9.1 MG/DL — SIGNIFICANT CHANGE UP (ref 8.4–10.5)
CALCIUM SERPL-MCNC: 9.3 MG/DL — SIGNIFICANT CHANGE UP (ref 8.4–10.5)
CALCIUM SERPL-MCNC: 9.5 MG/DL — SIGNIFICANT CHANGE UP (ref 8.4–10.5)
CHLORIDE SERPL-SCNC: 102 MMOL/L — SIGNIFICANT CHANGE UP (ref 96–108)
CHLORIDE SERPL-SCNC: 103 MMOL/L — SIGNIFICANT CHANGE UP (ref 96–108)
CHLORIDE SERPL-SCNC: 107 MMOL/L — SIGNIFICANT CHANGE UP (ref 96–108)
CO2 SERPL-SCNC: 19 MMOL/L — LOW (ref 22–31)
CO2 SERPL-SCNC: 23 MMOL/L — SIGNIFICANT CHANGE UP (ref 22–31)
CO2 SERPL-SCNC: 23 MMOL/L — SIGNIFICANT CHANGE UP (ref 22–31)
CREAT SERPL-MCNC: 0.93 MG/DL — SIGNIFICANT CHANGE UP (ref 0.5–1.3)
CREAT SERPL-MCNC: 0.98 MG/DL — SIGNIFICANT CHANGE UP (ref 0.5–1.3)
CREAT SERPL-MCNC: 1.01 MG/DL — SIGNIFICANT CHANGE UP (ref 0.5–1.3)
GLUCOSE SERPL-MCNC: 118 MG/DL — HIGH (ref 70–99)
GLUCOSE SERPL-MCNC: 135 MG/DL — HIGH (ref 70–99)
GLUCOSE SERPL-MCNC: 150 MG/DL — HIGH (ref 70–99)
HCT VFR BLD CALC: 31.5 % — LOW (ref 39–50)
HCT VFR BLD CALC: 31.6 % — LOW (ref 39–50)
HCT VFR BLD CALC: 33.3 % — LOW (ref 39–50)
HGB BLD-MCNC: 10.2 G/DL — LOW (ref 13–17)
HGB BLD-MCNC: 10.9 G/DL — LOW (ref 13–17)
HGB BLD-MCNC: 11.1 G/DL — LOW (ref 13–17)
INR BLD: 1.19 RATIO — HIGH (ref 0.88–1.16)
MAGNESIUM SERPL-MCNC: 2.2 MG/DL — SIGNIFICANT CHANGE UP (ref 1.6–2.6)
MAGNESIUM SERPL-MCNC: 2.4 MG/DL — SIGNIFICANT CHANGE UP (ref 1.6–2.6)
MAGNESIUM SERPL-MCNC: 2.5 MG/DL — SIGNIFICANT CHANGE UP (ref 1.6–2.6)
MCHC RBC-ENTMCNC: 29.3 PG — SIGNIFICANT CHANGE UP (ref 27–34)
MCHC RBC-ENTMCNC: 29.8 PG — SIGNIFICANT CHANGE UP (ref 27–34)
MCHC RBC-ENTMCNC: 30.8 PG — SIGNIFICANT CHANGE UP (ref 27–34)
MCHC RBC-ENTMCNC: 32.4 GM/DL — SIGNIFICANT CHANGE UP (ref 32–36)
MCHC RBC-ENTMCNC: 33.4 GM/DL — SIGNIFICANT CHANGE UP (ref 32–36)
MCHC RBC-ENTMCNC: 34.5 GM/DL — SIGNIFICANT CHANGE UP (ref 32–36)
MCV RBC AUTO: 89.2 FL — SIGNIFICANT CHANGE UP (ref 80–100)
MCV RBC AUTO: 89.2 FL — SIGNIFICANT CHANGE UP (ref 80–100)
MCV RBC AUTO: 90.4 FL — SIGNIFICANT CHANGE UP (ref 80–100)
PHOSPHATE SERPL-MCNC: 1.5 MG/DL — LOW (ref 2.5–4.5)
PHOSPHATE SERPL-MCNC: 2.7 MG/DL — SIGNIFICANT CHANGE UP (ref 2.5–4.5)
PHOSPHATE SERPL-MCNC: 3.3 MG/DL — SIGNIFICANT CHANGE UP (ref 2.5–4.5)
PLATELET # BLD AUTO: 172 K/UL — SIGNIFICANT CHANGE UP (ref 150–400)
PLATELET # BLD AUTO: 190 K/UL — SIGNIFICANT CHANGE UP (ref 150–400)
PLATELET # BLD AUTO: 193 K/UL — SIGNIFICANT CHANGE UP (ref 150–400)
POTASSIUM SERPL-MCNC: 3.5 MMOL/L — SIGNIFICANT CHANGE UP (ref 3.5–5.3)
POTASSIUM SERPL-MCNC: 3.8 MMOL/L — SIGNIFICANT CHANGE UP (ref 3.5–5.3)
POTASSIUM SERPL-MCNC: 4.4 MMOL/L — SIGNIFICANT CHANGE UP (ref 3.5–5.3)
POTASSIUM SERPL-SCNC: 3.5 MMOL/L — SIGNIFICANT CHANGE UP (ref 3.5–5.3)
POTASSIUM SERPL-SCNC: 3.8 MMOL/L — SIGNIFICANT CHANGE UP (ref 3.5–5.3)
POTASSIUM SERPL-SCNC: 4.4 MMOL/L — SIGNIFICANT CHANGE UP (ref 3.5–5.3)
PROTHROM AB SERPL-ACNC: 13.8 SEC — HIGH (ref 10–12.9)
RBC # BLD: 3.49 M/UL — LOW (ref 4.2–5.8)
RBC # BLD: 3.54 M/UL — LOW (ref 4.2–5.8)
RBC # BLD: 3.74 M/UL — LOW (ref 4.2–5.8)
RBC # FLD: 12.8 % — SIGNIFICANT CHANGE UP (ref 10.3–14.5)
RBC # FLD: 12.8 % — SIGNIFICANT CHANGE UP (ref 10.3–14.5)
RBC # FLD: 12.9 % — SIGNIFICANT CHANGE UP (ref 10.3–14.5)
SODIUM SERPL-SCNC: 139 MMOL/L — SIGNIFICANT CHANGE UP (ref 135–145)
SODIUM SERPL-SCNC: 141 MMOL/L — SIGNIFICANT CHANGE UP (ref 135–145)
SODIUM SERPL-SCNC: 143 MMOL/L — SIGNIFICANT CHANGE UP (ref 135–145)
WBC # BLD: 7.8 K/UL — SIGNIFICANT CHANGE UP (ref 3.8–10.5)
WBC # BLD: 8 K/UL — SIGNIFICANT CHANGE UP (ref 3.8–10.5)
WBC # BLD: 8.5 K/UL — SIGNIFICANT CHANGE UP (ref 3.8–10.5)
WBC # FLD AUTO: 7.8 K/UL — SIGNIFICANT CHANGE UP (ref 3.8–10.5)
WBC # FLD AUTO: 8 K/UL — SIGNIFICANT CHANGE UP (ref 3.8–10.5)
WBC # FLD AUTO: 8.5 K/UL — SIGNIFICANT CHANGE UP (ref 3.8–10.5)

## 2019-06-17 PROCEDURE — 71275 CT ANGIOGRAPHY CHEST: CPT | Mod: 26

## 2019-06-17 PROCEDURE — 74174 CTA ABD&PLVS W/CONTRAST: CPT | Mod: 26

## 2019-06-17 RX ORDER — POTASSIUM PHOSPHATE, MONOBASIC POTASSIUM PHOSPHATE, DIBASIC 236; 224 MG/ML; MG/ML
30 INJECTION, SOLUTION INTRAVENOUS ONCE
Refills: 0 | Status: COMPLETED | OUTPATIENT
Start: 2019-06-17 | End: 2019-06-17

## 2019-06-17 RX ORDER — PANTOPRAZOLE SODIUM 20 MG/1
40 TABLET, DELAYED RELEASE ORAL DAILY
Refills: 0 | Status: DISCONTINUED | OUTPATIENT
Start: 2019-06-17 | End: 2019-06-17

## 2019-06-17 RX ORDER — MEGESTROL ACETATE 40 MG/ML
400 SUSPENSION ORAL DAILY
Refills: 0 | Status: DISCONTINUED | OUTPATIENT
Start: 2019-06-17 | End: 2019-06-19

## 2019-06-17 RX ORDER — PANTOPRAZOLE SODIUM 20 MG/1
40 TABLET, DELAYED RELEASE ORAL
Refills: 0 | Status: DISCONTINUED | OUTPATIENT
Start: 2019-06-17 | End: 2019-06-19

## 2019-06-17 RX ORDER — DEXTROSE MONOHYDRATE, SODIUM CHLORIDE, AND POTASSIUM CHLORIDE 50; .745; 4.5 G/1000ML; G/1000ML; G/1000ML
1000 INJECTION, SOLUTION INTRAVENOUS
Refills: 0 | Status: DISCONTINUED | OUTPATIENT
Start: 2019-06-17 | End: 2019-06-19

## 2019-06-17 RX ORDER — POTASSIUM PHOSPHATE, MONOBASIC POTASSIUM PHOSPHATE, DIBASIC 236; 224 MG/ML; MG/ML
30 INJECTION, SOLUTION INTRAVENOUS ONCE
Refills: 0 | Status: DISCONTINUED | OUTPATIENT
Start: 2019-06-17 | End: 2019-06-17

## 2019-06-17 RX ORDER — MEGESTROL ACETATE 40 MG/ML
20 SUSPENSION ORAL DAILY
Refills: 0 | Status: DISCONTINUED | OUTPATIENT
Start: 2019-06-17 | End: 2019-06-17

## 2019-06-17 RX ORDER — ERYTHROMYCIN ETHYLSUCCINATE 400 MG
500 TABLET ORAL EVERY 6 HOURS
Refills: 0 | Status: DISCONTINUED | OUTPATIENT
Start: 2019-06-17 | End: 2019-06-19

## 2019-06-17 RX ORDER — POTASSIUM PHOSPHATE, MONOBASIC POTASSIUM PHOSPHATE, DIBASIC 236; 224 MG/ML; MG/ML
15 INJECTION, SOLUTION INTRAVENOUS ONCE
Refills: 0 | Status: DISCONTINUED | OUTPATIENT
Start: 2019-06-17 | End: 2019-06-17

## 2019-06-17 RX ADMIN — POTASSIUM PHOSPHATE, MONOBASIC POTASSIUM PHOSPHATE, DIBASIC 83.33 MILLIMOLE(S): 236; 224 INJECTION, SOLUTION INTRAVENOUS at 19:22

## 2019-06-17 RX ADMIN — DEXTROSE MONOHYDRATE, SODIUM CHLORIDE, AND POTASSIUM CHLORIDE 50 MILLILITER(S): 50; .745; 4.5 INJECTION, SOLUTION INTRAVENOUS at 22:23

## 2019-06-17 RX ADMIN — Medication 500 MILLIGRAM(S): at 10:09

## 2019-06-17 RX ADMIN — PANTOPRAZOLE SODIUM 40 MILLIGRAM(S): 20 TABLET, DELAYED RELEASE ORAL at 12:15

## 2019-06-17 RX ADMIN — Medication 10 MILLIGRAM(S): at 22:06

## 2019-06-17 RX ADMIN — Medication 50 MILLILITER(S): at 05:23

## 2019-06-17 RX ADMIN — HYDROMORPHONE HYDROCHLORIDE 30 MILLILITER(S): 2 INJECTION INTRAMUSCULAR; INTRAVENOUS; SUBCUTANEOUS at 07:09

## 2019-06-17 RX ADMIN — Medication 10 MILLIGRAM(S): at 05:23

## 2019-06-17 RX ADMIN — HYDROMORPHONE HYDROCHLORIDE 30 MILLILITER(S): 2 INJECTION INTRAMUSCULAR; INTRAVENOUS; SUBCUTANEOUS at 19:19

## 2019-06-17 RX ADMIN — ONDANSETRON 4 MILLIGRAM(S): 8 TABLET, FILM COATED ORAL at 17:19

## 2019-06-17 RX ADMIN — ONDANSETRON 4 MILLIGRAM(S): 8 TABLET, FILM COATED ORAL at 05:23

## 2019-06-17 RX ADMIN — Medication 500 MILLIGRAM(S): at 05:23

## 2019-06-17 RX ADMIN — Medication 10 MILLIGRAM(S): at 13:35

## 2019-06-17 RX ADMIN — ONDANSETRON 4 MILLIGRAM(S): 8 TABLET, FILM COATED ORAL at 12:20

## 2019-06-17 RX ADMIN — Medication 500 MILLIGRAM(S): at 23:15

## 2019-06-17 RX ADMIN — Medication 85 MILLIMOLE(S): at 01:01

## 2019-06-17 RX ADMIN — ENOXAPARIN SODIUM 40 MILLIGRAM(S): 100 INJECTION SUBCUTANEOUS at 12:15

## 2019-06-17 RX ADMIN — ONDANSETRON 4 MILLIGRAM(S): 8 TABLET, FILM COATED ORAL at 23:16

## 2019-06-17 RX ADMIN — ONDANSETRON 4 MILLIGRAM(S): 8 TABLET, FILM COATED ORAL at 01:02

## 2019-06-17 NOTE — PHYSICAL EXAM
[Normal] : normal external ears and hearing [de-identified] : normal lips  [de-identified] : normal respiratory effort  [de-identified] : not distended

## 2019-06-17 NOTE — PROGRESS NOTE ADULT - ASSESSMENT
68M s/p pancreaticojejunostomy on 6/14 for chronic pancreatitis and disconnect duct syndrome. On POD2 spiked fever and had BRBPR s/p pRBC x2    Plan:  - NPO w/ NGT/IVF, possible NGT clamp trial   - F/u H&H  - Pain control as needed with PCA/IV Tylenol   - Monitor KAITY output    - DVT ppx  - OOB    [facility], [id number]. 68M s/p pancreaticojejunostomy on 6/14 for chronic pancreatitis and disconnect duct syndrome. On POD2 spiked fever and had BRBPR s/p pRBC x2    Plan:  - NPO w/ NGT/IVF, possible NGT clamp trial   - F/u H&H  - Monitor GI fxn   - Pain control as needed with PCA/IV Tylenol   - Monitor KAITY output    - DVT ppx  - OOB    [facility], [id number].

## 2019-06-17 NOTE — REVIEW OF SYSTEMS
Pt discharged at this time. Discharged instruction and prescriptions reviewed and given to pt. Pt verbalized understanding. Pt aaox4. resp even and unlabored. In no acute distress. Left by wheelchair with hospital transport and family. Left with all belonging   [Negative] : Respiratory [FreeTextEntry2] : a [FreeTextEntry8] : early satiety, abdominal pain

## 2019-06-17 NOTE — PROGRESS NOTE ADULT - SUBJECTIVE AND OBJECTIVE BOX
SURGERY DAILY PROGRESS NOTE:       SUBJECTIVE/ROS: Patient examined at bedside. Spiked fever yesterday, UA grossly negative, CXR prelim negative. H&H remained stable however pt had BRBPR overnight. Not passing flatus. Decision was made to give pRBC given downtrending H&H and bloody movements. No tachycardia, SBP stable. Pain well controlled. OOB.     MEDICATIONS  (STANDING):  albumin human 25% IVPB 200 milliLiter(s) IV Intermittent every 6 hours  enoxaparin Injectable 40 milliGRAM(s) SubCutaneous daily  erythromycin    ethylsuccinate Suspension 40 mG/mL 500 milliGRAM(s) Oral every 6 hours  HYDROmorphone PCA (1 mG/mL) 30 milliLiter(s) PCA Continuous PCA Continuous  metoclopramide Injectable 10 milliGRAM(s) IV Push every 8 hours  ondansetron Injectable 4 milliGRAM(s) IV Push every 6 hours  pantoprazole  Injectable 40 milliGRAM(s) IV Push daily    MEDICATIONS  (PRN):  naloxone Injectable 0.1 milliGRAM(s) IV Push every 3 minutes PRN For ANY of the following changes in patient status:  A. RR LESS THAN 10 breaths per minute, B. Oxygen saturation LESS THAN 90%, C. Sedation score of 6      OBJECTIVE:    Vital Signs Last 24 Hrs  T(C): 37.3 (2019 04:44), Max: 38.7 (2019 18:20)  T(F): 99.2 (2019 04:44), Max: 101.6 (2019 18:20)  HR: 76 (2019 04:44) (76 - 89)  BP: 134/75 (2019 04:44) (105/62 - 134/75)  BP(mean): --  RR: 18 (2019 04:44) (18 - 18)  SpO2: 92% (2019 04:44) (92% - 94%)        I&O's Detail    15 Cristian 2019 07:01  -  2019 07:00  --------------------------------------------------------  IN:    dextrose 5% + sodium chloride 0.45%.: 250 mL    lactated ringers.: 200 mL    Solution: 100 mL    Solution: 1200 mL  Total IN: 1750 mL    OUT:    Bulb: 140 mL    Indwelling Catheter - Urethral: 1825 mL    Nasoenteral Tube: 200 mL  Total OUT: 2165 mL    Total NET: -415 mL      2019 07:01  -  2019 06:37  --------------------------------------------------------  IN:    dextrose 5% + sodium chloride 0.45%.: 600 mL    Solution: 400 mL  Total IN: 1000 mL    OUT:    Bulb: 60 mL    Indwelling Catheter - Urethral: 250 mL    Nasoenteral Tube: 100 mL    Voided: 300 mL  Total OUT: 710 mL    Total NET: 290 mL          Daily     Daily     LABS:                        10.3   9.9   )-----------( 179      ( 2019 23:40 )             29.6     06-16    143  |  103  |  11  ----------------------------<  135<H>  3.8   |  23  |  1.01    Ca    9.5      2019 23:40  Phos  1.5     -16  Mg     2.5     16      PT/INR - ( 2019 23:40 )   PT: 13.8 sec;   INR: 1.19 ratio         PTT - ( 2019 23:40 )  PTT:38.1 sec  Urinalysis Basic - ( 2019 18:56 )    Color: Light Orange / Appearance: Clear / S.029 / pH: x  Gluc: x / Ketone: Moderate  / Bili: Negative / Urobili: Negative   Blood: x / Protein: 300 mg/dL / Nitrite: Negative   Leuk Esterase: Small / RBC: 114 /hpf / WBC 29 /HPF   Sq Epi: x / Non Sq Epi: 1 /hpf / Bacteria: Negative              PHYSICAL EXAM:  GEN: NAD  Pulm: unlabored breathing on RA  CV: radial pulse 2+, cap refil <2sec  Abd: soft, nontender, nondistended,  incision CDI, KAITY w/ serous output

## 2019-06-17 NOTE — HISTORY OF PRESENT ILLNESS
[de-identified] : Patient known to me, s/p severe necrotizing pancreatitis.  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 He also has pancreatic ductal disruption.  Recent abdominal sono without gallstones. \par Patient reports abdominal pain attacks, especially with meals.

## 2019-06-17 NOTE — ASSESSMENT
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 No gallstones on recent sonogram. Pt with abdominal pain, early satiety. He is at risk for diabetes.  Risk / benefits of surgical intervention have been discussed. Patient son present during visit.  Patient has now opted for surgery. \par -PST arranged\par -Plan for pancreaticojejunostomy of body/tail pancreas- OR date 6/14/19

## 2019-06-17 NOTE — PROGRESS NOTE ADULT - SUBJECTIVE AND OBJECTIVE BOX
Day 3 of Anesthesia Pain Management Service    SUBJECTIVE: I'm doing ok    Pain Scale Score:	[X] Refer to charted pain scores    THERAPY:    [ ] IV PCA Morphine		[ ] 5 mg/mL	[ ] 1 mg/mL  [X] IV PCA Hydromorphone	[ ] 5 mg/mL	[X] 1 mg/mL  [ ] IV PCA Fentanyl		[ ] 50 micrograms/mL    Demand dose: 0.2 mg     Lockout: 6 minutes   Continuous Rate: 0 mg/hr  4 Hour Limit: 4 mg    MEDICATIONS  (STANDING):  dextrose 5% + sodium chloride 0.9% with potassium chloride 20 mEq/L 1000 milliLiter(s) (100 mL/Hr) IV Continuous <Continuous>  enoxaparin Injectable 40 milliGRAM(s) SubCutaneous daily  erythromycin    ethylsuccinate Suspension 40 mG/mL 500 milliGRAM(s) Oral every 6 hours  HYDROmorphone PCA (1 mG/mL) 30 milliLiter(s) PCA Continuous PCA Continuous  metoclopramide Injectable 10 milliGRAM(s) IV Push every 8 hours  ondansetron Injectable 4 milliGRAM(s) IV Push every 6 hours  pantoprazole  Injectable 40 milliGRAM(s) IV Push daily  potassium phosphate IVPB 15 milliMole(s) IV Intermittent once    MEDICATIONS  (PRN):  naloxone Injectable 0.1 milliGRAM(s) IV Push every 3 minutes PRN For ANY of the following changes in patient status:  A. RR LESS THAN 10 breaths per minute, B. Oxygen saturation LESS THAN 90%, C. Sedation score of 6      OBJECTIVE:    Sedation Score:	[ X] Alert 	[ ] Drowsy 	[ ] Arousable	[ ] Asleep	[ ] Unresponsive    Side Effects:	[X ] None	[ ] Nausea	[ ] Vomiting	[ ] Pruritus  		[ ] Other:    Vital Signs Last 24 Hrs  T(C): 37.3 (17 Jun 2019 04:44), Max: 38.7 (16 Jun 2019 18:20)  T(F): 99.2 (17 Jun 2019 04:44), Max: 101.6 (16 Jun 2019 18:20)  HR: 76 (17 Jun 2019 04:44) (76 - 89)  BP: 134/75 (17 Jun 2019 04:44) (105/62 - 134/75)  BP(mean): --  RR: 18 (17 Jun 2019 04:44) (18 - 18)  SpO2: 92% (17 Jun 2019 04:44) (92% - 94%)    ASSESSMENT/ PLAN    Therapy to  be:               [X] Continued   [ ] Discontinued   [ ] Changed to PRN Analgesics    Documentation and Verification of current medications:   [X] Done	[ ] Not done, not eligible    Comments: PCA use limited. +NGT

## 2019-06-18 LAB
ANION GAP SERPL CALC-SCNC: 13 MMOL/L — SIGNIFICANT CHANGE UP (ref 5–17)
ANION GAP SERPL CALC-SCNC: 15 MMOL/L — SIGNIFICANT CHANGE UP (ref 5–17)
BUN SERPL-MCNC: 12 MG/DL — SIGNIFICANT CHANGE UP (ref 7–23)
BUN SERPL-MCNC: 13 MG/DL — SIGNIFICANT CHANGE UP (ref 7–23)
CALCIUM SERPL-MCNC: 9.1 MG/DL — SIGNIFICANT CHANGE UP (ref 8.4–10.5)
CALCIUM SERPL-MCNC: 9.2 MG/DL — SIGNIFICANT CHANGE UP (ref 8.4–10.5)
CHLORIDE SERPL-SCNC: 105 MMOL/L — SIGNIFICANT CHANGE UP (ref 96–108)
CHLORIDE SERPL-SCNC: 109 MMOL/L — HIGH (ref 96–108)
CO2 SERPL-SCNC: 20 MMOL/L — LOW (ref 22–31)
CO2 SERPL-SCNC: 21 MMOL/L — LOW (ref 22–31)
CREAT SERPL-MCNC: 0.96 MG/DL — SIGNIFICANT CHANGE UP (ref 0.5–1.3)
CREAT SERPL-MCNC: 0.97 MG/DL — SIGNIFICANT CHANGE UP (ref 0.5–1.3)
GLUCOSE SERPL-MCNC: 114 MG/DL — HIGH (ref 70–99)
GLUCOSE SERPL-MCNC: 116 MG/DL — HIGH (ref 70–99)
HCT VFR BLD CALC: 31.3 % — LOW (ref 39–50)
HCT VFR BLD CALC: 31.8 % — LOW (ref 39–50)
HCT VFR BLD CALC: 32.3 % — LOW (ref 39–50)
HGB BLD-MCNC: 10.7 G/DL — LOW (ref 13–17)
HGB BLD-MCNC: 10.8 G/DL — LOW (ref 13–17)
HGB BLD-MCNC: 11 G/DL — LOW (ref 13–17)
MAGNESIUM SERPL-MCNC: 2.2 MG/DL — SIGNIFICANT CHANGE UP (ref 1.6–2.6)
MCHC RBC-ENTMCNC: 29.7 PG — SIGNIFICANT CHANGE UP (ref 27–34)
MCHC RBC-ENTMCNC: 30.9 PG — SIGNIFICANT CHANGE UP (ref 27–34)
MCHC RBC-ENTMCNC: 31.1 PG — SIGNIFICANT CHANGE UP (ref 27–34)
MCHC RBC-ENTMCNC: 33.1 GM/DL — SIGNIFICANT CHANGE UP (ref 32–36)
MCHC RBC-ENTMCNC: 34.6 GM/DL — SIGNIFICANT CHANGE UP (ref 32–36)
MCHC RBC-ENTMCNC: 34.6 GM/DL — SIGNIFICANT CHANGE UP (ref 32–36)
MCV RBC AUTO: 89.4 FL — SIGNIFICANT CHANGE UP (ref 80–100)
MCV RBC AUTO: 89.7 FL — SIGNIFICANT CHANGE UP (ref 80–100)
MCV RBC AUTO: 89.8 FL — SIGNIFICANT CHANGE UP (ref 80–100)
PHOSPHATE SERPL-MCNC: 1.8 MG/DL — LOW (ref 2.5–4.5)
PLATELET # BLD AUTO: 184 K/UL — SIGNIFICANT CHANGE UP (ref 150–400)
PLATELET # BLD AUTO: 212 K/UL — SIGNIFICANT CHANGE UP (ref 150–400)
PLATELET # BLD AUTO: 221 K/UL — SIGNIFICANT CHANGE UP (ref 150–400)
POTASSIUM SERPL-MCNC: 3.9 MMOL/L — SIGNIFICANT CHANGE UP (ref 3.5–5.3)
POTASSIUM SERPL-MCNC: 4 MMOL/L — SIGNIFICANT CHANGE UP (ref 3.5–5.3)
POTASSIUM SERPL-SCNC: 3.9 MMOL/L — SIGNIFICANT CHANGE UP (ref 3.5–5.3)
POTASSIUM SERPL-SCNC: 4 MMOL/L — SIGNIFICANT CHANGE UP (ref 3.5–5.3)
RBC # BLD: 3.5 M/UL — LOW (ref 4.2–5.8)
RBC # BLD: 3.55 M/UL — LOW (ref 4.2–5.8)
RBC # BLD: 3.6 M/UL — LOW (ref 4.2–5.8)
RBC # FLD: 12.9 % — SIGNIFICANT CHANGE UP (ref 10.3–14.5)
SODIUM SERPL-SCNC: 140 MMOL/L — SIGNIFICANT CHANGE UP (ref 135–145)
SODIUM SERPL-SCNC: 143 MMOL/L — SIGNIFICANT CHANGE UP (ref 135–145)
WBC # BLD: 7.3 K/UL — SIGNIFICANT CHANGE UP (ref 3.8–10.5)
WBC # BLD: 7.5 K/UL — SIGNIFICANT CHANGE UP (ref 3.8–10.5)
WBC # BLD: 8 K/UL — SIGNIFICANT CHANGE UP (ref 3.8–10.5)
WBC # FLD AUTO: 7.3 K/UL — SIGNIFICANT CHANGE UP (ref 3.8–10.5)
WBC # FLD AUTO: 7.5 K/UL — SIGNIFICANT CHANGE UP (ref 3.8–10.5)
WBC # FLD AUTO: 8 K/UL — SIGNIFICANT CHANGE UP (ref 3.8–10.5)

## 2019-06-18 RX ORDER — OXYCODONE HYDROCHLORIDE 5 MG/1
5 TABLET ORAL EVERY 4 HOURS
Refills: 0 | Status: DISCONTINUED | OUTPATIENT
Start: 2019-06-18 | End: 2019-06-19

## 2019-06-18 RX ORDER — SODIUM,POTASSIUM PHOSPHATES 278-250MG
2 POWDER IN PACKET (EA) ORAL ONCE
Refills: 0 | Status: COMPLETED | OUTPATIENT
Start: 2019-06-18 | End: 2019-06-19

## 2019-06-18 RX ORDER — ACETAMINOPHEN 500 MG
650 TABLET ORAL EVERY 6 HOURS
Refills: 0 | Status: DISCONTINUED | OUTPATIENT
Start: 2019-06-18 | End: 2019-06-19

## 2019-06-18 RX ADMIN — Medication 500 MILLIGRAM(S): at 23:41

## 2019-06-18 RX ADMIN — ONDANSETRON 4 MILLIGRAM(S): 8 TABLET, FILM COATED ORAL at 23:42

## 2019-06-18 RX ADMIN — ONDANSETRON 4 MILLIGRAM(S): 8 TABLET, FILM COATED ORAL at 05:18

## 2019-06-18 RX ADMIN — Medication 10 MILLIGRAM(S): at 05:18

## 2019-06-18 RX ADMIN — Medication 10 MILLIGRAM(S): at 22:16

## 2019-06-18 RX ADMIN — ONDANSETRON 4 MILLIGRAM(S): 8 TABLET, FILM COATED ORAL at 11:44

## 2019-06-18 RX ADMIN — PANTOPRAZOLE SODIUM 40 MILLIGRAM(S): 20 TABLET, DELAYED RELEASE ORAL at 05:18

## 2019-06-18 RX ADMIN — Medication 500 MILLIGRAM(S): at 18:11

## 2019-06-18 RX ADMIN — DEXTROSE MONOHYDRATE, SODIUM CHLORIDE, AND POTASSIUM CHLORIDE 50 MILLILITER(S): 50; .745; 4.5 INJECTION, SOLUTION INTRAVENOUS at 18:12

## 2019-06-18 RX ADMIN — Medication 500 MILLIGRAM(S): at 11:44

## 2019-06-18 RX ADMIN — Medication 500 MILLIGRAM(S): at 05:18

## 2019-06-18 RX ADMIN — Medication 10 MILLIGRAM(S): at 16:39

## 2019-06-18 RX ADMIN — PANTOPRAZOLE SODIUM 40 MILLIGRAM(S): 20 TABLET, DELAYED RELEASE ORAL at 18:12

## 2019-06-18 RX ADMIN — ONDANSETRON 4 MILLIGRAM(S): 8 TABLET, FILM COATED ORAL at 18:12

## 2019-06-18 RX ADMIN — MEGESTROL ACETATE 400 MILLIGRAM(S): 40 SUSPENSION ORAL at 11:43

## 2019-06-18 RX ADMIN — HYDROMORPHONE HYDROCHLORIDE 30 MILLILITER(S): 2 INJECTION INTRAMUSCULAR; INTRAVENOUS; SUBCUTANEOUS at 04:52

## 2019-06-18 NOTE — PROGRESS NOTE ADULT - SUBJECTIVE AND OBJECTIVE BOX
SURGERY DAILY PROGRESS NOTE:       SUBJECTIVE/ROS: Patient examined at bedside. Yesterday had many bloody BMs yesterday remained HDS, s/p PRB x2 w/ poor response. CTA abd/pelvis negative for active extravasation. Serial H&H mildly downtrending but stable. Had an episode of desaturation to 86% on RA, CTA chest negative for PE. Reports GI fxn w/ flatus. Tolerated CLD w/o N/V. Reports minimal paraincisional pain.          MEDICATIONS  (STANDING):  dextrose 5% + sodium chloride 0.9% with potassium chloride 20 mEq/L 1000 milliLiter(s) (50 mL/Hr) IV Continuous <Continuous>  erythromycin     base Tablet 500 milliGRAM(s) Oral every 6 hours  HYDROmorphone PCA (1 mG/mL) 30 milliLiter(s) PCA Continuous PCA Continuous  megestrol Suspension 400 milliGRAM(s) Oral daily  metoclopramide Injectable 10 milliGRAM(s) IV Push every 8 hours  ondansetron Injectable 4 milliGRAM(s) IV Push every 6 hours  pantoprazole  Injectable 40 milliGRAM(s) IV Push two times a day    MEDICATIONS  (PRN):  naloxone Injectable 0.1 milliGRAM(s) IV Push every 3 minutes PRN For ANY of the following changes in patient status:  A. RR LESS THAN 10 breaths per minute, B. Oxygen saturation LESS THAN 90%, C. Sedation score of 6      OBJECTIVE:    Vital Signs Last 24 Hrs  T(C): 36.8 (2019 04:53), Max: 37.4 (2019 16:18)  T(F): 98.2 (2019 04:53), Max: 99.4 (2019 16:18)  HR: 68 (2019 04:53) (68 - 77)  BP: 114/69 (2019 04:53) (113/72 - 151/81)  BP(mean): --  RR: 18 (2019 04:53) (18 - 19)  SpO2: 94% (2019 04:53) (88% - 94%)        I&O's Detail    2019 07:01  -  2019 07:00  --------------------------------------------------------  IN:    dextrose 5% + sodium chloride 0.9% with potassium chloride 20 mEq/L: 1400 mL    Oral Fluid: 560 mL    Packed Red Blood Cells: 350 mL    Solution: 500 mL  Total IN: 2810 mL    OUT:    Bulb: 140 mL  Total OUT: 140 mL    Total NET: 2670 mL          Daily     Daily     LABS:                        10.8   8.0   )-----------( 184      ( 2019 02:16 )             31.3     06-17    141  |  107  |  12  ----------------------------<  118<H>  4.4   |  19<L>  |  0.98    Ca    9.1      2019 22:28  Phos  3.3       Mg     2.2           PT/INR - ( 2019 23:40 )   PT: 13.8 sec;   INR: 1.19 ratio         PTT - ( 2019 23:40 )  PTT:38.1 sec  Urinalysis Basic - ( 2019 18:56 )    Color: Light Orange / Appearance: Clear / S.029 / pH: x  Gluc: x / Ketone: Moderate  / Bili: Negative / Urobili: Negative   Blood: x / Protein: 300 mg/dL / Nitrite: Negative   Leuk Esterase: Small / RBC: 114 /hpf / WBC 29 /HPF   Sq Epi: x / Non Sq Epi: 1 /hpf / Bacteria: Negative                PHYSICAL EXAM:  GEN: NAD  Pulm: unlabored breathing on RA  CV: radial pulse 2+, cap refil <2sec  Abd: soft, nontender, nondistedned, incision CDI

## 2019-06-18 NOTE — PROGRESS NOTE ADULT - SUBJECTIVE AND OBJECTIVE BOX
Day __ of Anesthesia Pain Management Service    SUBJECTIVE: Patient is doing well with IV PCA    Pain Scale Score:	[X] Refer to charted pain scores    THERAPY:    [ ] IV PCA Morphine		[ ] 5 mg/mL	[ ] 1 mg/mL  [X] IV PCA Hydromorphone	[ ] 5 mg/mL	[X] 1 mg/mL  [ ] IV PCA Fentanyl		[ ] 50 micrograms/mL    Demand dose: 0.2 mg     Lockout: 6 minutes   Continuous Rate: 0 mg/hr  4 Hour Limit: 4 mg    MEDICATIONS  (STANDING):  dextrose 5% + sodium chloride 0.9% with potassium chloride 20 mEq/L 1000 milliLiter(s) (50 mL/Hr) IV Continuous <Continuous>  erythromycin     base Tablet 500 milliGRAM(s) Oral every 6 hours  HYDROmorphone PCA (1 mG/mL) 30 milliLiter(s) PCA Continuous PCA Continuous  megestrol Suspension 400 milliGRAM(s) Oral daily  metoclopramide Injectable 10 milliGRAM(s) IV Push every 8 hours  ondansetron Injectable 4 milliGRAM(s) IV Push every 6 hours  pantoprazole  Injectable 40 milliGRAM(s) IV Push two times a day    MEDICATIONS  (PRN):  naloxone Injectable 0.1 milliGRAM(s) IV Push every 3 minutes PRN For ANY of the following changes in patient status:  A. RR LESS THAN 10 breaths per minute, B. Oxygen saturation LESS THAN 90%, C. Sedation score of 6      OBJECTIVE:    Sedation Score:	[ X] Alert	[ ] Drowsy 	[ ] Arousable	[ ] Asleep	[ ] Unresponsive    Side Effects:	[X ] None	[ ] Nausea	[ ] Vomiting	[ ] Pruritus  		[ ] Other:    Vital Signs Last 24 Hrs  T(C): 36.8 (18 Jun 2019 04:53), Max: 37.4 (17 Jun 2019 16:18)  T(F): 98.2 (18 Jun 2019 04:53), Max: 99.4 (17 Jun 2019 16:18)  HR: 68 (18 Jun 2019 04:53) (68 - 76)  BP: 114/69 (18 Jun 2019 04:53) (113/72 - 151/81)  BP(mean): --  RR: 18 (18 Jun 2019 04:53) (18 - 18)  SpO2: 94% (18 Jun 2019 04:53) (88% - 94%)    ASSESSMENT/ PLAN    Therapy to  be:               [X] Continued   [ ] Discontinued   [ ] Changed to PRN Analgesics    Documentation and Verification of current medications:   [X] Done	[ ] Not done, not eligible    Comments:

## 2019-06-18 NOTE — PROVIDER CONTACT NOTE (OTHER) - ACTION/TREATMENT ORDERED:
Md made aware and came to assess patient at bedside and observed stool. Cbc, bmp, coags sent STAT. Will continue to monitor.
Incentive spirometer encouraged by RN. BC x2 pending, UA pending, Chest xray pending. Continue to monitor.

## 2019-06-18 NOTE — PROGRESS NOTE ADULT - ASSESSMENT
68M s/p pancreaticojejunostomy on 6/14 for chronic pancreatitis and disconnect duct syndrome. On POD2 spiked fever and had BRBPR s/p pRBC x2. CTA abd/pelvis negative for active extravasation; CTA chest negative for PE.     Plan:  - C/w NPO/IVF might advance later if H&H is stable   - F/u serial H&H  - Monitor GI fxn   - D/c PCA  - Monitor KAITY output    - DVT ppx  - OOB    [facility], [id number]. 68M s/p pancreaticojejunostomy on 6/14 for chronic pancreatitis and disconnect duct syndrome. On POD2 spiked fever and had BRBPR s/p pRBC x2. CTA abd/pelvis negative for active extravasation; CTA chest negative for PE on POD3.     Plan:  - C/w NPO/IVF might advance later if H&H is stable   - F/u serial H&H  - Monitor GI fxn   - D/c PCA  - Monitor KAITY output    - DVT ppx  - OOB    [facility], [phone].

## 2019-06-19 ENCOUNTER — TRANSCRIPTION ENCOUNTER (OUTPATIENT)
Age: 69
End: 2019-06-19

## 2019-06-19 VITALS
HEART RATE: 66 BPM | RESPIRATION RATE: 18 BRPM | SYSTOLIC BLOOD PRESSURE: 137 MMHG | DIASTOLIC BLOOD PRESSURE: 81 MMHG | OXYGEN SATURATION: 96 % | TEMPERATURE: 98 F

## 2019-06-19 LAB — AMYLASE FLD-CCNC: >7300 U/L — SIGNIFICANT CHANGE UP

## 2019-06-19 RX ORDER — OXYCODONE HYDROCHLORIDE 5 MG/1
1 TABLET ORAL
Qty: 15 | Refills: 0
Start: 2019-06-19

## 2019-06-19 RX ORDER — ENOXAPARIN SODIUM 100 MG/ML
40 INJECTION SUBCUTANEOUS DAILY
Refills: 0 | Status: DISCONTINUED | OUTPATIENT
Start: 2019-06-19 | End: 2019-06-19

## 2019-06-19 RX ORDER — PANTOPRAZOLE SODIUM 20 MG/1
1 TABLET, DELAYED RELEASE ORAL
Qty: 30 | Refills: 0
Start: 2019-06-19 | End: 2019-07-18

## 2019-06-19 RX ORDER — METOCLOPRAMIDE HCL 10 MG
1 TABLET ORAL
Qty: 90 | Refills: 0
Start: 2019-06-19 | End: 2019-07-18

## 2019-06-19 RX ORDER — METOCLOPRAMIDE HCL 10 MG
10 TABLET ORAL EVERY 8 HOURS
Refills: 0 | Status: DISCONTINUED | OUTPATIENT
Start: 2019-06-19 | End: 2019-06-19

## 2019-06-19 RX ORDER — ONDANSETRON 8 MG/1
1 TABLET, FILM COATED ORAL
Qty: 90 | Refills: 0
Start: 2019-06-19 | End: 2019-07-18

## 2019-06-19 RX ORDER — ERYTHROMYCIN ETHYLSUCCINATE 400 MG
500 TABLET ORAL EVERY 8 HOURS
Refills: 0 | Status: DISCONTINUED | OUTPATIENT
Start: 2019-06-19 | End: 2019-06-19

## 2019-06-19 RX ORDER — ERYTHROMYCIN ETHYLSUCCINATE 400 MG
1 TABLET ORAL
Qty: 90 | Refills: 0
Start: 2019-06-19 | End: 2019-07-18

## 2019-06-19 RX ORDER — MEGESTROL ACETATE 40 MG/ML
10 SUSPENSION ORAL
Qty: 300 | Refills: 0
Start: 2019-06-19 | End: 2019-07-18

## 2019-06-19 RX ORDER — ACETAMINOPHEN 500 MG
2 TABLET ORAL
Qty: 0 | Refills: 0 | DISCHARGE
Start: 2019-06-19

## 2019-06-19 RX ORDER — PANTOPRAZOLE SODIUM 20 MG/1
40 TABLET, DELAYED RELEASE ORAL
Refills: 0 | Status: DISCONTINUED | OUTPATIENT
Start: 2019-06-19 | End: 2019-06-19

## 2019-06-19 RX ADMIN — Medication 500 MILLIGRAM(S): at 05:11

## 2019-06-19 RX ADMIN — MEGESTROL ACETATE 400 MILLIGRAM(S): 40 SUSPENSION ORAL at 11:33

## 2019-06-19 RX ADMIN — ONDANSETRON 4 MILLIGRAM(S): 8 TABLET, FILM COATED ORAL at 05:11

## 2019-06-19 RX ADMIN — PANTOPRAZOLE SODIUM 40 MILLIGRAM(S): 20 TABLET, DELAYED RELEASE ORAL at 05:10

## 2019-06-19 RX ADMIN — ONDANSETRON 4 MILLIGRAM(S): 8 TABLET, FILM COATED ORAL at 11:33

## 2019-06-19 RX ADMIN — Medication 500 MILLIGRAM(S): at 11:33

## 2019-06-19 RX ADMIN — Medication 10 MILLIGRAM(S): at 05:11

## 2019-06-19 RX ADMIN — ENOXAPARIN SODIUM 40 MILLIGRAM(S): 100 INJECTION SUBCUTANEOUS at 11:33

## 2019-06-19 RX ADMIN — Medication 2 PACKET(S): at 00:02

## 2019-06-19 NOTE — PATIENT PROFILE ADULT. - SOURCE OF INFORMATION, PROFILE
Surgical Progress Note:    POD #6 S/P Resection and closure of Colostomy  Afebrile. Neg N/V, no FLATUS/ + multiple BM, Distended, But pt states he feels better. Pain mod controlled, Denies chest pain or SOB.  Ambulating.  CRP trending up.  WBC's up.  AM XRAY with rt lower lobe infiltrate. Pt agreeing to NG tube placement this AM.   PE:  /85   Pulse 97   Temp 37 °C (98.6 °F) (Oral)   Resp 16   Ht 1.829 m (6')   Wt 82.2 kg (181 lb 3.5 oz)   SpO2 94%   BMI 24.58 kg/m²     I/O:   Intake/Output Summary (Last 24 hours) at 06/14/19 0715  Last data filed at 06/14/19 0400   Gross per 24 hour   Intake             1800 ml   Output              700 ml   Net             1100 ml         Review of Systems   Constitutional: Negative.  Negative for chills and fever.   Respiratory: Negative for shortness of breath.    Cardiovascular: Negative for chest pain.   Gastrointestinal: Negative for nausea and vomiting.        no flatus/+ multiple stool   Genitourinary: Negative.      Physical Exam   Constitutional:  appears well-developed.   Neck: Neck supple.   Cardiovascular: Normal rate.    Pulmonary/Chest: Effort normal.   Abdominal: Soft.  exhibits no distension. Appropriate tenderness.   Incisions clean, dry, and intact  Penrose drain in place  Musculoskeletal: Normal range of motion.   Neurological:  alert.   Skin:  Warm and dry.   Extremities: najera, no edema    Labs:  Recent Labs      06/17/19   0307  06/18/19   0518  06/19/19   0333   WBC  9.7  20.2*  22.6*   RBC  2.62*  3.40*  3.05*   HEMOGLOBIN  7.3*  9.2*  8.3*   HEMATOCRIT  23.0*  29.3*  26.7*   MCV  87.8  86.2  87.5   MCH  27.9  27.1  27.2   RDW  48.8  47.9  49.6   PLATELETCT  216  342  368   MPV  10.5  9.9  9.8   NEUTSPOLYS  78.80*  88.10*  85.00*   LYMPHOCYTES  12.00*  5.10*  6.30*   MONOCYTES  6.60  6.00  7.80   EOSINOPHILS  1.90  0.00  0.00   BASOPHILS  0.10  0.20  0.20     Recent Labs      06/17/19   0307  06/18/19   0518  06/19/19   0333   SODIUM  137  139   139   POTASSIUM  4.3  4.3  4.4   CHLORIDE  106  104  107   CO2  24  22  22   GLUCOSE  97  110*  119*   BUN  18  14  13         A/P:     - NPO, sips with meds.  Awaiting return of GI function  - NG tube placment, decompress  - IS Q One hour while awake  - Ambulate.  Out of bed for all meals please  - Pain controlled.  Cont PO pain medication  - Labs noted, recheck in am  - chest xray, rt lower lobe infiltrate  Start cipro  - serial CRP, trending up  - DVT propholaxis, sequentials and ambulate  - Adequate urine output.  Cont, to monitor  - await AM xray,     Colostomy in place (HCC)- (present on admission)   Assessment & Plan    6/13/19  Resection and closure of colostomy  6/19 having BM's     Diverticulitis of colon with perforation- (present on admission)   Assessment & Plan    6/19 rt lower lobe infiltrate  Start IV antibiotics         Discussed with Patient, RN and Dr. Dony Lee, A.P.N.  [facility]  [phone]   patient/family

## 2019-06-19 NOTE — DISCHARGE NOTE NURSING/CASE MANAGEMENT/SOCIAL WORK - NSDCPNDISPN_GEN_ALL_CORE
Side effects of pain management treatment/Safe use, storage and disposal of opioids when prescribed/Activities of daily living, including home environment that might     exacerbate pain or reduce effectiveness of the pain management plan of care as well as strategies to address these issues/Opioids not applicable/not prescribed/Education provided on the pain management plan of care

## 2019-06-19 NOTE — DISCHARGE NOTE PROVIDER - CARE PROVIDER_API CALL
Malik Hinojosa)  Surgery  [address] [place] [address]  Phone: [phone]  Fax: [phone]  Follow Up Time: 1 week

## 2019-06-19 NOTE — DISCHARGE NOTE PROVIDER - HOSPITAL COURSE
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 Presents to [facility] for scheduled  Pancreaticojejunostomy of body/Tail of Pancreas on 6/14/19.  Pt denies any nausea , vomiting, abdominal pain, fever or chills.    On 6/14/19 pt underwent pancreaticojejunostomy.  He tolerated the procedure well, was extubated and sent to PACU in stable condition. The patient was hemodynamically stable and was transferred to a surgical floor.  Postoperatively while on floor, he was noted to have acute blood loss anemia, received packed red blood cells.  CT angio showed no acute bleed.  His H/H stablized and did not require additional transfusions.  His pain was controlled by IV pain medications and then by PO pain medications. When he had return of GI function, NGT was removed, he was started on clears, which he tolerated.  He was then advanced from clears to regular diet and tolerated it well.  KAITY amylase was >7300, he is discharged home with KAITY drain and he received KAITY teaching during hospitalization.  He is ambulating, voiding, tolerating a regular diet, and pain is controlled with oral pain medications.  Patient is stable for discharge home with KAITY drain and will follow-up with Dr. Hinojosa in 1 week. 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 Presents to [facility] for scheduled  Pancreaticojejunostomy of body/Tail of Pancreas on 6/14/19.  Pt denies any nausea , vomiting, abdominal pain, fever or chills.    On 6/14/19 pt underwent pancreaticojejunostomy.  He tolerated the procedure well, was extubated and sent to PACU in stable condition. The patient was hemodynamically stable and was transferred to a surgical floor.  Postoperatively while on floor, he was noted to have acute blood loss anemia, received packed red blood cells.  CT angio showed no acute bleed.  His H/H stablized and did not require additional transfusions.  His pain was controlled by IV pain medications and then by PO pain medications. When he had return of GI function, NGT was removed, he was started on clears, which he tolerated.  He was then advanced from clears to regular diet and tolerated it well.  KAITY amylase was >7300, he is discharged home with KAITY drain and he received KAITY teaching during hospitalization.  He is ambulating, voiding, tolerating a regular diet, and pain is controlled with oral pain medications.  Patient is stable for discharge home with KAITY drain and will follow-up with Dr. Hinojosa in 1 week.            Note: Patient has recovered well.    I will follow in my outpatient clinic

## 2019-06-19 NOTE — DISCHARGE NOTE PROVIDER - NSDCFUADDINST_GEN_ALL_CORE_FT
You will be discharged with KAITY drains. You will need to empty them and record outputs accurately. This will be taught to you by the nursing staff. Please do not remove the KAITY drains. They will be removed in the office. Please bring to the office accurate records of output.

## 2019-06-19 NOTE — DISCHARGE NOTE PROVIDER - NSDCCPCAREPLAN_GEN_ALL_CORE_FT
PRINCIPAL DISCHARGE DIAGNOSIS  Diagnosis: History of pancreatitis  Assessment and Plan of Treatment: WOUND CARE: Keep incisions clean and dry.  Follow-up in office for staple removal.  BATHING: Please do not submerge wound underwater. You may shower and/or sponge bathe.  ACTIVITY: No heavy lifting or straining. Otherwise, you may return to your usual level of physical activity. If you are taking narcotic pain medication (such as Percocet), do NOT drive a car, operate machinery or make important decisions.  DIET: Return to your usual diet.  NOTIFY YOUR SURGEON IF: You have any bleeding that does not stop, any pus draining from your wound, any fever (over 100.4 F) or chills, persistent nausea/vomiting, persistent diarrhea, or if your pain is not controlled on your discharge pain medications.  FOLLOW-UP:  1. Follow-up with Dr. Hinojosa within 1 week of discharge.  Please call office for appointment  2. Please follow up with your primary care physician in one week regarding your hospitalization.

## 2019-06-19 NOTE — DISCHARGE NOTE NURSING/CASE MANAGEMENT/SOCIAL WORK - NSDCDPATPORTLINK_GEN_ALL_CORE
You can access the [facility] Patient Portal, offered by [facility], by registering with the following website: [email or web address]

## 2019-06-21 PROBLEM — K86.89 OTHER SPECIFIED DISEASES OF PANCREAS: Chronic | Status: ACTIVE | Noted: 2019-06-11

## 2019-06-21 LAB
CULTURE RESULTS: SIGNIFICANT CHANGE UP
CULTURE RESULTS: SIGNIFICANT CHANGE UP
SPECIMEN SOURCE: SIGNIFICANT CHANGE UP
SPECIMEN SOURCE: SIGNIFICANT CHANGE UP

## 2019-06-25 ENCOUNTER — APPOINTMENT (OUTPATIENT)
Age: 69
End: 2019-06-25

## 2019-06-25 VITALS
BODY MASS INDEX: 22.02 KG/M2 | TEMPERATURE: 97.7 F | HEART RATE: 72 BPM | OXYGEN SATURATION: 98 % | RESPIRATION RATE: 17 BRPM | SYSTOLIC BLOOD PRESSURE: 111 MMHG | WEIGHT: 137 LBS | DIASTOLIC BLOOD PRESSURE: 72 MMHG | HEIGHT: 66 IN

## 2019-06-26 NOTE — PATIENT PROFILE ADULT. - FUNCTIONAL SCREEN CURRENT LEVEL: SWALLOWING (IF SCORE 2 OR MORE FOR ANY ITEM, CONSULT REHAB SERVICES), MLM)
No anesthetic contraindications for PACU discharge.  Requires 2L O2 via nasal cannula and continuous pulse oximetry.
(0) swallows foods/liquids without difficulty

## 2019-06-28 PROCEDURE — 80048 BASIC METABOLIC PNL TOTAL CA: CPT

## 2019-06-28 PROCEDURE — 80076 HEPATIC FUNCTION PANEL: CPT

## 2019-06-28 PROCEDURE — 84100 ASSAY OF PHOSPHORUS: CPT

## 2019-06-28 PROCEDURE — 85610 PROTHROMBIN TIME: CPT

## 2019-06-28 PROCEDURE — 81001 URINALYSIS AUTO W/SCOPE: CPT

## 2019-06-28 PROCEDURE — 36430 TRANSFUSION BLD/BLD COMPNT: CPT

## 2019-06-28 PROCEDURE — 86901 BLOOD TYPING SEROLOGIC RH(D): CPT

## 2019-06-28 PROCEDURE — 71275 CT ANGIOGRAPHY CHEST: CPT

## 2019-06-28 PROCEDURE — 86850 RBC ANTIBODY SCREEN: CPT

## 2019-06-28 PROCEDURE — 87040 BLOOD CULTURE FOR BACTERIA: CPT

## 2019-06-28 PROCEDURE — 71045 X-RAY EXAM CHEST 1 VIEW: CPT

## 2019-06-28 PROCEDURE — C1889: CPT

## 2019-06-28 PROCEDURE — 86900 BLOOD TYPING SEROLOGIC ABO: CPT

## 2019-06-28 PROCEDURE — 83735 ASSAY OF MAGNESIUM: CPT

## 2019-06-28 PROCEDURE — 82150 ASSAY OF AMYLASE: CPT

## 2019-06-28 PROCEDURE — P9016: CPT

## 2019-06-28 PROCEDURE — 85027 COMPLETE CBC AUTOMATED: CPT

## 2019-06-28 PROCEDURE — 85730 THROMBOPLASTIN TIME PARTIAL: CPT

## 2019-06-28 PROCEDURE — P9047: CPT

## 2019-06-28 PROCEDURE — 74174 CTA ABD&PLVS W/CONTRAST: CPT

## 2019-06-28 PROCEDURE — 86923 COMPATIBILITY TEST ELECTRIC: CPT

## 2019-07-01 NOTE — PROGRESS NOTE ADULT - ATTENDING COMMENTS
Hospitalist Progress Note NAME: Yonny Cullen Sr.  
:  1933 MRN:  [id number] Assessment / Plan: 
Acute on to of Chronic CVA asa, statin neuro seen patient ordered multiple work up , recommended cardiology consult, EEG, CTA posterior circulation disease could explain the ataxia,  
 
Multiple emboli evidenced on MRI cardiology to evaluate for possible FLYNN ,  
 
Headache controlled much better ,  
 
DM 2 continue insulin regimen , Suspected concussion continue current regimen , Intractable N/V improved Hyponatremia improved Hyponatremia continue current regimen Chronic cough continue ,  
 
Unknown type hyperlipidemia statin Diabetic neuropathy continue current regimen ,  
 
 
Body mass index is 29.43 kg/m². Code status: DNR Prophylaxis: lovenox Recommended Disposition: possible home Subjective: Chief Complaint / Reason for Physician Visit Discussed with RN events overnight. No events overnight no fever no chills no nausea, no vomiting , Review of Systems: 
Symptom Y/N Comments  Symptom Y/N Comments Fever/Chills n   Chest Pain n   
Poor Appetite n   Edema n   
Cough n   Abdominal Pain n   
Sputum n   Joint Pain SOB/THAKKAR n   Pruritis/Rash Nausea/vomit n   Tolerating PT/OT Diarrhea n   Tolerating Diet Constipation    Other Could NOT obtain due to:   
 
Objective: VITALS:  
Last 24hrs VS reviewed since prior progress note. Most recent are: 
Patient Vitals for the past 24 hrs: 
 Temp Pulse Resp BP SpO2  
19 1125 97.9 °F (36.6 °C) 92 20 147/54 93 % 19 1102    116/53   
19 0946    156/53   
19 0938    115/83   
19 0937    149/50   
19 0935    163/71   
19 0716 97.8 °F (36.6 °C) 85 16 154/43 94 % 19 0429 97.6 °F (36.4 °C) 87 18 116/53 92 % 19 2318 98.2 °F (36.8 °C) 90 18 146/56 91 % 19 1928 97.7 °F (36.5 °C) 98 18 141/76 92 %
06/30/19 1738 97.7 °F (36.5 °C) 98 18 167/69 93 % 06/30/19 1615    146/59 95 % 06/30/19 1445    164/68 96 % Intake/Output Summary (Last 24 hours) at 7/1/2019 1400 Last data filed at 7/1/2019 9411 Gross per 24 hour Intake 1100 ml Output 1175 ml Net -75 ml PHYSICAL EXAM: 
General: WD, WN. Alert, cooperative, no acute distress   
EENT:  EOMI. Anicteric sclerae. MMM Resp:  CTA bilaterally, no wheezing or rales. No accessory muscle use CV:  Regular  rhythm,  No edema GI:  Soft, Non distended, Non tender.  +Bowel sounds Neurologic:  Alert and oriented X 3, normal speech, Psych:   Good insight. Not anxious nor agitated Skin:  No rashes. No jaundice Reviewed most current lab test results and cultures  YES Reviewed most current radiology test results   YES Review and summation of old records today    NO Reviewed patient's current orders and MAR    YES 
PMH/ reviewed - no change compared to H&P 
________________________________________________________________________ Care Plan discussed with: 
  Comments Patient x Family  x   
RN x Care Manager Consultant Multidiciplinary team rounds were held today with , nursing, pharmacist and clinical coordinator. Patient's plan of care was discussed; medications were reviewed and discharge planning was addressed. ________________________________________________________________________ Total CRITICAL CARE TIME Spent:   Minutes non procedure based Comments >50% of visit spent in counseling and coordination of care    
________________________________________________________________________ Ryan Alvarez MD  
 
Procedures: see electronic medical records for all procedures/Xrays and details which were not copied into this note but were reviewed prior to creation of Plan. LABS: 
I reviewed today's most current labs and imaging studies. Pertinent labs include: 
Recent Labs
07/01/19 
0255 06/30/19 
1236 WBC 6.8 8.7 HGB 13.0 13.8 HCT 39.5 40.0  268 Recent Labs  
  07/01/19 
0255 06/30/19 
1236  135*  
K 5.0 5.3*  
 100 CO2 30 28 * 252* BUN 18 16 CREA 1.36* 1.33* CA 8.8 9.4 MG 1.6  --   
PHOS 3.9  --   
 
 
Signed: Duke Fajardo MD 
 
 
 

As above.    Pt resting comfortably, abd pain well controlled.  Nontoxic appearing.    Approximately 6 cm pancreatic tail collection on cross sectional imaging.    Multidisciplinary discussion regarding EUS-guided cystgastrostomy and/or ERCP with pancreatic stent ongoing between Advanced [facility] team and Pancreatic surgeon (Dr. Hinojosa).  No immediate plans for endoscopic procedures.
MRCP with tail collection of 6 cm, no sign of necrosis  Otherwise, no sign of carcinomatosis on MRI and otherwise normal.
Imaging reviewed personally  The scans show an area of walled off necrosis with significant solid component  Fever concerning for infected necrosis  Cont Abx. Once cleared form TB perspective, will plan for drainage
As above.    Asymptomatic 6 cm walled off collection (mostly fluid, some solid material consistent with pancreatic necrosis) on MRI.  Review of imaging reports dating back to early August 2018 demonstrates developing peripancreatic fluid collections (dominant around the pancreas body, smaller collection around pancreas tail).  Fever resolved with antibiotics and have not recurred off antibiotics.    Discussed with my partner Dr. Harmon.  Message left for Dr. Hinojosa.  No urgent need for endoscopic procedures at this time, we are considering EUS-guided cystgastrostomy vs. ERCP/pancreatic duct stenting and will make this decision in a multidisciplinary fashion.
Pt continues to be stable. he has a WOPN vs pseudocyst that appears to have been superinfected by outpatient FNA at another hospital. This seems to be resolving. Can plan for repeat MRI as outpatient. Ultimately if he has evidence of recurrent leukocytosis or fever drainage should be curative.

## 2019-07-03 ENCOUNTER — INBOUND DOCUMENT (OUTPATIENT)
Age: 69
End: 2019-07-03

## 2019-07-09 ENCOUNTER — APPOINTMENT (OUTPATIENT)
Dept: SURGERY | Facility: CLINIC | Age: 69
End: 2019-07-09
Payer: COMMERCIAL

## 2019-07-09 VITALS
TEMPERATURE: 97.1 F | RESPIRATION RATE: 17 BRPM | HEIGHT: 66 IN | DIASTOLIC BLOOD PRESSURE: 77 MMHG | OXYGEN SATURATION: 97 % | SYSTOLIC BLOOD PRESSURE: 117 MMHG | BODY MASS INDEX: 22.18 KG/M2 | WEIGHT: 138 LBS | HEART RATE: 68 BPM

## 2019-07-09 DIAGNOSIS — R63.0 ANOREXIA: ICD-10-CM

## 2019-07-09 PROCEDURE — 99024 POSTOP FOLLOW-UP VISIT: CPT

## 2019-07-11 PROBLEM — R63.0 APPETITE ABSENT: Status: RESOLVED | Noted: 2018-10-16 | Resolved: 2019-07-11

## 2019-07-11 NOTE — PHYSICAL EXAM
[Normal] : oriented to person, place and time, with appropriate affect [de-identified] : Anicteric  [de-identified] : Normal lips  [de-identified] : Well developed, thin male, NAD  [de-identified] : Not distended, surgical wound site CDI / mild erythema  [de-identified] : No deformities appreciated  [de-identified] : Normal respiratory effort

## 2019-07-11 NOTE — REVIEW OF SYSTEMS
[Negative] : Constitutional [FreeTextEntry6] : no shortness of breath  [FreeTextEntry5] : no chest pain  [FreeTextEntry8] : LUQ discomfort post op

## 2019-07-11 NOTE — REASON FOR VISIT
[Post-Op] : a post-op for [FreeTextEntry2] : s/p pancreaticojejunostomy  [Family Member] : family member

## 2019-07-11 NOTE — PHYSICAL EXAM
[Normal] : oriented to person, place and time, with appropriate affect [de-identified] : anicteric  [de-identified] : Thin male, NAD  [de-identified] : normal respiratory effort  [de-identified] : Well approximated surgical incision-  staples & KAITY removed, tolerated well.

## 2019-07-11 NOTE — ASSESSMENT
[FreeTextEntry1] : Patient presents for POV #2. Doing great. Stopped taking medications.\par -Nutrition encouraged/ small frequent meals if better tolerated\par -RTC in 4 weeks

## 2019-07-11 NOTE — REVIEW OF SYSTEMS
[Negative] : Constitutional [FreeTextEntry6] : no shortness of breath  [FreeTextEntry8] : LUQ discomfort post op  [FreeTextEntry5] : no chest pain

## 2019-07-11 NOTE — HISTORY OF PRESENT ILLNESS
[de-identified] : Patient well known to me, s/p pancreaticojejunostomy on 6/14/19, presents for post op visit for staples and drain removal. He is with his son. Patient overall doing well post-op. Tolerating PO diet.  no fever and no chills.

## 2019-07-11 NOTE — PHYSICAL EXAM
[Normal] : oriented to person, place and time, with appropriate affect [de-identified] : anicteric  [de-identified] : Thin male, NAD  [de-identified] : normal respiratory effort  [de-identified] : Well approximated surgical incision-  staples & KAITY removed, tolerated well.

## 2019-07-11 NOTE — ASSESSMENT
[FreeTextEntry1] : s/p panc-j body/tail of pancreas on 6/14/19, here for POV#1, staples/ KAITY removed. Recovering well post-op. \par -RTC in 2 weeks

## 2019-07-11 NOTE — HISTORY OF PRESENT ILLNESS
[de-identified] : Patient well known to me, s/p pancreaticojejunostomy on 6/14/19, presents for post op visit for staples and drain removal. He is with his son. Patient overall doing well post-op. Tolerating PO diet.

## 2019-07-11 NOTE — REVIEW OF SYSTEMS
[Negative] : Genitourinary [FreeTextEntry5] : no chest pain  [de-identified] : wound CDI  [FreeTextEntry8] : as above  [FreeTextEntry6] : no shortness of breath

## 2019-07-11 NOTE — PHYSICAL EXAM
[Normal] : oriented to person, place and time, with appropriate affect [de-identified] : anicteric  [de-identified] : normal respiratory effort  [de-identified] : Thin male, NAD  [de-identified] : Well approximated surgical incision-  staples & KAITY removed, tolerated well.

## 2019-07-11 NOTE — HISTORY OF PRESENT ILLNESS
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 He presents for second post-op visit. Last visit came in for staples/ drain removal. Now here to see how he is doing at home. Patient is with his son. He reports doing great. He is overall eating ok and is trying to maintain his weight (lost 2 lbs since prior to surgery).  He has stopped taking all medications. Reports normal bowel movements. Has some mild discomfort as surgical site with stretching. Wound is clean and dry.

## 2019-07-11 NOTE — HISTORY OF PRESENT ILLNESS
[de-identified] : Patient well known to me, s/p pancreaticojejunostomy on 6/14/19, presents for post op visit for staples and drain removal. He is with his son. Patient overall doing well post-op. Tolerating PO diet.

## 2019-07-11 NOTE — REASON FOR VISIT
[Post-Op] : a post-op for [Family Member] : family member [FreeTextEntry2] : s/p pancreaticojejunostomy

## 2019-07-11 NOTE — REASON FOR VISIT
[Post-Op] : a post-op for [FreeTextEntry2] : s/p pancreaticojejunostomy  [Family Member] : family member no depression/no insomnia/no anxiety/no suicidal ideation

## 2019-07-11 NOTE — REVIEW OF SYSTEMS
[Negative] : Constitutional [FreeTextEntry5] : no chest pain  [FreeTextEntry6] : no shortness of breath  [FreeTextEntry8] : LUQ discomfort post op

## 2019-08-13 ENCOUNTER — APPOINTMENT (OUTPATIENT)
Dept: SURGERY | Facility: CLINIC | Age: 69
End: 2019-08-13
Payer: COMMERCIAL

## 2019-08-13 VITALS
HEART RATE: 66 BPM | WEIGHT: 138 LBS | BODY MASS INDEX: 22.18 KG/M2 | DIASTOLIC BLOOD PRESSURE: 72 MMHG | HEIGHT: 66 IN | TEMPERATURE: 98 F | SYSTOLIC BLOOD PRESSURE: 125 MMHG | RESPIRATION RATE: 16 BRPM

## 2019-08-13 DIAGNOSIS — Z98.890 OTHER SPECIFIED POSTPROCEDURAL STATES: ICD-10-CM

## 2019-08-13 PROCEDURE — 99024 POSTOP FOLLOW-UP VISIT: CPT

## 2019-08-15 RX ORDER — ERYTHROMYCIN 500 MG/1
500 TABLET, DELAYED RELEASE ORAL 3 TIMES DAILY
Qty: 90 | Refills: 2 | Status: COMPLETED | COMMUNITY
Start: 2018-10-16 | End: 2019-07-29

## 2019-08-15 RX ORDER — MEGESTROL ACETATE 40 MG/ML
40 SUSPENSION ORAL DAILY
Qty: 600 | Refills: 3 | Status: COMPLETED | COMMUNITY
Start: 2018-10-16 | End: 2019-07-29

## 2019-08-20 PROBLEM — Z98.890 HISTORY OF PANCREATIC SURGERY: Status: ACTIVE | Noted: 2019-08-20

## 2019-08-20 NOTE — ASSESSMENT
[FreeTextEntry1] : Patient is s/p panc-j, doing well.  No known hx of diabetes.  Will check HgA1c level and call patient with results. Call me in 2 months to see how he is doing and can RTC as needed.

## 2019-08-20 NOTE — REVIEW OF SYSTEMS
[Negative] : Musculoskeletal [FreeTextEntry8] : no nausea, vomiting, diarrhea or constipation  [de-identified] : no hx of diabetes

## 2019-08-20 NOTE — PHYSICAL EXAM
[Normal] : oriented to person, place and time, with appropriate affect [de-identified] : anicteric  [de-identified] : normal lips  [de-identified] : normal respiratory effort  [de-identified] : supple  [de-identified] : well approximated surgical incision with erythema local to site  [de-identified] : normal appearance

## 2019-08-20 NOTE — HISTORY OF PRESENT ILLNESS
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  He has gained 5 lbs since prior visit and reports eating normal meals. He is doing well, and with no residual pain. Reports he is getting stronger and is pleased with how he is doing.

## 2019-09-03 ENCOUNTER — APPOINTMENT (OUTPATIENT)
Dept: SURGERY | Facility: CLINIC | Age: 69
End: 2019-09-03

## 2020-07-20 NOTE — H&P PST ADULT - VENOUS THROMBOEMBOLISM FOR WOMEN ONLY
Quality 137: Melanoma: Continuity Of Care - Recall System: Patient information entered into a recall system that includes: target date for the next exam specified AND a process to follow up with patients regarding missed or unscheduled appointments
Detail Level: Detailed
(0) indicator not present

## 2020-10-05 NOTE — DISCHARGE NOTE ADULT - NS AS DC AMI YN
Months Of Therapy Completed: 5 no Female Pregnancy Counseling Text: Female patients should also be on two forms of birth control while taking this medication and for one month after their last dose. What Is The Patient's Gender: Male Nosebleeds Normal Treatment: I explained this is common when taking isotretinoin. I recommended saline mist in each nostril multiple times a day. If this worsens they will contact us. Target Cumulative Dosage (In Mg/Kg): 135 Most Recent Lfts (Optional): within normal limits Dosing Month 2 (Required For Cumulative Dosing): 40mg BID Weight Units: pounds Any Hypertriglyceridemia?: Yes - Monitoring Is Xerosis Present?: Yes - Normal Treatment Xerosis Normal Treatment: I recommended application of Cetaphil or CeraVe numerous times a day going to bed to all dry areas. Cheilitis Aggressive Treatment: I recommended application of Vaseline or Aquaphor numerous times a day (as often as every hour) and before going to bed. I also prescribed a topical steroid for twice daily use. Retinoid Dermatitis Aggressive Treatment: I recommended more frequent application of Cetaphil or CeraVe to the areas of dermatitis. I also prescribed a topical steroid for twice daily use until the dermatitis resolves. Dosing Month 5 (Required For Cumulative Dosing): 120mg Daily Hypercholesterolemia Monitoring: I explained this is common when taking isotretinoin. We will monitor closely. Myalgia Treatment: I explained this is common when taking isotretinoin. If this worsens they will contact us. They may try OTC ibuprofen. Xerosis Normal Treatment: I recommended application of Cetaphil or CeraVe numerous times a day and before going to bed to all dry areas. Any Nosebleeds?: No Myalgia Monitoring: I explained this is common when taking isotretinoin. If this worsens they will contact us. Kilograms Preamble Statement (Weight Entered In Details Tab): Reported Weight in kilograms: Lower Range (In Mg/Kg): 120 Most Recent Cbc (Optional): within acceptable limits Headache Monitoring: I recommended monitoring the headaches for now. There is no evidence of increased intracranial pressure. They were instructed to call if the headaches are worsening. Counseling Text: I reviewed the side effect in detail. Patient should get monthly blood tests, not donate blood, not drive at night if vision affected, and not share medication. Xerosis Aggressive Treatment: I recommended application of Cetaphil or CeraVe numerous times a day going to bed to all dry areas. I also prescribed a topical steroid for twice daily use. Retinoid Dermatitis Normal Treatment: I recommended more frequent application of Cetaphil or CeraVe to the areas of dermatitis. Male Completion Statement: After discussing his treatment course we decided to discontinue isotretinoin therapy at this time. He shouldn't donate blood for one month after the last dose. He should call with any new symptoms of depression. Cheilitis Normal Treatment: I recommended application of Vaseline or Aquaphor numerous times a day (as often as every hour) and before going to bed. Display Individual Monthly Dosage In The Note (If Yes Will Display All Dosages Which Are Not N/A): yes Next Month's Dosage: Continue Current Dosage Detail Level: Zone Ipledge Number (Optional): [id number] Upper Range (In Mg/Kg): 150 Pounds Preamble Statement (Weight Entered In Details Tab): Reported Weight in pounds: Xerosis Aggressive Treatment: I recommended application of Cetaphil or CeraVe numerous times a day and before going to bed to all dry areas. I also prescribed a topical steroid for twice daily use. Female Completion Statement: After discussing her treatment course we decided to discontinue isotretinoin therapy at this time. I explained that she would need to continue her birth control methods for at least one month after the last dosage. She should also get a pregnancy test one month after the last dose. She shouldn't donate blood for one month after the last dose. She should call with any new symptoms of depression. Use Therapeutic Ranged Or Therapeutic Target: please select Range or Target

## 2022-05-17 ENCOUNTER — OUTPATIENT (OUTPATIENT)
Dept: OUTPATIENT SERVICES | Facility: HOSPITAL | Age: 72
LOS: 1 days | End: 2022-05-17
Payer: COMMERCIAL

## 2022-05-17 ENCOUNTER — APPOINTMENT (OUTPATIENT)
Dept: MRI IMAGING | Facility: IMAGING CENTER | Age: 72
End: 2022-05-17
Payer: COMMERCIAL

## 2022-05-17 DIAGNOSIS — R93.3 ABNORMAL FINDINGS ON DIAGNOSTIC IMAGING OF OTHER PARTS OF DIGESTIVE TRACT: Chronic | ICD-10-CM

## 2022-05-17 DIAGNOSIS — Z98.890 OTHER SPECIFIED POSTPROCEDURAL STATES: Chronic | ICD-10-CM

## 2022-05-17 DIAGNOSIS — Z00.8 ENCOUNTER FOR OTHER GENERAL EXAMINATION: ICD-10-CM

## 2022-05-17 DIAGNOSIS — Z98.890 OTHER SPECIFIED POSTPROCEDURAL STATES: ICD-10-CM

## 2022-05-17 DIAGNOSIS — K86.89 OTHER SPECIFIED DISEASES OF PANCREAS: ICD-10-CM

## 2022-05-17 PROCEDURE — 74183 MRI ABD W/O CNTR FLWD CNTR: CPT | Mod: 26

## 2022-05-17 PROCEDURE — A9585: CPT

## 2022-05-17 PROCEDURE — 74183 MRI ABD W/O CNTR FLWD CNTR: CPT

## 2022-05-24 ENCOUNTER — APPOINTMENT (OUTPATIENT)
Dept: SURGERY | Facility: CLINIC | Age: 72
End: 2022-05-24
Payer: COMMERCIAL

## 2022-05-24 VITALS
WEIGHT: 156 LBS | TEMPERATURE: 97.5 F | OXYGEN SATURATION: 98 % | BODY MASS INDEX: 25.07 KG/M2 | HEART RATE: 67 BPM | RESPIRATION RATE: 17 BRPM | DIASTOLIC BLOOD PRESSURE: 82 MMHG | HEIGHT: 66 IN | SYSTOLIC BLOOD PRESSURE: 142 MMHG

## 2022-05-24 DIAGNOSIS — Z98.890 OTHER SPECIFIED POSTPROCEDURAL STATES: ICD-10-CM

## 2022-05-24 LAB
CANCER AG19-9 SERPL-ACNC: 18 U/ML
ESTIMATED AVERAGE GLUCOSE: 154 MG/DL
HBA1C MFR BLD HPLC: 7 %

## 2022-05-24 PROCEDURE — 99214 OFFICE O/P EST MOD 30 MIN: CPT

## 2022-05-31 NOTE — PHYSICAL EXAM
[Normal] : oriented to person, place and time, with appropriate affect [de-identified] : anicteric  [de-identified] : supple  [de-identified] : normal respiratory effort  [de-identified] : not distended  [de-identified] : no deformities appreciated  [de-identified] : normal appearance

## 2022-05-31 NOTE — ASSESSMENT
[FreeTextEntry1] : Mr. 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  Recent MRI/MRCP 5/11/22 noting dilated distal main pancreatic duct to the proximal body similar to 12/22/2018 with dilated side branches. Also chronic portal vein occlusion with cavernous transformation and hepatic steatosis.   I personally reviewed his imaging and radiology reports. On MRI  pancreatic cyst is gone, pancreatic duct is still dilated in the body/tail.   \par Patient reports doing well and stable weight. \par Findings discussed with patient and his son Lux in detail and all questions answered at the time of visit.\par \par Plan:\par MRI/MRCP in 1 year\par RTC after above\par Referral to hepatology for hepatic steatosis \par Patient already plans to see his PCP for elevated HgA1C ( 7 %). \par

## 2022-05-31 NOTE — HISTORY OF PRESENT ILLNESS
[de-identified] : Mr. DAVID HUITRON is a 71 year old male who presents for a follow-up visit. \par Patient is s/p necrotizing pancreatitis 2/2 gallstones and subsequent disconnected duct. He underwent pancreaticojejunostomy body/tail of pancreas on 6/14/19.  Patient reports doing well, with stable weight. He is eating without issues.  Denies any abdominal pain, steatorrhea or changes in bowel habits. \par \par He presents after MRI/MRCP 5/11/22, IMPRESSION: dilated distal main pancreatic duct to the proximal body similar to 12/22/2018 with dilated side branches. No pancreatic mass identified. Normal caliber biliary tree with irregular narrowing distal common duct similar to 12/22/2018. Chronic portal vein occlusion with cavernous transformation. Hepatic steatosis. \par \par Ca 19-9:  18 \par HgA1c:  7% \par \par

## 2022-09-20 ENCOUNTER — APPOINTMENT (OUTPATIENT)
Dept: GASTROENTEROLOGY | Facility: CLINIC | Age: 72
End: 2022-09-20

## 2022-09-20 VITALS
TEMPERATURE: 98 F | OXYGEN SATURATION: 96 % | HEIGHT: 66 IN | HEART RATE: 66 BPM | BODY MASS INDEX: 23.14 KG/M2 | SYSTOLIC BLOOD PRESSURE: 115 MMHG | WEIGHT: 144 LBS | DIASTOLIC BLOOD PRESSURE: 77 MMHG

## 2022-09-20 DIAGNOSIS — Z12.11 ENCOUNTER FOR SCREENING FOR MALIGNANT NEOPLASM OF COLON: ICD-10-CM

## 2022-09-20 PROCEDURE — 99204 OFFICE O/P NEW MOD 45 MIN: CPT

## 2022-09-20 RX ORDER — SODIUM SULFATE, POTASSIUM SULFATE AND MAGNESIUM SULFATE 1.6; 3.13; 17.5 G/177ML; G/177ML; G/177ML
17.5-3.13-1.6 SOLUTION ORAL
Qty: 1 | Refills: 0 | Status: ACTIVE | COMMUNITY
Start: 2022-09-20 | End: 1900-01-01

## 2022-09-20 NOTE — PHYSICAL EXAM

## 2022-09-23 NOTE — ASSESSMENT
[FreeTextEntry1] : This is a 71 year old male here for an evaluation of blood tinged stool.\par \par My plan\par -colonoscopy\par -suprep\par -covid swab\par \par Risks, benefits, and alternatives of colonoscopy discussed at length with patient including but not limited to bleeding perforation, anesthesia related complication, aspiration, etc. Patient expressed understanding.\par \par Colonoscopy for evaluation of polyps, tumors, nodules with +/- biopsy and or cauterization w/ and or w/o clipping. \par  \par \par

## 2022-09-23 NOTE — HISTORY OF PRESENT ILLNESS
[FreeTextEntry1] : This is a 71 year old male here for an evaluation of blood tinged stool. PMH of s/p necrotizing pancreatitis 2/2 gallstones and subsequent disconnected duct. He underwent pancreaticojejunostomy body/tail of pancreas on 6/14/19. Father with liver cancer. Denies a family history of colon CA, gastric CA, high risk polyps, Inflammatory and autoimmune disease. Patient denies any difficulty swallowing or reflux. No N&V or abdominal pain. Had one episode of blood tinged stool on 8/15/22. Stool normal caliber. Weight fluctuating. Never had a colonoscopy \par Smoke: former, 30 pack years\par Etoh: social

## 2022-10-10 ENCOUNTER — LABORATORY RESULT (OUTPATIENT)
Age: 72
End: 2022-10-10

## 2022-10-12 ENCOUNTER — OUTPATIENT (OUTPATIENT)
Dept: OUTPATIENT SERVICES | Facility: HOSPITAL | Age: 72
LOS: 1 days | End: 2022-10-12
Payer: COMMERCIAL

## 2022-10-12 ENCOUNTER — RESULT REVIEW (OUTPATIENT)
Age: 72
End: 2022-10-12

## 2022-10-12 ENCOUNTER — TRANSCRIPTION ENCOUNTER (OUTPATIENT)
Age: 72
End: 2022-10-12

## 2022-10-12 ENCOUNTER — APPOINTMENT (OUTPATIENT)
Dept: GASTROENTEROLOGY | Facility: HOSPITAL | Age: 72
End: 2022-10-12

## 2022-10-12 VITALS
HEART RATE: 60 BPM | SYSTOLIC BLOOD PRESSURE: 111 MMHG | DIASTOLIC BLOOD PRESSURE: 68 MMHG | OXYGEN SATURATION: 98 % | RESPIRATION RATE: 15 BRPM

## 2022-10-12 VITALS
TEMPERATURE: 98 F | RESPIRATION RATE: 20 BRPM | OXYGEN SATURATION: 98 % | WEIGHT: 143.96 LBS | SYSTOLIC BLOOD PRESSURE: 107 MMHG | DIASTOLIC BLOOD PRESSURE: 79 MMHG | HEIGHT: 66 IN | HEART RATE: 72 BPM

## 2022-10-12 DIAGNOSIS — Z98.890 OTHER SPECIFIED POSTPROCEDURAL STATES: Chronic | ICD-10-CM

## 2022-10-12 DIAGNOSIS — R93.3 ABNORMAL FINDINGS ON DIAGNOSTIC IMAGING OF OTHER PARTS OF DIGESTIVE TRACT: Chronic | ICD-10-CM

## 2022-10-12 DIAGNOSIS — Z12.11 ENCOUNTER FOR SCREENING FOR MALIGNANT NEOPLASM OF COLON: ICD-10-CM

## 2022-10-12 PROCEDURE — 45380 COLONOSCOPY AND BIOPSY: CPT

## 2022-10-12 PROCEDURE — 88305 TISSUE EXAM BY PATHOLOGIST: CPT | Mod: 26

## 2022-10-12 PROCEDURE — 88305 TISSUE EXAM BY PATHOLOGIST: CPT

## 2022-10-12 NOTE — ASU PATIENT PROFILE, ADULT - NSICDXPASTMEDICALHX_GEN_ALL_CORE_FT
PAST MEDICAL HISTORY:  Chronic pancreatitis, unspecified pancreatitis type     Pancreatic duct dilated

## 2022-10-12 NOTE — ASU DISCHARGE PLAN (ADULT/PEDIATRIC) - NS MD DC FALL RISK RISK
For information on Fall & Injury Prevention, visit: [email or web address] OR  [email or web address] OR  https://www.cdc.gov/steadi/patient.html

## 2022-10-12 NOTE — ASU PATIENT PROFILE, ADULT - NSICDXPASTSURGICALHX_GEN_ALL_CORE_FT
PAST SURGICAL HISTORY:  Abnormal magnetic resonance cholangiopancreatography (MRCP) 1/2019    H/O endoscopy 2018    History of ERCP 1/2019,

## 2022-10-14 LAB — SURGICAL PATHOLOGY STUDY: SIGNIFICANT CHANGE UP

## 2022-11-02 NOTE — H&P PST ADULT - NSANTHOSAYNRD_GEN_A_CORE
11/02/22 1145   Discharge Planning   Assessment Type Discharge Planning Brief Assessment   Resource/Environmental Concerns none   Support Systems Parent;Family members   Equipment Currently Used at Home none   Current Living Arrangements home/apartment/condo   Patient/Family Anticipates Transition to home   Patient/Family Anticipated Services at Transition none   DME Needed Upon Discharge  none   Discharge Plan A Home  (with instructions to follow up)     [facility] - [place] - [address]  [address]  [place] [address]  Phone: [phone] Fax: [phone]    [place], [place] - [address]  [address] [address]  Phone: [phone] Fax: [phone]    
   11/02/22 1623   Final Note   Assessment Type Final Discharge Note   Anticipated Discharge Disposition   (OP PT/OT)   Hospital Resources/Appts/Education Provided Appointments scheduled and added to AVS;Post-Acute resouces added to AVS   Post-Acute Status   Post-Acute Authorization   (OP PT/OT)   Pts nurse Hercules notified that the pt can d/c from CM standpoint    
  Problem: Fluid and Electrolyte Imbalance (Acute Kidney Injury/Impairment)  Goal: Fluid and Electrolyte Balance  Outcome: Ongoing, Progressing     Problem: Oral Intake Inadequate (Acute Kidney Injury/Impairment)  Goal: Optimal Nutrition Intake  Outcome: Ongoing, Progressing     Problem: Renal Function Impairment (Acute Kidney Injury/Impairment)  Goal: Effective Renal Function  Outcome: Ongoing, Progressing     Problem: Coping Ineffective  Goal: Effective Coping  Outcome: Ongoing, Progressing     Problem: Mobility Impairment  Goal: Optimal Mobility  Outcome: Ongoing, Progressing     Problem: Pain Chronic (Persistent)  Goal: Acceptable Pain Control and Functional Ability  Outcome: Ongoing, Progressing     
  Problem: Physical Therapy  Goal: Physical Therapy Goal  Outcome: Adequate for Care Transition   Initial PT evaluation performed today.  Pt OK for D/C home with family support. HHPT/OT, BSC, and RW recommended.  OK for OOB to chair and BSC with nursing staff.   PT to sign off.  
No. TRU screening performed.  STOP BANG Legend: 0-2 = LOW Risk; 3-4 = INTERMEDIATE Risk; 5-8 = HIGH Risk

## 2023-05-20 ENCOUNTER — APPOINTMENT (OUTPATIENT)
Dept: MRI IMAGING | Facility: IMAGING CENTER | Age: 73
End: 2023-05-20

## 2023-06-19 NOTE — PHYSICAL EXAM
[Normal] : oriented to person, place and time, with appropriate affect [de-identified] : anicteric  [de-identified] : supple  [de-identified] : normal respiratory effort  [de-identified] : not distended  [de-identified] : no deformities appreciated  [de-identified] : normal appearance

## 2023-06-19 NOTE — HISTORY OF PRESENT ILLNESS
[de-identified] : Mr. DAVID HUITRON is a 71 year old male who presents for a follow-up visit.  Patient is s/p necrotizing pancreatitis 2/2 gallstones and subsequent disconnected duct. He underwent pancreaticojejunostomy body/tail of pancreas on 6/14/19.  Patient reports doing well, with stable weight. He is eating without issues.  Denies any abdominal pain, steatorrhea or changes in bowel habits.   He presents after MRI/MRCP 5/11/22, I dilated distal main pancreatic duct to the proximal body similar to 12/22/2018 with dilated side branches. No pancreatic mass identified. Normal caliber biliary tree with irregular narrowing distal common duct similar to 12/22/2018. Chronic portal vein occlusion with cavernous transformation. Hepatic steatosis.   Ca 19-9:  18  HgA1c:  7%

## 2023-06-20 ENCOUNTER — APPOINTMENT (OUTPATIENT)
Dept: SURGERY | Facility: CLINIC | Age: 73
End: 2023-06-20

## 2023-07-16 ENCOUNTER — OUTPATIENT (OUTPATIENT)
Dept: OUTPATIENT SERVICES | Facility: HOSPITAL | Age: 73
LOS: 1 days | End: 2023-07-16
Payer: COMMERCIAL

## 2023-07-16 ENCOUNTER — APPOINTMENT (OUTPATIENT)
Dept: MRI IMAGING | Facility: IMAGING CENTER | Age: 73
End: 2023-07-16
Payer: COMMERCIAL

## 2023-07-16 DIAGNOSIS — Z98.890 OTHER SPECIFIED POSTPROCEDURAL STATES: Chronic | ICD-10-CM

## 2023-07-16 DIAGNOSIS — Z00.8 ENCOUNTER FOR OTHER GENERAL EXAMINATION: ICD-10-CM

## 2023-07-16 DIAGNOSIS — K86.89 OTHER SPECIFIED DISEASES OF PANCREAS: ICD-10-CM

## 2023-07-16 DIAGNOSIS — R93.3 ABNORMAL FINDINGS ON DIAGNOSTIC IMAGING OF OTHER PARTS OF DIGESTIVE TRACT: Chronic | ICD-10-CM

## 2023-07-16 PROCEDURE — A9585: CPT

## 2023-07-16 PROCEDURE — 74183 MRI ABD W/O CNTR FLWD CNTR: CPT | Mod: 26

## 2023-07-16 PROCEDURE — 74183 MRI ABD W/O CNTR FLWD CNTR: CPT

## 2023-07-25 ENCOUNTER — APPOINTMENT (OUTPATIENT)
Dept: SURGERY | Facility: CLINIC | Age: 73
End: 2023-07-25
Payer: COMMERCIAL

## 2023-07-25 VITALS
HEIGHT: 66 IN | BODY MASS INDEX: 23.3 KG/M2 | RESPIRATION RATE: 16 BRPM | HEART RATE: 70 BPM | DIASTOLIC BLOOD PRESSURE: 75 MMHG | WEIGHT: 145 LBS | OXYGEN SATURATION: 99 % | SYSTOLIC BLOOD PRESSURE: 144 MMHG

## 2023-07-25 DIAGNOSIS — Z98.890 OTHER SPECIFIED POSTPROCEDURAL STATES: ICD-10-CM

## 2023-07-25 DIAGNOSIS — K86.89 OTHER SPECIFIED DISEASES OF PANCREAS: ICD-10-CM

## 2023-07-25 DIAGNOSIS — K85.91 ACUTE PANCREATITIS WITH UNINFECTED NECROSIS, UNSPECIFIED: ICD-10-CM

## 2023-07-25 PROCEDURE — 99213 OFFICE O/P EST LOW 20 MIN: CPT

## 2023-07-28 PROBLEM — K85.91 ACUTE NECROTIZING PANCREATITIS: Status: ACTIVE | Noted: 2018-09-12

## 2023-07-28 PROBLEM — Z98.890 HISTORY OF PANCREATIC SURGERY: Status: ACTIVE | Noted: 2019-07-11

## 2023-07-28 PROBLEM — K86.89 PANCREATIC DUCT DISRUPTION: Status: ACTIVE | Noted: 2023-07-28

## 2023-07-28 PROBLEM — K86.89 PANCREATIC DUCT DILATED: Status: ACTIVE | Noted: 2019-01-09

## 2023-07-28 NOTE — ASSESSMENT
[FreeTextEntry1] : Mr. 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  Recent MRI/MRCP 5/11/22 noting dilated distal main pancreatic duct to the proximal body similar to 12/22/2018 with dilated side branches. Also chronic portal vein occlusion with cavernous transformation and hepatic steatosis.   I personally reviewed his imaging and radiology reports. On MRI  pancreatic cyst is gone, pancreatic duct is still dilated in the body/tail.   \par Patient reports doing well and stable weight. \par Findings discussed with patient and his son Lux in detail and all questions answered at the time of visit.\par The patient now returns for the first visit since May 2022.  He has done extremely well after surgery.  His nutrition is perfect he looks outstanding and he has no complaints at all.  His wound is completely healed.  I met with him and his son who translates for him.  I advised him that his recent imaging of MRCP shows everything to be completely normal.  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  But otherwise all other aspects of the patient's imaging and symptomatology are completely resolved.  For that reason I advised the patient that I see no need to repeat reason to doing imaging in 1 year anymore.  I advised him that he could return to clinic as needed.\par \par Plan:\par \par RTC prn\par \par Patient already plans to see his PCP for elevated HgA1C ( 7 %). \par

## 2023-07-28 NOTE — PHYSICAL EXAM
[Normal] : oriented to person, place and time, with appropriate affect [de-identified] : anicteric  [de-identified] : supple  [de-identified] : normal respiratory effort  [de-identified] : not distended  [de-identified] : no deformities appreciated  [de-identified] : normal appearance

## 2023-07-28 NOTE — HISTORY OF PRESENT ILLNESS
[de-identified] : Mr. DAVID HUITRON is a 71 year old male who presents for a follow-up visit. \par Patient is s/p necrotizing pancreatitis 2/2 gallstones and subsequent disconnected duct. He underwent pancreaticojejunostomy body/tail of pancreas on 6/14/19.  Patient reports doing well, with stable weight. He is eating without issues.  Denies any abdominal pain, steatorrhea or changes in bowel habits. \par \par He presents after MRI/MRCP 5/11/22, IMPRESSION: dilated distal main pancreatic duct to the proximal body similar to 12/22/2018 with dilated side branches. No pancreatic mass identified. Normal caliber biliary tree with irregular narrowing distal common duct similar to 12/22/2018. Chronic portal vein occlusion with cavernous transformation. Hepatic steatosis. \par \par Ca 19-9:  18 \par HgA1c:  7% \par \par

## 2023-09-12 NOTE — ED ADULT NURSE NOTE - ED STAT RN HANDOFF TIME
19:32 23:05 Bactrim Pregnancy And Lactation Text: This medication is Pregnancy Category D and is known to cause fetal risk.  It is also excreted in breast milk.

## 2024-06-12 NOTE — ED ADULT NURSE NOTE - NSFALLRSKHARMRISK_ED_ALL_ED
Fax received from [facility] for provider to complete and fax back to [phone].   yes

## 2025-05-06 NOTE — DISCHARGE NOTE ADULT - PATIENT PORTAL LINK FT
Medicare Wellness Visit Findings:    Pt was found to have need for:   Health Maintenance Due   Topic Date Due    Respiratory Syncytial Virus (RSV) Vaccine 60+ (1 - 1-dose 75+ series) Never done    Diabetes Eye Exam  02/13/2025    COVID-19 Vaccine (6 - 2024-25 season) 04/22/2025    Diabetes Foot Exam  04/25/2025          Fall risk score: 0  Depression score: 0    Pt states was seen by Dr. Deborah Bermeo for eye exam 2/2025. Informed patient to request consult notes to be faxed to clinic. Diabetic foot exam noted to be done by MD Lorenzo 4/25/24. Patient states checks BG at home.   Patient states has been having left leg/thigh and knee pain. Patient states pain is on and off and relates pain 5 on a (0-10) scale. Patient has PPO insurance, provided with [facility] service provider contact information.    F/U appt scheduled in office with Bj HIGHTOWER on 07/10/2025    HCC diagnosis report: None        
Noted.  If patient is having knee pain that is new for him appointment in the next 2 to 3 weeks to evaluate this further, if it is mild we can wait until his next scheduled appointment  
Please see other encounter   
You can access the [facility], offered by [facility], by registering with the following website: [email or web address]

## (undated) DEVICE — SYR LUER LOK 50CC

## (undated) DEVICE — SOL INJ NS 0.9% 500ML 1-PORT

## (undated) DEVICE — RETRIEVER ROTH NET PLATINUM-UNIVERSAL

## (undated) DEVICE — TUBING CANNULA SALTER LABS NASAL ADULT 7FT

## (undated) DEVICE — FORCEP BIOPSY 2.5MM DISP

## (undated) DEVICE — DRSG CURITY GAUZE SPONGE 4 X 4" 12-PLY

## (undated) DEVICE — SENSOR O2 FINGER ADULT

## (undated) DEVICE — TUBING IV SET GRAVITY 3Y 100" MACRO

## (undated) DEVICE — NDL INJ SCLERO INTERJECT 23G

## (undated) DEVICE — SNARE LOOP POLY DISP 30MM LOOP

## (undated) DEVICE — ADAPTER ENDO CHNL SINGLE USE

## (undated) DEVICE — LUBRICATING JELLY ONESHOT 1.25OZ

## (undated) DEVICE — CATH ELCTR GLIDE PRB 7FR

## (undated) DEVICE — TUBING MEDI-VAC W MAXIGRIP CONNECTORS 1/4"X6'

## (undated) DEVICE — CLAMP BX HOT RAD JAW 3

## (undated) DEVICE — STERIS DEFENDO 3-PIECE KIT (AIR/WATER, SUCTION & BIOPSY VALVES)

## (undated) DEVICE — KIT ENDO PROCEDURE CUST W/VLV